# Patient Record
Sex: MALE | Race: BLACK OR AFRICAN AMERICAN | Employment: FULL TIME | URBAN - METROPOLITAN AREA
[De-identification: names, ages, dates, MRNs, and addresses within clinical notes are randomized per-mention and may not be internally consistent; named-entity substitution may affect disease eponyms.]

---

## 2022-12-01 ENCOUNTER — APPOINTMENT (OUTPATIENT)
Dept: GENERAL RADIOLOGY | Age: 62
DRG: 390 | End: 2022-12-01
Payer: COMMERCIAL

## 2022-12-01 ENCOUNTER — HOSPITAL ENCOUNTER (INPATIENT)
Age: 62
LOS: 5 days | Discharge: HOME | DRG: 390 | End: 2022-12-06
Attending: INTERNAL MEDICINE | Admitting: INTERNAL MEDICINE
Payer: COMMERCIAL

## 2022-12-01 ENCOUNTER — APPOINTMENT (OUTPATIENT)
Dept: CT IMAGING | Age: 62
DRG: 390 | End: 2022-12-01
Payer: COMMERCIAL

## 2022-12-01 DIAGNOSIS — K56.609 SBO (SMALL BOWEL OBSTRUCTION) (HCC): Primary | ICD-10-CM

## 2022-12-01 LAB
A/G RATIO: 1.3 (ref 1.1–2.2)
ALBUMIN SERPL-MCNC: 4.6 G/DL (ref 3.4–5)
ALP BLD-CCNC: 92 U/L (ref 40–129)
ALT SERPL-CCNC: 20 U/L (ref 10–40)
ANION GAP SERPL CALCULATED.3IONS-SCNC: 11 MMOL/L (ref 3–16)
AST SERPL-CCNC: 21 U/L (ref 15–37)
BACTERIA: ABNORMAL /HPF
BASOPHILS ABSOLUTE: 0 K/UL (ref 0–0.2)
BASOPHILS RELATIVE PERCENT: 0.3 %
BILIRUB SERPL-MCNC: 0.4 MG/DL (ref 0–1)
BILIRUBIN URINE: NEGATIVE
BLOOD, URINE: NEGATIVE
BUN BLDV-MCNC: 19 MG/DL (ref 7–20)
CALCIUM SERPL-MCNC: 10.1 MG/DL (ref 8.3–10.6)
CHLORIDE BLD-SCNC: 99 MMOL/L (ref 99–110)
CLARITY: CLEAR
CO2: 28 MMOL/L (ref 21–32)
COLOR: YELLOW
COMMENT UA: ABNORMAL
CREAT SERPL-MCNC: 0.9 MG/DL (ref 0.8–1.3)
EOSINOPHILS ABSOLUTE: 0.1 K/UL (ref 0–0.6)
EOSINOPHILS RELATIVE PERCENT: 0.5 %
EPITHELIAL CELLS, UA: ABNORMAL /HPF (ref 0–5)
GFR SERPL CREATININE-BSD FRML MDRD: >60 ML/MIN/{1.73_M2}
GLUCOSE BLD-MCNC: 178 MG/DL (ref 70–99)
GLUCOSE URINE: >=1000 MG/DL
HCT VFR BLD CALC: 43.5 % (ref 40.5–52.5)
HEMOGLOBIN: 13.5 G/DL (ref 13.5–17.5)
KETONES, URINE: 15 MG/DL
LACTIC ACID, SEPSIS: 1.2 MMOL/L (ref 0.4–1.9)
LEUKOCYTE ESTERASE, URINE: NEGATIVE
LIPASE: 20 U/L (ref 13–60)
LYMPHOCYTES ABSOLUTE: 1.9 K/UL (ref 1–5.1)
LYMPHOCYTES RELATIVE PERCENT: 16.5 %
MCH RBC QN AUTO: 23.9 PG (ref 26–34)
MCHC RBC AUTO-ENTMCNC: 31.1 G/DL (ref 31–36)
MCV RBC AUTO: 77 FL (ref 80–100)
MICROSCOPIC EXAMINATION: YES
MONOCYTES ABSOLUTE: 0.6 K/UL (ref 0–1.3)
MONOCYTES RELATIVE PERCENT: 4.9 %
MUCUS: ABNORMAL /LPF
NEUTROPHILS ABSOLUTE: 8.8 K/UL (ref 1.7–7.7)
NEUTROPHILS RELATIVE PERCENT: 77.8 %
NITRITE, URINE: NEGATIVE
PDW BLD-RTO: 16.7 % (ref 12.4–15.4)
PH UA: 5 (ref 5–8)
PLATELET # BLD: 211 K/UL (ref 135–450)
PMV BLD AUTO: 7.8 FL (ref 5–10.5)
POTASSIUM SERPL-SCNC: 4.1 MMOL/L (ref 3.5–5.1)
PROTEIN UA: ABNORMAL MG/DL
RBC # BLD: 5.65 M/UL (ref 4.2–5.9)
RBC UA: ABNORMAL /HPF (ref 0–4)
SODIUM BLD-SCNC: 138 MMOL/L (ref 136–145)
SPECIFIC GRAVITY UA: >=1.03 (ref 1–1.03)
TOTAL PROTEIN: 8.1 G/DL (ref 6.4–8.2)
URINE REFLEX TO CULTURE: ABNORMAL
URINE TYPE: ABNORMAL
UROBILINOGEN, URINE: 0.2 E.U./DL
WBC # BLD: 11.3 K/UL (ref 4–11)
WBC UA: ABNORMAL /HPF (ref 0–5)

## 2022-12-01 PROCEDURE — 83605 ASSAY OF LACTIC ACID: CPT

## 2022-12-01 PROCEDURE — 6360000002 HC RX W HCPCS: Performed by: NURSE PRACTITIONER

## 2022-12-01 PROCEDURE — 85025 COMPLETE CBC W/AUTO DIFF WBC: CPT

## 2022-12-01 PROCEDURE — 71045 X-RAY EXAM CHEST 1 VIEW: CPT

## 2022-12-01 PROCEDURE — 96374 THER/PROPH/DIAG INJ IV PUSH: CPT

## 2022-12-01 PROCEDURE — 96375 TX/PRO/DX INJ NEW DRUG ADDON: CPT

## 2022-12-01 PROCEDURE — 74176 CT ABD & PELVIS W/O CONTRAST: CPT

## 2022-12-01 PROCEDURE — 83690 ASSAY OF LIPASE: CPT

## 2022-12-01 PROCEDURE — 99285 EMERGENCY DEPT VISIT HI MDM: CPT

## 2022-12-01 PROCEDURE — 81001 URINALYSIS AUTO W/SCOPE: CPT

## 2022-12-01 PROCEDURE — 2580000003 HC RX 258: Performed by: NURSE PRACTITIONER

## 2022-12-01 PROCEDURE — 80053 COMPREHEN METABOLIC PANEL: CPT

## 2022-12-01 PROCEDURE — 1200000000 HC SEMI PRIVATE

## 2022-12-01 RX ORDER — ONDANSETRON 2 MG/ML
4 INJECTION INTRAMUSCULAR; INTRAVENOUS EVERY 30 MIN PRN
Status: DISCONTINUED | OUTPATIENT
Start: 2022-12-01 | End: 2022-12-02

## 2022-12-01 RX ORDER — GLIPIZIDE 10 MG/1
10 TABLET ORAL DAILY
Status: ON HOLD | COMMUNITY
Start: 2019-04-30 | End: 2022-12-06 | Stop reason: HOSPADM

## 2022-12-01 RX ORDER — SODIUM CHLORIDE 9 MG/ML
1000 INJECTION, SOLUTION INTRAVENOUS ONCE
Status: COMPLETED | OUTPATIENT
Start: 2022-12-01 | End: 2022-12-01

## 2022-12-01 RX ORDER — ONDANSETRON 4 MG/1
4 TABLET, ORALLY DISINTEGRATING ORAL EVERY 8 HOURS PRN
Status: ON HOLD | COMMUNITY
Start: 2020-11-20 | End: 2022-12-06 | Stop reason: HOSPADM

## 2022-12-01 RX ORDER — FAMOTIDINE 20 MG/1
20 TABLET, FILM COATED ORAL 2 TIMES DAILY
Status: ON HOLD | COMMUNITY
Start: 2020-01-14

## 2022-12-01 RX ORDER — EMPAGLIFLOZIN 10 MG/1
TABLET, FILM COATED ORAL
Status: ON HOLD | COMMUNITY
Start: 2022-11-22

## 2022-12-01 RX ORDER — ACETAMINOPHEN 325 MG/1
650 TABLET ORAL 3 TIMES DAILY
Status: ON HOLD | COMMUNITY
Start: 2020-01-14

## 2022-12-01 RX ORDER — ATORVASTATIN CALCIUM 40 MG/1
40 TABLET, FILM COATED ORAL DAILY
Status: ON HOLD | COMMUNITY
Start: 2019-04-30

## 2022-12-01 RX ORDER — BLOOD-GLUCOSE METER
KIT MISCELLANEOUS
Status: ON HOLD | COMMUNITY
Start: 2019-04-30

## 2022-12-01 RX ORDER — DULAGLUTIDE 1.5 MG/.5ML
INJECTION, SOLUTION SUBCUTANEOUS
Status: ON HOLD | COMMUNITY
Start: 2022-09-19

## 2022-12-01 RX ORDER — ASPIRIN 81 MG/1
81 TABLET ORAL DAILY
Status: ON HOLD | COMMUNITY
Start: 2020-01-14

## 2022-12-01 RX ORDER — MORPHINE SULFATE 4 MG/ML
4 INJECTION, SOLUTION INTRAMUSCULAR; INTRAVENOUS ONCE
Status: COMPLETED | OUTPATIENT
Start: 2022-12-01 | End: 2022-12-01

## 2022-12-01 RX ORDER — SILDENAFIL 25 MG/1
25 TABLET, FILM COATED ORAL PRN
Status: ON HOLD | COMMUNITY
Start: 2019-04-30

## 2022-12-01 RX ORDER — DULAGLUTIDE 0.75 MG/.5ML
INJECTION, SOLUTION SUBCUTANEOUS
Status: ON HOLD | COMMUNITY
Start: 2022-09-19

## 2022-12-01 RX ORDER — MELOXICAM 7.5 MG/1
7.5 TABLET ORAL DAILY
Status: ON HOLD | COMMUNITY

## 2022-12-01 RX ORDER — METOCLOPRAMIDE HYDROCHLORIDE 5 MG/ML
10 INJECTION INTRAMUSCULAR; INTRAVENOUS ONCE
Status: COMPLETED | OUTPATIENT
Start: 2022-12-01 | End: 2022-12-01

## 2022-12-01 RX ORDER — KETOROLAC TROMETHAMINE 30 MG/ML
15 INJECTION, SOLUTION INTRAMUSCULAR; INTRAVENOUS ONCE
Status: COMPLETED | OUTPATIENT
Start: 2022-12-01 | End: 2022-12-01

## 2022-12-01 RX ORDER — ATORVASTATIN CALCIUM 40 MG/1
TABLET, FILM COATED ORAL
Status: ON HOLD | COMMUNITY
Start: 2022-11-22 | End: 2022-12-06 | Stop reason: HOSPADM

## 2022-12-01 RX ORDER — DIPHENHYDRAMINE HYDROCHLORIDE 50 MG/ML
25 INJECTION INTRAMUSCULAR; INTRAVENOUS ONCE
Status: COMPLETED | OUTPATIENT
Start: 2022-12-01 | End: 2022-12-01

## 2022-12-01 RX ADMIN — ONDANSETRON 4 MG: 2 INJECTION INTRAMUSCULAR; INTRAVENOUS at 23:08

## 2022-12-01 RX ADMIN — DIPHENHYDRAMINE HYDROCHLORIDE 25 MG: 50 INJECTION, SOLUTION INTRAMUSCULAR; INTRAVENOUS at 23:22

## 2022-12-01 RX ADMIN — METOCLOPRAMIDE 10 MG: 5 INJECTION, SOLUTION INTRAMUSCULAR; INTRAVENOUS at 23:22

## 2022-12-01 RX ADMIN — KETOROLAC TROMETHAMINE 15 MG: 30 INJECTION, SOLUTION INTRAMUSCULAR; INTRAVENOUS at 23:07

## 2022-12-01 RX ADMIN — MORPHINE SULFATE 4 MG: 4 INJECTION, SOLUTION INTRAMUSCULAR; INTRAVENOUS at 23:08

## 2022-12-01 RX ADMIN — SODIUM CHLORIDE 1000 ML: 9 INJECTION, SOLUTION INTRAVENOUS at 23:07

## 2022-12-01 ASSESSMENT — ENCOUNTER SYMPTOMS
VOMITING: 1
NAUSEA: 1
ABDOMINAL PAIN: 1
CHEST TIGHTNESS: 0
SHORTNESS OF BREATH: 0
DIARRHEA: 1

## 2022-12-01 ASSESSMENT — PAIN SCALES - GENERAL: PAINLEVEL_OUTOF10: 10

## 2022-12-02 LAB
A/G RATIO: 1.3 (ref 1.1–2.2)
ALBUMIN SERPL-MCNC: 3.9 G/DL (ref 3.4–5)
ALP BLD-CCNC: 88 U/L (ref 40–129)
ALT SERPL-CCNC: 14 U/L (ref 10–40)
ANION GAP SERPL CALCULATED.3IONS-SCNC: 13 MMOL/L (ref 3–16)
APTT: 36.2 SEC (ref 23–34.3)
AST SERPL-CCNC: 18 U/L (ref 15–37)
BASOPHILS ABSOLUTE: 0 K/UL (ref 0–0.2)
BASOPHILS RELATIVE PERCENT: 0.3 %
BILIRUB SERPL-MCNC: 0.4 MG/DL (ref 0–1)
BUN BLDV-MCNC: 24 MG/DL (ref 7–20)
CALCIUM SERPL-MCNC: 9.4 MG/DL (ref 8.3–10.6)
CHLORIDE BLD-SCNC: 104 MMOL/L (ref 99–110)
CO2: 24 MMOL/L (ref 21–32)
CREAT SERPL-MCNC: 0.9 MG/DL (ref 0.8–1.3)
EOSINOPHILS ABSOLUTE: 0 K/UL (ref 0–0.6)
EOSINOPHILS RELATIVE PERCENT: 0.2 %
GFR SERPL CREATININE-BSD FRML MDRD: >60 ML/MIN/{1.73_M2}
GLUCOSE BLD-MCNC: 112 MG/DL (ref 70–99)
GLUCOSE BLD-MCNC: 115 MG/DL (ref 70–99)
GLUCOSE BLD-MCNC: 136 MG/DL (ref 70–99)
GLUCOSE BLD-MCNC: 146 MG/DL (ref 70–99)
GLUCOSE BLD-MCNC: 88 MG/DL (ref 70–99)
GLUCOSE BLD-MCNC: 91 MG/DL (ref 70–99)
GLUCOSE BLD-MCNC: 94 MG/DL (ref 70–99)
HCT VFR BLD CALC: 40.2 % (ref 40.5–52.5)
HEMOGLOBIN: 12.6 G/DL (ref 13.5–17.5)
INR BLD: 1.06 (ref 0.87–1.14)
LACTIC ACID: 0.8 MMOL/L (ref 0.4–2)
LYMPHOCYTES ABSOLUTE: 1.8 K/UL (ref 1–5.1)
LYMPHOCYTES RELATIVE PERCENT: 25.1 %
MAGNESIUM: 1.6 MG/DL (ref 1.8–2.4)
MCH RBC QN AUTO: 24.1 PG (ref 26–34)
MCHC RBC AUTO-ENTMCNC: 31.5 G/DL (ref 31–36)
MCV RBC AUTO: 76.6 FL (ref 80–100)
MONOCYTES ABSOLUTE: 0.7 K/UL (ref 0–1.3)
MONOCYTES RELATIVE PERCENT: 9.7 %
NEUTROPHILS ABSOLUTE: 4.6 K/UL (ref 1.7–7.7)
NEUTROPHILS RELATIVE PERCENT: 64.7 %
PDW BLD-RTO: 16.2 % (ref 12.4–15.4)
PERFORMED ON: ABNORMAL
PERFORMED ON: NORMAL
PHOSPHORUS: 4.8 MG/DL (ref 2.5–4.9)
PLATELET # BLD: 201 K/UL (ref 135–450)
PMV BLD AUTO: 7.8 FL (ref 5–10.5)
POTASSIUM SERPL-SCNC: 3.8 MMOL/L (ref 3.5–5.1)
PREALBUMIN: 23.1 MG/DL (ref 20–40)
PROTHROMBIN TIME: 13.7 SEC (ref 11.7–14.5)
RBC # BLD: 5.24 M/UL (ref 4.2–5.9)
SODIUM BLD-SCNC: 141 MMOL/L (ref 136–145)
TOTAL PROTEIN: 6.9 G/DL (ref 6.4–8.2)
TRANSFERRIN: 210 MG/DL (ref 200–360)
WBC # BLD: 7.1 K/UL (ref 4–11)

## 2022-12-02 PROCEDURE — 83605 ASSAY OF LACTIC ACID: CPT

## 2022-12-02 PROCEDURE — 84466 ASSAY OF TRANSFERRIN: CPT

## 2022-12-02 PROCEDURE — APPSS60 APP SPLIT SHARED TIME 46-60 MINUTES: Performed by: NURSE PRACTITIONER

## 2022-12-02 PROCEDURE — 1200000000 HC SEMI PRIVATE

## 2022-12-02 PROCEDURE — 85610 PROTHROMBIN TIME: CPT

## 2022-12-02 PROCEDURE — 6360000002 HC RX W HCPCS: Performed by: PHYSICIAN ASSISTANT

## 2022-12-02 PROCEDURE — 83735 ASSAY OF MAGNESIUM: CPT

## 2022-12-02 PROCEDURE — APPNB30 APP NON BILLABLE TIME 0-30 MINS: Performed by: NURSE PRACTITIONER

## 2022-12-02 PROCEDURE — 80053 COMPREHEN METABOLIC PANEL: CPT

## 2022-12-02 PROCEDURE — 6360000002 HC RX W HCPCS: Performed by: NURSE PRACTITIONER

## 2022-12-02 PROCEDURE — 85025 COMPLETE CBC W/AUTO DIFF WBC: CPT

## 2022-12-02 PROCEDURE — 99222 1ST HOSP IP/OBS MODERATE 55: CPT | Performed by: SURGERY

## 2022-12-02 PROCEDURE — 84100 ASSAY OF PHOSPHORUS: CPT

## 2022-12-02 PROCEDURE — 85730 THROMBOPLASTIN TIME PARTIAL: CPT

## 2022-12-02 PROCEDURE — 84134 ASSAY OF PREALBUMIN: CPT

## 2022-12-02 PROCEDURE — 2580000003 HC RX 258: Performed by: PHYSICIAN ASSISTANT

## 2022-12-02 RX ORDER — ENOXAPARIN SODIUM 100 MG/ML
40 INJECTION SUBCUTANEOUS DAILY
Status: DISCONTINUED | OUTPATIENT
Start: 2022-12-02 | End: 2022-12-06 | Stop reason: HOSPADM

## 2022-12-02 RX ORDER — SODIUM CHLORIDE 9 MG/ML
INJECTION, SOLUTION INTRAVENOUS CONTINUOUS
Status: DISCONTINUED | OUTPATIENT
Start: 2022-12-02 | End: 2022-12-06

## 2022-12-02 RX ORDER — MAGNESIUM SULFATE IN WATER 40 MG/ML
2000 INJECTION, SOLUTION INTRAVENOUS ONCE
Status: COMPLETED | OUTPATIENT
Start: 2022-12-02 | End: 2022-12-02

## 2022-12-02 RX ORDER — ACETAMINOPHEN 325 MG/1
650 TABLET ORAL EVERY 6 HOURS PRN
Status: DISCONTINUED | OUTPATIENT
Start: 2022-12-02 | End: 2022-12-06 | Stop reason: HOSPADM

## 2022-12-02 RX ORDER — MORPHINE SULFATE 4 MG/ML
4 INJECTION, SOLUTION INTRAMUSCULAR; INTRAVENOUS
Status: DISCONTINUED | OUTPATIENT
Start: 2022-12-02 | End: 2022-12-06

## 2022-12-02 RX ORDER — SODIUM CHLORIDE 9 MG/ML
INJECTION, SOLUTION INTRAVENOUS PRN
Status: DISCONTINUED | OUTPATIENT
Start: 2022-12-02 | End: 2022-12-06 | Stop reason: HOSPADM

## 2022-12-02 RX ORDER — DEXTROSE MONOHYDRATE 100 MG/ML
INJECTION, SOLUTION INTRAVENOUS CONTINUOUS PRN
Status: DISCONTINUED | OUTPATIENT
Start: 2022-12-02 | End: 2022-12-06 | Stop reason: HOSPADM

## 2022-12-02 RX ORDER — INSULIN LISPRO 100 [IU]/ML
0-4 INJECTION, SOLUTION INTRAVENOUS; SUBCUTANEOUS EVERY 4 HOURS
Status: DISCONTINUED | OUTPATIENT
Start: 2022-12-02 | End: 2022-12-06 | Stop reason: HOSPADM

## 2022-12-02 RX ORDER — ONDANSETRON 2 MG/ML
4 INJECTION INTRAMUSCULAR; INTRAVENOUS EVERY 6 HOURS PRN
Status: DISCONTINUED | OUTPATIENT
Start: 2022-12-02 | End: 2022-12-06 | Stop reason: HOSPADM

## 2022-12-02 RX ORDER — SODIUM CHLORIDE 0.9 % (FLUSH) 0.9 %
5-40 SYRINGE (ML) INJECTION EVERY 12 HOURS SCHEDULED
Status: DISCONTINUED | OUTPATIENT
Start: 2022-12-02 | End: 2022-12-06 | Stop reason: HOSPADM

## 2022-12-02 RX ORDER — SENNA PLUS 8.6 MG/1
1 TABLET ORAL DAILY PRN
Status: DISCONTINUED | OUTPATIENT
Start: 2022-12-02 | End: 2022-12-06 | Stop reason: HOSPADM

## 2022-12-02 RX ORDER — MORPHINE SULFATE 2 MG/ML
2 INJECTION, SOLUTION INTRAMUSCULAR; INTRAVENOUS
Status: DISCONTINUED | OUTPATIENT
Start: 2022-12-02 | End: 2022-12-06

## 2022-12-02 RX ORDER — ACETAMINOPHEN 650 MG/1
650 SUPPOSITORY RECTAL EVERY 6 HOURS PRN
Status: DISCONTINUED | OUTPATIENT
Start: 2022-12-02 | End: 2022-12-06 | Stop reason: HOSPADM

## 2022-12-02 RX ORDER — SODIUM CHLORIDE 0.9 % (FLUSH) 0.9 %
5-40 SYRINGE (ML) INJECTION PRN
Status: DISCONTINUED | OUTPATIENT
Start: 2022-12-02 | End: 2022-12-06 | Stop reason: HOSPADM

## 2022-12-02 RX ADMIN — MORPHINE SULFATE 4 MG: 4 INJECTION, SOLUTION INTRAMUSCULAR; INTRAVENOUS at 21:55

## 2022-12-02 RX ADMIN — MORPHINE SULFATE 4 MG: 4 INJECTION, SOLUTION INTRAMUSCULAR; INTRAVENOUS at 14:26

## 2022-12-02 RX ADMIN — MORPHINE SULFATE 4 MG: 4 INJECTION, SOLUTION INTRAMUSCULAR; INTRAVENOUS at 00:28

## 2022-12-02 RX ADMIN — SODIUM CHLORIDE: 9 INJECTION, SOLUTION INTRAVENOUS at 01:29

## 2022-12-02 RX ADMIN — SODIUM CHLORIDE: 9 INJECTION, SOLUTION INTRAVENOUS at 08:22

## 2022-12-02 RX ADMIN — MAGNESIUM SULFATE HEPTAHYDRATE 2000 MG: 40 INJECTION, SOLUTION INTRAVENOUS at 15:35

## 2022-12-02 RX ADMIN — Medication 10 ML: at 10:45

## 2022-12-02 RX ADMIN — MORPHINE SULFATE 4 MG: 4 INJECTION, SOLUTION INTRAMUSCULAR; INTRAVENOUS at 03:22

## 2022-12-02 RX ADMIN — ONDANSETRON 4 MG: 2 INJECTION INTRAMUSCULAR; INTRAVENOUS at 04:50

## 2022-12-02 RX ADMIN — SODIUM CHLORIDE: 9 INJECTION, SOLUTION INTRAVENOUS at 18:23

## 2022-12-02 RX ADMIN — MORPHINE SULFATE 4 MG: 4 INJECTION, SOLUTION INTRAMUSCULAR; INTRAVENOUS at 08:16

## 2022-12-02 RX ADMIN — ENOXAPARIN SODIUM 40 MG: 100 INJECTION SUBCUTANEOUS at 08:13

## 2022-12-02 ASSESSMENT — PAIN - FUNCTIONAL ASSESSMENT
PAIN_FUNCTIONAL_ASSESSMENT: ACTIVITIES ARE NOT PREVENTED

## 2022-12-02 ASSESSMENT — PAIN SCALES - GENERAL
PAINLEVEL_OUTOF10: 7
PAINLEVEL_OUTOF10: 6
PAINLEVEL_OUTOF10: 7
PAINLEVEL_OUTOF10: 4
PAINLEVEL_OUTOF10: 6
PAINLEVEL_OUTOF10: 1
PAINLEVEL_OUTOF10: 2
PAINLEVEL_OUTOF10: 6
PAINLEVEL_OUTOF10: 8

## 2022-12-02 ASSESSMENT — PAIN DESCRIPTION - ORIENTATION
ORIENTATION: LOWER
ORIENTATION: MID;LOWER

## 2022-12-02 ASSESSMENT — PAIN DESCRIPTION - DESCRIPTORS
DESCRIPTORS: DISCOMFORT;SHARP
DESCRIPTORS: DISCOMFORT;SHARP

## 2022-12-02 ASSESSMENT — PAIN DESCRIPTION - LOCATION
LOCATION: BACK
LOCATION: BACK;OTHER (COMMENT)
LOCATION: BACK

## 2022-12-02 NOTE — CARE COORDINATION
Discharge Planning Note:    Chart reviewed and it appears that patient has minimal needs for discharge at this time. Discussed with patient and requested that case management be notified if discharge needs are identified.     - Current discharge plan is for the patient to return home. - PCP: Ignacio Castrejon. DO    Case management will continue to follow progress and update discharge plan as needed.       Risk of Readmission Score: 8%    JOANNA Paulson RN    [facility]  Phone: [phone]

## 2022-12-02 NOTE — PROGRESS NOTES
[facility] PROGRESS NOTE    12/2/2022 3:41 PM        Name: Ajit Telles . Admitted: 12/1/2022  Primary Care Provider: Viraj Conner DO, DO (Tel: [phone])                        Subjective:  . No bowel movement not passing flatus NG tube in place. Denies any chest pain or shortness of breath    Reviewed interval ancillary notes    Current Medications  glucose-vitamin C chewable tablet 4 tablet, PRN  dextrose bolus 10% 125 mL, PRN   Or  dextrose bolus 10% 250 mL, PRN  glucagon (rDNA) injection 1 mg, PRN  dextrose 10 % infusion, Continuous PRN  insulin lispro (HUMALOG) injection vial 0-4 Units, Q4H  sodium chloride flush 0.9 % injection 5-40 mL, 2 times per day  sodium chloride flush 0.9 % injection 5-40 mL, PRN  0.9 % sodium chloride infusion, PRN  ondansetron (ZOFRAN) injection 4 mg, Q6H PRN  senna (SENOKOT) tablet 8.6 mg, Daily PRN  acetaminophen (TYLENOL) tablet 650 mg, Q6H PRN   Or  acetaminophen (TYLENOL) suppository 650 mg, Q6H PRN  0.9 % sodium chloride infusion, Continuous  enoxaparin (LOVENOX) injection 40 mg, Daily  morphine (PF) injection 2 mg, Q3H PRN   Or  morphine injection 4 mg, Q3H PRN  magnesium sulfate 2000 mg in 50 mL IVPB premix, Once  phenol 1.4 % mouth spray 1 spray, Q2H PRN        Objective:  BP (!) 144/86   Pulse 88   Temp 98.4 °F (36.9 °C) (Oral)   Resp 14   Ht 5' 11\" (1.803 m)   Wt 195 lb (88.5 kg)   SpO2 96%   BMI 27.20 kg/m²     Intake/Output Summary (Last 24 hours) at 12/2/2022 1541  Last data filed at 12/2/2022 1407  Gross per 24 hour   Intake 710.87 ml   Output 1475 ml   Net -764.13 ml      Wt Readings from Last 3 Encounters:   12/01/22 195 lb (88.5 kg)       General appearance:  Appears comfortable  Eyes: Sclera clear. Pupils equal.  ENT: Moist oral mucosa. Trachea midline, no adenopathy.   Cardiovascular: Regular rhythm, normal S1, S2. No murmur. No edema in lower extremities  Respiratory: Not using accessory muscles. Good inspiratory effort. Clear to auscultation bilaterally, no wheeze or crackles. GI: Abdomen soft, no tenderness, not distended, normal bowel sounds  Musculoskeletal: No cyanosis in digits, neck supple  Neurology: CN 2-12 grossly intact. No speech or motor deficits  Psych: Normal affect. Alert and oriented in time, place and person  Skin: Warm, dry, normal turgor    Labs and Tests:  CBC:   Recent Labs     12/01/22 2152 12/02/22  0430   WBC 11.3* 7.1   HGB 13.5 12.6*    201     BMP:    Recent Labs     12/01/22 2152 12/02/22  0430    141   K 4.1 3.8   CL 99 104   CO2 28 24   BUN 19 24*   CREATININE 0.9 0.9   GLUCOSE 178* 146*     Hepatic:   Recent Labs     12/01/22 2152 12/02/22  0430   AST 21 18   ALT 20 14   BILITOT 0.4 0.4   ALKPHOS 92 88       Discussed care with patient             Problem List  Principal Problem:    SBO (small bowel obstruction) ([facility] [address]  Resolved Problems:    * No resolved hospital problems. *       Assessment & Plan:   Small bowel obstruction  = Continue conservative management with NG tube decompression n.p.o. status  Continue IV hydration pain medication.  -Electrolytes. Check daily labs. -Monitor closely for any worsening symptoms    DVT prophylaxis on    Diabetes  -Continue to adjust insulin sliding scale for now  -Hypoglycemia protocol    History of previous melanoma. Hyperplasia stable    Leukocytosis likely reactive. Improved.   Atorvastatin (      Diet: Diet NPO  Code:Full Code  DVT PPX lovenox       Ricco Rao MD   12/2/2022 3:41 PM

## 2022-12-02 NOTE — PLAN OF CARE
[facility]        Assessment & Plan of Care    History of Present Illness: Mr. Giselle Mccullough is a 60 y.o. male who presented to the ED yesterday with severe, constant pressure-like pain in the mid-abdomen. He reports intermittent nausea and vomiting for about the last month, but had contributed that to starting a new medication (Trulicity); pain did not start until yesterday. Pain was better following vomiting episodes; no aggravating factors noted. Associated constipation; last BM was 2 days ago, reports daily bowel movements at baseline. He denies recent flatus. Denies fevers, chills, bloody stools, urinary symptoms, chest pain, or SOB. He reports similar symptoms and prior bowel obstructions in the past for which he was hospitalized (at [facility]) and recovered without surgical intervention; the first was about 5-6 years ago, the last was about 2 years ago. He does, however, report milder symptoms since then that resolved without medical intervention. Medical history includes hypertension, hyperlipidemia, diabetes, and anemia. Prior abdominal surgery includes partial colectomy for large polyp, about 10 years ago. Last colonoscopy was 4 years ago, due next year. No blood thinners. Nonsmoker.     Physical Exam:  CONSTITUTIONAL:  alert, no apparent distress and mildly obese  NEUROLOGIC:  Mental Status Exam:  Level of Alertness:   awake  Orientation:   person, place, time  EYES:  sclera clear  ENT:  normocepalic, without obvious abnormality  NECK:  supple, symmetrical, trachea midline  LUNGS:  clear to auscultation  CARDIOVASCULAR:  regular rate and rhythm and no murmur noted  ABDOMEN: soft, mildly distended, non-tender, voluntary guarding absent, no masses palpated, normal bowel sounds,  scars noted--lower midline scar, and hernia absent  Extremities: no edema  SKIN:  no bruising or bleeding and normal skin color, texture, turgor    Assessment:  Small bowel obstruction    Plan:  1. No plans for surgical intervention at this time; continued supportive care and monitoring  2. NPO with NGT decompression; monitor bowel function  3. IV hydration; monitor and correct electrolytes  4. Activity as tolerated, ambulate TID  5. PRN analgesics and antiemetics--minimizing narcotics as tolerated  6. DVT prophylaxis with  Lovenox  7. Management of medical comorbid etiologies per primary team and consulting services    EDUCATION:  Educated patient on plan of care and disease process--all questions answered. Plans discussed with patient and nursing. Reviewed and discussed with Dr. Malissa Banks consult to follow.       Signed:  Rose Owen, MILLIE - CNP  12/2/2022 9:11 AM

## 2022-12-02 NOTE — PROGRESS NOTES
8:26 AM  Shift Assessment completed, pt is alert and oriented, VSS, fall precautions are in place, call light and belongings are in reach, wife is at bedside. Patient reports pain level of 8/10, pain medication given as ordered. No insulin coverage needed. Plan of care discussed with patient and patient's wife, patient is agreeable. 12:56 PM  Patient has ambulated twice around unit with no problems and reports 1/10 pain while ambulating and post-ambulation. Patient has voided 250 mL of urine. Patient reports no nausea or vomiting, no gas and no bowel movement. No insulin coverage needed. Patient encouraged to continue ambulation and patient agreeable. 2:52 PM  Patient reports pain of 7/10 and a headache. Pain medications given as ordered in [facility]. Patient voiding, voided 250 mL.    6:02 PM  Patient ambulated in grewal 3 more laps with no complications. Magnesium replaced. No insulin coverage was needed. Call light and belongings within reach.

## 2022-12-02 NOTE — PROGRESS NOTES
Admission assessment completed, pt is alert and oriented, VSS, fall precautions in place, call light within reach, pt is reminded of NPO status, see flowsheets and MAR, will monitor pt. The care plan and education has been reviewed and mutually agreed upon with the patient.

## 2022-12-02 NOTE — H&P
Hospital Medicine History & Physical      Patient Name: Neeta Sena    : 1960    PCP: Idania Perry DO, DO    Date of Service:  Patient seen and examined on 2022    Chief Complaint:  Abdominal pain    History Of Present Illness:    Neeta Sena is a 60 y.o. male who presented to ED with severe, sharp, lower abdominal pain which began around 4 pm today. He reports he has had nausea, vomiting, diarrhea over the last few days. He has not had a BM today but has continued vomiting. He reports he has had intermittent issues with nausea and vomiting since he underwent colectomy over 10 years ago. He denies fever, dizziness, chest pain, shortness of breath, urinary symptoms. History supplemented by chart review. Patient has a history of right hemicolectomy/ileal resection in  for cecal mass, tubulovillous adenoma with high grade dysplasia. He has had SBO a couple times since hemicolectomy which were managed medically but none noted since . Last colonoscopy in 2018 notable for patent ileocolonic anastomosis and diverticulosis and was otherwise normal.      Past Medical History:    Patient has a past medical history of DM (diabetes mellitus) ([facility] [address] HLD (hyperlipidemia), and Tubulovillous adenoma of colon. Past Surgical History:    Patient has a past surgical history that includes hemicolectomy. Medications Prior to Admission:      Prior to Admission medications    Medication Sig Start Date End Date Taking?  Authorizing Provider   Blood Glucose Monitoring Suppl (ASSURE PRO BLOOD GLUCOSE METER) NUVIA Use as instructed 19  Yes Historical Provider, MD   acetaminophen (TYLENOL) 325 MG tablet Take 975 mg by mouth 3 times daily 20  Yes Historical Provider, MD   aspirin 81 MG EC tablet Take 81 mg by mouth daily 20  Yes Historical Provider, MD   atorvastatin (LIPITOR) 40 MG tablet Take 40 mg by mouth daily 19  Yes Historical Provider, MD   cyanocobalamin 1000 MCG tablet Take 1,000 mcg by mouth daily 4/30/19  Yes Historical Provider, MD   famotidine (PEPCID) 20 MG tablet Take 20 mg by mouth 2 times daily 1/14/20  Yes Historical Provider, MD   glipiZIDE (GLUCOTROL) 10 MG tablet Take 10 mg by mouth daily 4/30/19  Yes Historical Provider, MD   metFORMIN (GLUCOPHAGE) 1000 MG tablet Take 1,000 mg by mouth 2 times daily (with meals) 4/30/19  Yes Historical Provider, MD   ondansetron (ZOFRAN-ODT) 4 MG disintegrating tablet Take 4 mg by mouth every 8 hours as needed 11/20/20  Yes Historical Provider, MD   SITagliptin (JANUVIA) 50 MG tablet Take 50 mg by mouth daily 4/30/19  Yes Historical Provider, MD   sildenafil (VIAGRA) 25 MG tablet Take 25 mg by mouth as needed 4/30/19  Yes Historical Provider, MD   atorvastatin (LIPITOR) 40 MG tablet TAKE 1 TABLET BY MOUTH EVERY DAY 11/22/22   Historical Provider, MD   TRULICITY 5.87 EW/4.2YD SOPN USE 0.75 MG ONCE A WEEK FOR 30 DAYS. 9/19/22   Historical Provider, MD   TRULICITY 1.5 DX/9.7UX SC injection USE 1.5 MG ONCE A WEEK FOR 84 DAYS. 9/19/22   Historical Provider, MD   JARDIANCE 10 MG tablet TAKE 1 TABLET BY MOUTH EVERY DAY 11/22/22   Historical Provider, MD   meloxicam (MOBIC) 7.5 MG tablet Take 7.5 mg by mouth daily    Historical Provider, MD       Allergies:  Patient has no known allergies. Social History:      TOBACCO:   has no history on file for tobacco use. ETOH:   has no history on file for alcohol use. DRUGS:  has no history on file for drug use. Family History:      Reviewed in detail positive as follows:        Problem Relation Age of Onset    Diabetes Mother     Cancer Mother        REVIEW OF SYSTEMS:   Pertinent positives as noted in the HPI. All other systems reviewed and negative.     PHYSICAL EXAM PERFORMED:    /72   Pulse 80   Temp 98.1 °F (36.7 °C) (Oral)   Resp 18   Wt 195 lb (88.5 kg)   SpO2 98%     General appearance:  Awake, alert, no apparent distress  HEENT:  Normocephalic, atraumatic without obvious deformity. PERRL. EOM intact. Conjunctivae/corneas clear. Neck: Supple, with full range of motion. No JVD. Trachea midline. Respiratory:  Clear to auscultation bilaterally without rales, wheezes, or rhonchi. Normal respiratory effort. Cardiovascular:  Regular rate and rhythm without murmurs, rubs or gallops. Abdomen: +Abdominal tenderness. Mild distention. Soft, without rebound or guarding. Hypoactive bowel sounds. Extremities:  No clubbing, cyanosis, or edema bilaterally. Full range of motion without deformity. +2 palpable pulses, equal bilaterally. Capillary refill brisk,< 3 seconds   Skin: No rashes or lesions. Warm/dry. Neurologic:  Neurovascularly intact without any focal sensory/motor deficits. Cranial nerves: II-XII intact, grossly non-focal. Alert and oriented x 3. Normal speech. Psychiatric:  Thought content appropriate, normal insight. Labs:   CBC   Recent Labs     12/01/22 2152   WBC 11.3*   HGB 13.5   HCT 43.5           RENAL  Recent Labs     12/01/22 2152      K 4.1   CL 99   CO2 28   BUN 19   CREATININE 0.9       LFTS  Recent Labs     12/01/22 2152   AST 21   ALT 20   BILITOT 0.4   ALKPHOS 92       COAG  No results for input(s): INR in the last 72 hours. CARDIAC ENZYMES  No results for input(s): TROPONINI in the last 72 hours. LIPIDS  No results found for: CHOL, TRIG, HDL, LDLCALC      Radiology:     XR CHEST PORTABLE   Final Result   Gastric tube tip probably just within the stomach. Recommend readjusting the   tube tip into the distal stomach for more physiologic positioning. Nonspecific gas pattern. CT ABDOMEN PELVIS WO CONTRAST Additional Contrast? None   Preliminary Result   1. Acute small bowel obstruction with a high grade transition point in the   midline low abdomen anterior to the iliac bifurcation. 2. Status post right hemicolectomy. 3. Nonobstructing right nephrolithiasis.    4. Mildly enlarged bilateral inguinal lymph nodes, nonspecific. ASSESSMENT/PLAN:    SBO  CT notable for acute SBO with high grade transition point  NGT decompression  IVF  Pain control  Antiemetics  General surgery consulted    Leukocytosis  Likely reactive due to SBO/vomiting  Patient afebrile, no obvious s/s of infection  Will monitor    DM2  Last HbA1c was 7.9  Hold home PO meds  Accuchecks, SSI  Hypoglycemia protocol      HLD  Hold home statin while NPO    History of tubulovillous adenoma  Tubulovillous adenoma with high grade hyperplasia s/p right hemicolectomy in 2010  Last colonoscopy 2018 unremarkable      DVT prophylaxis: Lovenox  Probiotic if on abx: N/A    Diet: Diet NPO  Code Status: Full Code    Consults:  IP CONSULT TO GENERAL SURGERY    Disposition: Admit to Inpatient   ELOS: Greater than two midnights due to medical therapy     Charles Gonzalez PA-C    Thank you Vidal Ordonez DO, DO for the opportunity to be involved in this patient's care. If you have any questions or concerns please feel free to contact me at [phone].

## 2022-12-02 NOTE — CONSULTS
[facility]     Consult                                                     Patient Name: Lis Hughes  MRN: [id number]  [place]: 1960  Admission date: 12/1/2022  9:19 PM   Date of evaluation: 12/2/2022  Primary Care Physician: Esthela Chavez DO, DO  Reason for consult: Abdominal pain  History of Present Illness:    Mr. Krissy Estevez is a 60 y.o. male who presents with acute onset severe pressure pain around the umbilicus and lower abdomen beginning yesterday afternoon. Associated with nausea and vomiting that was constant and progressively worse. Although without pain, the patient has had intermittent nausea and vomiting for the last month. Nothing makes his symptoms better or worse. Last bowel movement was 2 days ago, the day prior to admission and no flatus since admission. Denies fevers chills chest pain dyspnea dysuria change in appetite weight or other complaints. Surgical history includes a partial colectomy for a large polyp approximately 10 years ago. Several years after developed small bowel obstruction and recovered with medical management and another several years ago. Symptoms are similar. Medical conditions include hypertension, hyperlipidemia, diabetes and chronic anemia.   Up-to-date on colonoscopies    Past Medical History:        Diagnosis Date    DM (diabetes mellitus) ([facility] [address]     HLD (hyperlipidemia)     Tubulovillous adenoma of colon        Past Surgical History:        Procedure Laterality Date    HEMICOLECTOMY         Scheduled Meds:   insulin lispro  0-4 Units SubCUTAneous Q4H    sodium chloride flush  5-40 mL IntraVENous 2 times per day    enoxaparin  40 mg SubCUTAneous Daily    magnesium sulfate  2,000 mg IntraVENous Once     Continuous Infusions:   dextrose      sodium chloride      sodium chloride 100 mL/hr at 12/02/22 0822     PRN Meds:.glucose, dextrose bolus **OR** dextrose bolus, glucagon (rDNA), dextrose, sodium chloride flush, sodium chloride, ondansetron, senna, acetaminophen **OR** acetaminophen, morphine **OR** morphine, phenol    Prior to Admission medications    Medication Sig Start Date End Date Taking? Authorizing Provider   Blood Glucose Monitoring Suppl (ASSURE PRO BLOOD GLUCOSE METER) NUVIA Use as instructed 4/30/19  Yes Historical Provider, MD   acetaminophen (TYLENOL) 325 MG tablet Take 975 mg by mouth 3 times daily 1/14/20  Yes Historical Provider, MD   aspirin 81 MG EC tablet Take 81 mg by mouth daily 1/14/20  Yes Historical Provider, MD   atorvastatin (LIPITOR) 40 MG tablet Take 40 mg by mouth daily 4/30/19  Yes Historical Provider, MD   cyanocobalamin 1000 MCG tablet Take 1,000 mcg by mouth daily 4/30/19  Yes Historical Provider, MD   famotidine (PEPCID) 20 MG tablet Take 20 mg by mouth 2 times daily 1/14/20  Yes Historical Provider, MD   glipiZIDE (GLUCOTROL) 10 MG tablet Take 10 mg by mouth daily  Patient not taking: Reported on 12/2/2022 4/30/19  Yes Historical Provider, MD   metFORMIN (GLUCOPHAGE) 1000 MG tablet Take 1,000 mg by mouth 2 times daily (with meals) 4/30/19  Yes Historical Provider, MD   ondansetron (ZOFRAN-ODT) 4 MG disintegrating tablet Take 4 mg by mouth every 8 hours as needed  Patient not taking: Reported on 12/2/2022 11/20/20  Yes Historical Provider, MD   SITagliptin (JANUVIA) 50 MG tablet Take 50 mg by mouth daily 4/30/19  Yes Historical Provider, MD   sildenafil (VIAGRA) 25 MG tablet Take 25 mg by mouth as needed 4/30/19  Yes Historical Provider, MD   atorvastatin (LIPITOR) 40 MG tablet TAKE 1 TABLET BY MOUTH EVERY DAY 11/22/22   Historical Provider, MD   TRULICITY 3.90 QO/8.0RU SOPN USE 0.75 MG ONCE A WEEK FOR 30 DAYS. 9/19/22   Historical Provider, MD   TRULICITY 1.5 YJ/1.9CF SC injection USE 1.5 MG ONCE A WEEK FOR 84 DAYS.  9/19/22   Historical Provider, MD   JARDIANCE 10 MG tablet TAKE 1 TABLET BY MOUTH EVERY DAY 11/22/22   Historical Provider, MD   meloxicam (MOBIC) 7.5 MG tablet Take 7.5 mg by mouth daily    Historical Provider, MD        Allergies:  Patient has no known allergies.     Social History:   Social History     Socioeconomic History    Marital status: Single     Spouse name: None    Number of children: None    Years of education: None    Highest education level: None   Tobacco Use    Smoking status: Never    Smokeless tobacco: Never   Substance and Sexual Activity    Alcohol use: Yes     Comment: occasional beer    Drug use: Never       Family History:    Family History   Problem Relation Age of Onset    Diabetes Mother     Cancer Mother        Review of Systems:  CONSTITUTIONAL:  Negative except as above  HEENT:  negative  RESPIRATORY:  negative  CARDIOVASCULAR:  negative  GASTROINTESTINAL:  negative except as above   GENITOURINARY:  negative  HEMATOLOGIC/LYMPHATIC:  negative  NEUROLOGICAL:  Negative      Vital Signs:  Patient Vitals for the past 24 hrs:   BP Temp Temp src Pulse Resp SpO2 Height Weight   22 1426 -- -- -- -- 14 -- -- --   22 1159 (!) 144/86 98.4 °F (36.9 °C) Oral 88 18 96 % -- --   22 0846 -- -- -- -- 14 -- -- --   22 0805 -- -- -- 92 -- -- -- --   22 0800 139/83 98.2 °F (36.8 °C) Oral 92 14 97 % -- --   22 0455 137/82 98.1 °F (36.7 °C) Oral 85 14 97 % -- --   22 0240 -- -- -- 93 14 95 % -- --   22 0102 -- -- -- -- -- -- 5' 11\" (1.803 m) --   22 0045 136/79 98 °F (36.7 °C) Oral 87 18 96 % -- --   22 2345 130/84 -- -- 93 21 97 % -- --   22 2330 124/80 -- -- 91 19 96 % -- --   22 2315 (!) 149/91 -- -- -- 20 94 % -- --   22 2306 134/85 -- -- -- -- 98 % -- --   22 2115 111/72 98.1 °F (36.7 °C) Oral 80 18 98 % -- 195 lb (88.5 kg)      TEMPERATURE HISTORY 24H: Temp (24hrs), Av.2 °F (36.8 °C), Min:98 °F (36.7 °C), Max:98.4 °F (36.9 °C)    BLOOD PRESSURE HISTORY: Systolic (94SAF), BAU:156 , Min:111 , CCY:563    Diastolic (87KFL), KPZ:55, Min:72, Max:91    Admission Weight: 195 lb (88.5 kg) Intake/Output Summary (Last 24 hours) at 12/2/2022 1643  Last data filed at 12/2/2022 1407  Gross per 24 hour   Intake 710.87 ml   Output 1475 ml   Net -764.13 ml     Drain/tube Output:         Physical Exam:  Constitutional:  well-developed, well-nourished,   Neurologic: awake, Orientation:  Oriented to person, place, time, follows commands, clear speech, cranial nerves grossly intact  Psychiatric: normal affect, no hallucinations  Eyes:  sclera clear, no visible discharge  Head, ears, nose, mouth, throat: normocephalic, without obvious abnormality, supple, symmetrical, trachea midline, no JVD, mucous membranes moist  Cardiovascular: regular rate and rhythm   Respiratory: clear to auscultation  GI: soft, non-distended, mild mid abdominal tenderness without peritonitis  Lymph: no palpable lymphadenopathy  Skin: no bruising or bleeding, normal skin color, texture, turgor, and no redness, warmth, or swelling    Labs:    CBC:    Recent Labs     12/01/22 2152 12/02/22 0430   WBC 11.3* 7.1   HGB 13.5 12.6*   HCT 43.5 40.2*    201     BMP:    Recent Labs     12/01/22 2152 12/02/22 0430    141   K 4.1 3.8   CL 99 104   CO2 28 24   BUN 19 24*   CREATININE 0.9 0.9   GLUCOSE 178* 146*     Hepatic:   Recent Labs     12/01/22 2152 12/02/22 0430   AST 21 18   ALT 20 14   BILITOT 0.4 0.4   ALKPHOS 92 88     Amylase: No results for input(s): AMYLASE in the last 72 hours.   Lipase:   Recent Labs     12/01/22 2152   LIPASE 20.0     Mag:      Recent Labs     12/02/22 0430   MG 1.60*      Phos:     Recent Labs     12/02/22 0430   PHOS 4.8      Coags:   Recent Labs     12/02/22 0430   INR 1.06   APTT 36.2*       Imaging:  I have personally reviewed the following films:  CT ABDOMEN PELVIS WO CONTRAST Additional Contrast? None    Result Date: 12/1/2022  EXAMINATION: CT OF THE ABDOMEN AND PELVIS WITHOUT CONTRAST 12/1/2022 9:37 pm TECHNIQUE: CT of the abdomen and pelvis was performed without the administration of intravenous contrast. Multiplanar reformatted images are provided for review. Automated exposure control, iterative reconstruction, and/or weight based adjustment of the mA/kV was utilized to reduce the radiation dose to as low as reasonably achievable. COMPARISON: None. HISTORY: ORDERING SYSTEM PROVIDED HISTORY: abd pain TECHNOLOGIST PROVIDED HISTORY: Reason for exam:->abd pain Additional Contrast?->None Decision Support Exception - unselect if not a suspected or confirmed emergency medical condition->Emergency Medical Condition (MA) Reason for Exam: Abd pain. Abdominal Pain (Pt reports sharp abdominal pain onset 4pm today. Reports n/v/d for a few days. Hx DM, wife states pt started trulicity approx 1 month ago and unsure if this is a side effect. States his blood glucose was 130 at home today. ). FINDINGS: Lower Chest:  Visualized portion of the lower chest demonstrates no acute abnormality. There are coronary artery calcifications. Organs: The solid organs are incompletely evaluated without intravenous contrast.  The unenhanced liver, spleen, pancreas, kidneys and adrenal glands are without acute findings. Nonobstructing right nephrolithiasis. A few simple appearing renal cysts are noted. The gallbladder is present without radiopaque cholelithiasis. GI/Bowel: Limited evaluation of the bowel without intravenous contrast. There are multiple dilated loops of small bowel consistent with an acute small bowel obstruction. The bowel is somewhat difficult to follow. There appears to be a high-grade transition point in the midline low abdomen anterior to the iliac bifurcation. The distal small bowel is decompressed. Normal caliber of the colon. Diverticulosis without evidence of acute diverticulitis. Status post right hemicolectomy with an unremarkable appearance of the ileocolic anastomosis. Pelvis: The urinary bladder is partially distended without contour abnormality.   The prostate and seminal vesicles are not well seen secondary to metallic streak artifact from a left total hip arthroplasty. Mildly enlarged bilateral inguinal lymph nodes which measure up to 1.4 cm in short axis. No definite intrapelvic adenopathy within the limitations of streak artifact. Peritoneum/Retroperitoneum: No ascites or pneumoperitoneum. Mild calcified atherosclerotic plaque. No evidence of lymphadenopathy. The aorta and IVC are normal in caliber. Bones/Soft Tissues: No acute bony or soft tissue abnormality. 1. Acute small bowel obstruction with a high grade transition point in the midline low abdomen anterior to the iliac bifurcation. 2. Status post right hemicolectomy. 3. Nonobstructing right nephrolithiasis. 4. Mildly enlarged bilateral inguinal lymph nodes, nonspecific. XR CHEST PORTABLE    Result Date: 12/2/2022  EXAMINATION: ONE XRAY VIEW OF THE CHEST 12/2/2022 12:02 am COMPARISON: None. HISTORY: ORDERING SYSTEM PROVIDED HISTORY: NG placement TECHNOLOGIST PROVIDED HISTORY: Reason for exam:->NG placement Reason for Exam:  NG placement FINDINGS: There is a nasogastric tube in place with the tip just within the stomach. The gas pattern is unremarkable. Gastric tube tip probably just within the stomach. Recommend readjusting the tube tip into the distal stomach for more physiologic positioning. Nonspecific gas pattern. Cultures:  Relevant cultures documented under results section     Assessment:  Patient Active Problem List   Diagnosis    SBO (small bowel obstruction) (HCC)     Small bowel obstruction  Diabetes    Plan:  1. The patient has a small bowel obstruction that is likely from adhesive disease. Most will respond to maximal conservative measures within 2-3 days. If he does not respond to conservative management or clinically deteriorates, may need surgical exploration  2.  If not significantly better by 1-2 days, will repeat CT with PO contrast or order small bowel follow through which may be both diagnostic and therapeutic  3. NG decompression, bowel rest  4. Monitor bowel function, await return of flatus and stool  5. IVF resuscitation, monitor and replace electrolytes as needed  6. Monitor leukocytosis, does not need antibiotics from surgical standpoint  7. Pain medication and antiemetics as needed with caution as they may mask a worsening exam  8. Defer management of remainder of medical comorbidities to primary and consulting teams    This plan was discussed at length with the patient. He was understanding and in agreement with the plan  Thank you for the consult and allowing me to participate in the care of this patient. I look forward to following him this admission    Rey Tabor MD, FACS  12/2/2022  4:43 PM

## 2022-12-02 NOTE — ED PROVIDER NOTES
[facility]        Pt Name: Aaron Cantrell  MRN: [id number]  [place] 1960  Date of evaluation: 12/1/2022  Provider: MILLIE Alvarez - CRALOZ  PCP: Rosa Cazares DO,   Note Started: 10:30 PM EST 12/1/22          SAMANTHA. I have evaluated this patient. My supervising physician was available for consultation. CHIEF COMPLAINT       Chief Complaint   Patient presents with    Abdominal Pain     Pt reports sharp abdominal pain onset 4pm today. Reports n/v/d for a few days. Hx DM, wife states pt started trulicity approx 1 month ago and unsure if this is a side effect. States his blood glucose was 130 at home today. HISTORY OF PRESENT ILLNESS   (Location, Timing/Onset, Context/Setting, Quality, Duration, Modifying Factors, Severity, Associated Signs and Symptoms)  Note limiting factors. Chief Complaint: abdominal pain     Aaron Cantrell is a 60 y.o. male who presents with severe lower abdominal pain he describes as sharp and began at about 4 PM today. History of nausea with vomiting and diarrhea over the past few days. History of type 2 diabetes, started Trulicity about a month ago. No known sick contacts. No fevers. Nursing Notes were all reviewed and agreed with or any disagreements were addressed in the HPI. REVIEW OF SYSTEMS    (2-9 systems for level 4, 10 or more for level 5)     Review of Systems   Constitutional:  Negative for activity change, chills and fever. Respiratory:  Negative for chest tightness and shortness of breath. Cardiovascular:  Negative for chest pain. Gastrointestinal:  Positive for abdominal pain, diarrhea, nausea and vomiting. Genitourinary:  Negative for dysuria. All other systems reviewed and are negative. Positives and Pertinent negatives as per HPI. Except as noted above in the ROS, all other systems were reviewed and negative.        PAST MEDICAL HISTORY   No past medical history on file. SURGICAL HISTORY   No past surgical history on file. CURRENTMEDICATIONS       Previous Medications    ACETAMINOPHEN (TYLENOL) 325 MG TABLET    Take 975 mg by mouth 3 times daily    ASPIRIN 81 MG EC TABLET    Take 81 mg by mouth daily    ATORVASTATIN (LIPITOR) 40 MG TABLET    Take 40 mg by mouth daily    ATORVASTATIN (LIPITOR) 40 MG TABLET    TAKE 1 TABLET BY MOUTH EVERY DAY    BLOOD GLUCOSE MONITORING SUPPL (ASSURE PRO BLOOD GLUCOSE METER) NUVIA    Use as instructed    CYANOCOBALAMIN 1000 MCG TABLET    Take 1,000 mcg by mouth daily    FAMOTIDINE (PEPCID) 20 MG TABLET    Take 20 mg by mouth 2 times daily    GLIPIZIDE (GLUCOTROL) 10 MG TABLET    Take 10 mg by mouth daily    JARDIANCE 10 MG TABLET    TAKE 1 TABLET BY MOUTH EVERY DAY    MELOXICAM (MOBIC) 7.5 MG TABLET    Take 7.5 mg by mouth daily    METFORMIN (GLUCOPHAGE) 1000 MG TABLET    Take 1,000 mg by mouth 2 times daily (with meals)    ONDANSETRON (ZOFRAN-ODT) 4 MG DISINTEGRATING TABLET    Take 4 mg by mouth every 8 hours as needed    SILDENAFIL (VIAGRA) 25 MG TABLET    Take 25 mg by mouth as needed    SITAGLIPTIN (JANUVIA) 50 MG TABLET    Take 50 mg by mouth daily    TRULICITY 8.79 YN/7.9DH SOPN    USE 0.75 MG ONCE A WEEK FOR 30 DAYS. TRULICITY 1.5 CM/9.9UL SC INJECTION    USE 1.5 MG ONCE A WEEK FOR 84 DAYS. ALLERGIES     Patient has no known allergies. FAMILYHISTORY     No family history on file. SOCIAL HISTORY          SCREENINGS    [place] Coma Scale  Eye Opening: Spontaneous  Best Verbal Response: Oriented  Best Motor Response: Obeys commands  [place] Coma Scale Score: 15        PHYSICAL EXAM    (up to 7 for level 4, 8 or more for level 5)     ED Triage Vitals [12/01/22 2115]   BP Temp Temp Source Heart Rate Resp SpO2 Height Weight   111/72 98.1 °F (36.7 °C) Oral 80 18 98 % -- 195 lb (88.5 kg)       Physical Exam  Vitals and nursing note reviewed. Constitutional:       Appearance: He is well-developed. He is not diaphoretic. HENT:      Head: Normocephalic and atraumatic. Right Ear: External ear normal.      Left Ear: External ear normal.   Eyes:      General:         Right eye: No discharge. Left eye: No discharge. Neck:      Vascular: No JVD. Cardiovascular:      Rate and Rhythm: Normal rate and regular rhythm. Pulses: Normal pulses. Heart sounds: Normal heart sounds. Pulmonary:      Effort: Pulmonary effort is normal. No respiratory distress. Breath sounds: Normal breath sounds. Abdominal:      Palpations: Abdomen is soft. Tenderness: There is abdominal tenderness. There is guarding. Musculoskeletal:         General: Normal range of motion. Skin:     General: Skin is warm and dry. Coloration: Skin is not pale. Neurological:      Mental Status: He is alert. Psychiatric:         Behavior: Behavior normal.       DIAGNOSTIC RESULTS   LABS:    Labs Reviewed   CBC WITH AUTO DIFFERENTIAL - Abnormal; Notable for the following components:       Result Value    WBC 11.3 (*)     MCV 77.0 (*)     MCH 23.9 (*)     RDW 16.7 (*)     Neutrophils Absolute 8.8 (*)     All other components within normal limits   COMPREHENSIVE METABOLIC PANEL - Abnormal; Notable for the following components:    Glucose 178 (*)     All other components within normal limits   URINALYSIS WITH REFLEX TO CULTURE - Abnormal; Notable for the following components:    Glucose, Ur >=1000 (*)     Ketones, Urine 15 (*)     Protein, UA TRACE (*)     All other components within normal limits   MICROSCOPIC URINALYSIS - Abnormal; Notable for the following components:    Mucus, UA Rare (*)     Bacteria, UA Rare (*)     All other components within normal limits   LACTATE, SEPSIS   LIPASE       When ordered only abnormal lab results are displayed. All other labs were within normal range or not returned as of this dictation. EKG:  When ordered, EKG's are interpreted by the Emergency Department Physician in the Alert and oriented, no focal deficits, no motor or sensory deficits. absence of a cardiologist.  Please see their note for interpretation of EKG. RADIOLOGY:   Non-plain film images such as CT, Ultrasound and MRI are read by the radiologist. Plain radiographic images are visualized and preliminarily interpreted by the ED Provider with the below findings:        Interpretation per the Radiologist below, if available at the time of this note:    CT ABDOMEN PELVIS WO CONTRAST Additional Contrast? None   Preliminary Result   1. Acute small bowel obstruction with a high grade transition point in the   midline low abdomen anterior to the iliac bifurcation. 2. Status post right hemicolectomy. 3. Nonobstructing right nephrolithiasis. 4. Mildly enlarged bilateral inguinal lymph nodes, nonspecific. No results found. PROCEDURES   Unless otherwise noted below, none     Procedures    CRITICAL CARE TIME   There was a high probability of life-threatening clinical deterioration in the patient's condition requiring my urgent intervention. I personally saw the patient and independently provided 43 minutes of non-concurrent critical care out of the total shared critical care time provided, excluding separately reportable procedures.          CONSULTS:  IP CONSULT TO GENERAL SURGERY      EMERGENCY DEPARTMENT COURSE and DIFFERENTIAL DIAGNOSIS/MDM:   Vitals:    Vitals:    12/01/22 2115   BP: 111/72   Pulse: 80   Resp: 18   Temp: 98.1 °F (36.7 °C)   TempSrc: Oral   SpO2: 98%   Weight: 195 lb (88.5 kg)       Patient was given the following medications:  Medications   ondansetron (ZOFRAN) injection 4 mg (4 mg IntraVENous Given 12/1/22 2308)   phenol 1.4 % mouth spray 1 spray (has no administration in time range)   morphine injection 4 mg (4 mg IntraVENous Given 12/1/22 2308)   ketorolac (TORADOL) injection 15 mg (15 mg IntraVENous Given 12/1/22 2307)   0.9 % sodium chloride infusion (1,000 mLs IntraVENous New Bag 12/1/22 2307)         Is this patient to be included in the SEP-1 Core Measure due to severe sepsis or septic shock? No   Exclusion criteria - the patient is NOT to be included for SEP-1 Core Measure due to: Infection is not suspected    Briefly, this is a 77-year-old type II diabetic patient presents with severe lower abdominal pain that began this afternoon and several days of nausea vomiting and diarrhea. Patient reports that he has not moved his bowels today at all but does continue to vomit. Patient is severe at rest.  Stabbing/pressure like and severe discomfort. IV fluids, morphine, Toradol, and Zofran ordered. Labs do show slight elevation in white blood cell count and glucosuria, otherwise reassuring. Normal lactate. Patient does have history of hemicolectomy following polyp removal.  He is not clear as to why. CT ABDOMEN PELVIS WO CONTRAST Additional Contrast? None (Preliminary result)  Result time 12/01/22 22:59:10  Preliminary result by Haylee Veras MD (12/01/22 22:59:10)                Impression:    1. Acute small bowel obstruction with a high grade transition point in the   midline low abdomen anterior to the iliac bifurcation. 2. Status post right hemicolectomy. 3. Nonobstructing right nephrolithiasis. 4. Mildly enlarged bilateral inguinal lymph nodes, nonspecific. General surgery consulted- spoke with dr. Dyana Rinne, he will post further orders and see the patient in the morning. Will admit to the hospitalist.    NG tube ordered. Patient and family updated regarding plan. FINAL IMPRESSION      1. SBO (small bowel obstruction) ([facility] [address]          DISPOSITION/PLAN   DISPOSITION Decision To Admit 12/01/2022 11:05:32 PM      PATIENT REFERRED TO:  No follow-up provider specified.     DISCHARGE MEDICATIONS:  New Prescriptions    No medications on file       DISCONTINUED MEDICATIONS:  Discontinued Medications    No medications on file              (Please note that portions of this note were completed with a voice recognition program. Efforts were made to edit the dictations but occasionally words are mis-transcribed.)    MILLIE Cannon CNP (electronically signed)            MILLIE Cannon CNP  12/01/22 1782

## 2022-12-03 ENCOUNTER — APPOINTMENT (OUTPATIENT)
Dept: GENERAL RADIOLOGY | Age: 62
DRG: 390 | End: 2022-12-03
Payer: COMMERCIAL

## 2022-12-03 LAB
ANION GAP SERPL CALCULATED.3IONS-SCNC: 11 MMOL/L (ref 3–16)
BASOPHILS ABSOLUTE: 0 K/UL (ref 0–0.2)
BASOPHILS RELATIVE PERCENT: 0.2 %
BUN BLDV-MCNC: 21 MG/DL (ref 7–20)
CALCIUM SERPL-MCNC: 8.5 MG/DL (ref 8.3–10.6)
CHLORIDE BLD-SCNC: 107 MMOL/L (ref 99–110)
CO2: 25 MMOL/L (ref 21–32)
CREAT SERPL-MCNC: 0.8 MG/DL (ref 0.8–1.3)
EOSINOPHILS ABSOLUTE: 0.2 K/UL (ref 0–0.6)
EOSINOPHILS RELATIVE PERCENT: 3.4 %
GFR SERPL CREATININE-BSD FRML MDRD: >60 ML/MIN/{1.73_M2}
GLUCOSE BLD-MCNC: 103 MG/DL (ref 70–99)
GLUCOSE BLD-MCNC: 131 MG/DL (ref 70–99)
GLUCOSE BLD-MCNC: 80 MG/DL (ref 70–99)
GLUCOSE BLD-MCNC: 84 MG/DL (ref 70–99)
GLUCOSE BLD-MCNC: 89 MG/DL (ref 70–99)
GLUCOSE BLD-MCNC: 90 MG/DL (ref 70–99)
HCT VFR BLD CALC: 36.8 % (ref 40.5–52.5)
HEMOGLOBIN: 11.5 G/DL (ref 13.5–17.5)
LYMPHOCYTES ABSOLUTE: 2.9 K/UL (ref 1–5.1)
LYMPHOCYTES RELATIVE PERCENT: 49.3 %
MAGNESIUM: 2 MG/DL (ref 1.8–2.4)
MCH RBC QN AUTO: 24 PG (ref 26–34)
MCHC RBC AUTO-ENTMCNC: 31.1 G/DL (ref 31–36)
MCV RBC AUTO: 77.2 FL (ref 80–100)
MONOCYTES ABSOLUTE: 0.7 K/UL (ref 0–1.3)
MONOCYTES RELATIVE PERCENT: 11.9 %
NEUTROPHILS ABSOLUTE: 2.1 K/UL (ref 1.7–7.7)
NEUTROPHILS RELATIVE PERCENT: 35.2 %
PDW BLD-RTO: 16.8 % (ref 12.4–15.4)
PERFORMED ON: ABNORMAL
PERFORMED ON: NORMAL
PHOSPHORUS: 2.2 MG/DL (ref 2.5–4.9)
PLATELET # BLD: 205 K/UL (ref 135–450)
PMV BLD AUTO: 8.1 FL (ref 5–10.5)
POTASSIUM SERPL-SCNC: 3.6 MMOL/L (ref 3.5–5.1)
RBC # BLD: 4.77 M/UL (ref 4.2–5.9)
SODIUM BLD-SCNC: 143 MMOL/L (ref 136–145)
WBC # BLD: 5.9 K/UL (ref 4–11)

## 2022-12-03 PROCEDURE — 74019 RADEX ABDOMEN 2 VIEWS: CPT

## 2022-12-03 PROCEDURE — 1200000000 HC SEMI PRIVATE

## 2022-12-03 PROCEDURE — 6360000002 HC RX W HCPCS: Performed by: PHYSICIAN ASSISTANT

## 2022-12-03 PROCEDURE — 80048 BASIC METABOLIC PNL TOTAL CA: CPT

## 2022-12-03 PROCEDURE — 36415 COLL VENOUS BLD VENIPUNCTURE: CPT

## 2022-12-03 PROCEDURE — 2580000003 HC RX 258: Performed by: PHYSICIAN ASSISTANT

## 2022-12-03 PROCEDURE — 84100 ASSAY OF PHOSPHORUS: CPT

## 2022-12-03 PROCEDURE — 85025 COMPLETE CBC W/AUTO DIFF WBC: CPT

## 2022-12-03 PROCEDURE — 83735 ASSAY OF MAGNESIUM: CPT

## 2022-12-03 PROCEDURE — 99232 SBSQ HOSP IP/OBS MODERATE 35: CPT | Performed by: SURGERY

## 2022-12-03 RX ADMIN — MORPHINE SULFATE 2 MG: 2 INJECTION, SOLUTION INTRAMUSCULAR; INTRAVENOUS at 23:37

## 2022-12-03 RX ADMIN — ONDANSETRON 4 MG: 2 INJECTION INTRAMUSCULAR; INTRAVENOUS at 15:49

## 2022-12-03 RX ADMIN — SODIUM CHLORIDE: 9 INJECTION, SOLUTION INTRAVENOUS at 05:06

## 2022-12-03 RX ADMIN — MORPHINE SULFATE 4 MG: 4 INJECTION, SOLUTION INTRAMUSCULAR; INTRAVENOUS at 13:11

## 2022-12-03 RX ADMIN — ENOXAPARIN SODIUM 40 MG: 100 INJECTION SUBCUTANEOUS at 09:32

## 2022-12-03 RX ADMIN — ONDANSETRON 4 MG: 2 INJECTION INTRAMUSCULAR; INTRAVENOUS at 08:53

## 2022-12-03 RX ADMIN — MORPHINE SULFATE 4 MG: 4 INJECTION, SOLUTION INTRAMUSCULAR; INTRAVENOUS at 06:28

## 2022-12-03 RX ADMIN — ONDANSETRON 4 MG: 2 INJECTION INTRAMUSCULAR; INTRAVENOUS at 02:15

## 2022-12-03 RX ADMIN — MORPHINE SULFATE 4 MG: 4 INJECTION, SOLUTION INTRAMUSCULAR; INTRAVENOUS at 15:49

## 2022-12-03 ASSESSMENT — PAIN DESCRIPTION - LOCATION
LOCATION: HEAD
LOCATION: ABDOMEN
LOCATION: ABDOMEN
LOCATION: HEAD

## 2022-12-03 ASSESSMENT — PAIN DESCRIPTION - ORIENTATION: ORIENTATION: MID

## 2022-12-03 ASSESSMENT — PAIN SCALES - GENERAL
PAINLEVEL_OUTOF10: 7
PAINLEVEL_OUTOF10: 5
PAINLEVEL_OUTOF10: 5

## 2022-12-03 ASSESSMENT — PAIN DESCRIPTION - DESCRIPTORS
DESCRIPTORS: ACHING
DESCRIPTORS: ACHING

## 2022-12-03 NOTE — PROGRESS NOTES
PM assessment complete, vitals stable, medications given per MAR. NG tube flushed and to wall suction, family at bedside.

## 2022-12-03 NOTE — PROGRESS NOTES
Pt resting awake in bed. Denies flatus yet. Has been walking in halls overnight. States he will do several laps now. NG in at 55 cm. Draining brown drainage. Denies significant pain at this time. Provided popsicle per pt request. Pt is axox4 and able to answer questions and follow commands throughout assessment.

## 2022-12-03 NOTE — PROGRESS NOTES
Jennyfer Farmer is a 60 y.o. male patient.     Chief complaint-small bowel obstruction    HPI-60year-old male seen in follow-up for a small bowel obstruction  Current Facility-Administered Medications   Medication Dose Route Frequency Provider Last Rate Last Admin    glucose-vitamin C chewable tablet 4 tablet  4 tablet Oral PRN Darya Cantrell PA-C        dextrose bolus 10% 125 mL  125 mL IntraVENous PRN Darya Cantrell PA-C        Or    dextrose bolus 10% 250 mL  250 mL IntraVENous PRN Darya Cantrell PA-C        glucagon (rDNA) injection 1 mg  1 mg SubCUTAneous PRN Darya Cantrell PA-C        dextrose 10 % infusion   IntraVENous Continuous PRN Darya Cantrell PA-C        insulin lispro (HUMALOG) injection vial 0-4 Units  0-4 Units SubCUTAneous Q4H Melissa Landeros PA-C        sodium chloride flush 0.9 % injection 5-40 mL  5-40 mL IntraVENous 2 times per day Darya Cantrell PA-C   10 mL at 12/02/22 1045    sodium chloride flush 0.9 % injection 5-40 mL  5-40 mL IntraVENous PRN Darya Cantrell PA-C        0.9 % sodium chloride infusion   IntraVENous PRN Darya Cantrell PA-C        ondansetron [facility]) injection 4 mg  4 mg IntraVENous Q6H PRN Darya Cantrell PA-C   4 mg at 12/03/22 7242    senna (SENOKOT) tablet 8.6 mg  1 tablet Oral Daily PRN Darya Cantrell PA-C        acetaminophen (TYLENOL) tablet 650 mg  650 mg Oral Q6H PRN Darya Cantrell PA-C        Or    acetaminophen (TYLENOL) suppository 650 mg  650 mg Rectal Q6H PRN Darya Cantrell PA-C        0.9 % sodium chloride infusion   IntraVENous Continuous Darya Cantrell PA-C 100 mL/hr at 12/03/22 0506 New Bag at 12/03/22 0506    enoxaparin (LOVENOX) injection 40 mg  40 mg SubCUTAneous Daily Darya Cantrell PA-C   40 mg at 12/03/22 0932    morphine (PF) injection 2 mg  2 mg IntraVENous Q3H PRN Darya Cantrell PA-C        Or    morphine injection 4 mg  4 mg IntraVENous Q3H PRN Darya Cantrell PA-C   4 mg at 12/03/22 1311    phenol 1.4 % mouth spray 1 spray  1 spray Mouth/Throat Q2H PRN Joselito Hinojosa PA-C         No Known Allergies  Principal Problem:    SBO (small bowel obstruction) (HCC)  Resolved Problems:    * No resolved hospital problems. *    Blood pressure 138/76, pulse 86, temperature 98.1 °F (36.7 °C), temperature source Oral, resp. rate 16, height 5' 11\" (1.803 m), weight 195 lb (88.5 kg), SpO2 96 %. Subjective:  Symptoms:  Stable. Diet:  NPO. Activity level: Normal.    Pain:  He reports no pain. Objective:  General Appearance:  Comfortable. Vital signs: (most recent): Blood pressure 138/76, pulse 86, temperature 98.1 °F (36.7 °C), temperature source Oral, resp. rate 16, height 5' 11\" (1.803 m), weight 195 lb (88.5 kg), SpO2 96 %. Output: Producing urine and no stool output. Lungs:  Normal effort and normal respiratory rate. Abdomen: Abdomen is soft and distended. There is no abdominal tenderness. Neurological: Patient is alert and oriented to person, place and time. Skin:  Warm and dry. Assessment & Plan 77-year-old male with a distant history of an open right hemicolectomy for colonic polyps who is seen in follow-up for a small bowel obstruction. He continues to have a large amount of NG tube output and is not having bowel function. No signs at this point that the small bowel obstruction is resolving. Will check abdominal x-rays. If no improvement in next 24 to 48 hours, patient will require abdominal exploration.     Maria Del Carmen Matias MD  12/3/2022

## 2022-12-03 NOTE — PROGRESS NOTES
[facility]    12/3/2022 9:57 AM        Name: Grisel Santos . Admitted: 12/1/2022  Primary Care Provider: Karina Vasquez DO, DO (Tel: [phone])                        Subjective:  . No bowel movement not passing flatus NG tube in place. Denies any chest pain or shortness of breath    Reviewed interval ancillary notes    Current Medications  glucose-vitamin C chewable tablet 4 tablet, PRN  dextrose bolus 10% 125 mL, PRN   Or  dextrose bolus 10% 250 mL, PRN  glucagon (rDNA) injection 1 mg, PRN  dextrose 10 % infusion, Continuous PRN  insulin lispro (HUMALOG) injection vial 0-4 Units, Q4H  sodium chloride flush 0.9 % injection 5-40 mL, 2 times per day  sodium chloride flush 0.9 % injection 5-40 mL, PRN  0.9 % sodium chloride infusion, PRN  ondansetron (ZOFRAN) injection 4 mg, Q6H PRN  senna (SENOKOT) tablet 8.6 mg, Daily PRN  acetaminophen (TYLENOL) tablet 650 mg, Q6H PRN   Or  acetaminophen (TYLENOL) suppository 650 mg, Q6H PRN  0.9 % sodium chloride infusion, Continuous  enoxaparin (LOVENOX) injection 40 mg, Daily  morphine (PF) injection 2 mg, Q3H PRN   Or  morphine injection 4 mg, Q3H PRN  phenol 1.4 % mouth spray 1 spray, Q2H PRN      Objective:  /75   Pulse 85   Temp 98.3 °F (36.8 °C) (Oral)   Resp 16   Ht 5' 11\" (1.803 m)   Wt 195 lb (88.5 kg)   SpO2 95%   BMI 27.20 kg/m²     Intake/Output Summary (Last 24 hours) at 12/3/2022 0957  Last data filed at 12/3/2022 0615  Gross per 24 hour   Intake 30 ml   Output 1175 ml   Net -1145 ml      Wt Readings from Last 3 Encounters:   12/01/22 195 lb (88.5 kg)       General appearance:  Appears comfortable  Eyes: Sclera clear. Pupils equal.  ENT: Moist oral mucosa. Trachea midline, no adenopathy. Cardiovascular: Regular rhythm, normal S1, S2. No murmur.  No edema in lower extremities  Respiratory: Not using accessory muscles. Good inspiratory effort. Clear to auscultation bilaterally, no wheeze or crackles. GI: Abdomen soft, no tenderness, not distended, normal bowel sounds  Musculoskeletal: No cyanosis in digits, neck supple  Neurology: CN 2-12 grossly intact. No speech or motor deficits  Psych: Normal affect. Alert and oriented in time, place and person  Skin: Warm, dry, normal turgor    Labs and Tests:  CBC:   Recent Labs     12/01/22 2152 12/02/22 0430 12/03/22 0459   WBC 11.3* 7.1 5.9   HGB 13.5 12.6* 11.5*    201 205     BMP:    Recent Labs     12/01/22 2152 12/02/22 0430 12/03/22 0459    141 143   K 4.1 3.8 3.6   CL 99 104 107   CO2 28 24 25   BUN 19 24* 21*   CREATININE 0.9 0.9 0.8   GLUCOSE 178* 146* 103*     Hepatic:   Recent Labs     12/01/22 2152 12/02/22 0430   AST 21 18   ALT 20 14   BILITOT 0.4 0.4   ALKPHOS 92 88       Discussed care with patient             Problem List  Principal Problem:    SBO (small bowel obstruction) ([address]  Resolved Problems:    * No resolved hospital problems. *       Assessment & Plan:   Small bowel obstruction  = Continue conservative management with NG tube decompression n.p.o. status  Continue IV hydration pain medication.  -Electrolytes. Check daily labs. -Monitor closely for any worsening symptoms    DVT prophylaxis on    Diabetes  -Continue to adjust insulin sliding scale for now  -Hypoglycemia protocol    History of previous melanoma.   Hyperplasia stable          Diet: Diet NPO Exceptions are: Ice Chips, Sips of Water with Meds, Sips of Clear Liquids  Code:Full Code  DVT PPX lovenox       Katherin Romero MD   12/3/2022 9:57 AM

## 2022-12-04 ENCOUNTER — APPOINTMENT (OUTPATIENT)
Dept: GENERAL RADIOLOGY | Age: 62
DRG: 390 | End: 2022-12-04
Payer: COMMERCIAL

## 2022-12-04 LAB
ANION GAP SERPL CALCULATED.3IONS-SCNC: 11 MMOL/L (ref 3–16)
ANISOCYTOSIS: ABNORMAL
BANDED NEUTROPHILS RELATIVE PERCENT: 3 % (ref 0–7)
BASOPHILS ABSOLUTE: 0.1 K/UL (ref 0–0.2)
BASOPHILS RELATIVE PERCENT: 2 %
BUN BLDV-MCNC: 17 MG/DL (ref 7–20)
CALCIUM SERPL-MCNC: 8.7 MG/DL (ref 8.3–10.6)
CHLORIDE BLD-SCNC: 107 MMOL/L (ref 99–110)
CO2: 26 MMOL/L (ref 21–32)
CREAT SERPL-MCNC: 0.7 MG/DL (ref 0.8–1.3)
EOSINOPHILS ABSOLUTE: 0.2 K/UL (ref 0–0.6)
EOSINOPHILS RELATIVE PERCENT: 4 %
GFR SERPL CREATININE-BSD FRML MDRD: >60 ML/MIN/{1.73_M2}
GLUCOSE BLD-MCNC: 100 MG/DL (ref 70–99)
GLUCOSE BLD-MCNC: 75 MG/DL (ref 70–99)
GLUCOSE BLD-MCNC: 79 MG/DL (ref 70–99)
GLUCOSE BLD-MCNC: 81 MG/DL (ref 70–99)
GLUCOSE BLD-MCNC: 82 MG/DL (ref 70–99)
GLUCOSE BLD-MCNC: 93 MG/DL (ref 70–99)
HCT VFR BLD CALC: 35.6 % (ref 40.5–52.5)
HEMOGLOBIN: 11.3 G/DL (ref 13.5–17.5)
LYMPHOCYTES ABSOLUTE: 2.8 K/UL (ref 1–5.1)
LYMPHOCYTES RELATIVE PERCENT: 49 %
MAGNESIUM: 2 MG/DL (ref 1.8–2.4)
MCH RBC QN AUTO: 24.8 PG (ref 26–34)
MCHC RBC AUTO-ENTMCNC: 31.9 G/DL (ref 31–36)
MCV RBC AUTO: 77.7 FL (ref 80–100)
MONOCYTES ABSOLUTE: 0.7 K/UL (ref 0–1.3)
MONOCYTES RELATIVE PERCENT: 12 %
NEUTROPHILS ABSOLUTE: 1.9 K/UL (ref 1.7–7.7)
NEUTROPHILS RELATIVE PERCENT: 30 %
PDW BLD-RTO: 16.7 % (ref 12.4–15.4)
PERFORMED ON: ABNORMAL
PERFORMED ON: NORMAL
PHOSPHORUS: 1.6 MG/DL (ref 2.5–4.9)
PLATELET # BLD: 205 K/UL (ref 135–450)
PLATELET SLIDE REVIEW: ADEQUATE
PMV BLD AUTO: 8.4 FL (ref 5–10.5)
POTASSIUM SERPL-SCNC: 3.9 MMOL/L (ref 3.5–5.1)
RBC # BLD: 4.58 M/UL (ref 4.2–5.9)
SLIDE REVIEW: ABNORMAL
SODIUM BLD-SCNC: 144 MMOL/L (ref 136–145)
WBC # BLD: 5.8 K/UL (ref 4–11)

## 2022-12-04 PROCEDURE — 80048 BASIC METABOLIC PNL TOTAL CA: CPT

## 2022-12-04 PROCEDURE — 36415 COLL VENOUS BLD VENIPUNCTURE: CPT

## 2022-12-04 PROCEDURE — 99232 SBSQ HOSP IP/OBS MODERATE 35: CPT | Performed by: SURGERY

## 2022-12-04 PROCEDURE — 1200000000 HC SEMI PRIVATE

## 2022-12-04 PROCEDURE — 74250 X-RAY XM SM INT 1CNTRST STD: CPT

## 2022-12-04 PROCEDURE — 6360000002 HC RX W HCPCS: Performed by: PHYSICIAN ASSISTANT

## 2022-12-04 PROCEDURE — 83735 ASSAY OF MAGNESIUM: CPT

## 2022-12-04 PROCEDURE — 84100 ASSAY OF PHOSPHORUS: CPT

## 2022-12-04 PROCEDURE — 6370000000 HC RX 637 (ALT 250 FOR IP): Performed by: PHYSICIAN ASSISTANT

## 2022-12-04 PROCEDURE — 85025 COMPLETE CBC W/AUTO DIFF WBC: CPT

## 2022-12-04 RX ADMIN — ACETAMINOPHEN 650 MG: 325 TABLET ORAL at 05:49

## 2022-12-04 RX ADMIN — MORPHINE SULFATE 4 MG: 4 INJECTION, SOLUTION INTRAMUSCULAR; INTRAVENOUS at 08:42

## 2022-12-04 RX ADMIN — ENOXAPARIN SODIUM 40 MG: 100 INJECTION SUBCUTANEOUS at 08:42

## 2022-12-04 RX ADMIN — ONDANSETRON 4 MG: 2 INJECTION INTRAMUSCULAR; INTRAVENOUS at 16:23

## 2022-12-04 ASSESSMENT — PAIN SCALES - GENERAL
PAINLEVEL_OUTOF10: 8
PAINLEVEL_OUTOF10: 8
PAINLEVEL_OUTOF10: 1
PAINLEVEL_OUTOF10: 9

## 2022-12-04 ASSESSMENT — PAIN - FUNCTIONAL ASSESSMENT: PAIN_FUNCTIONAL_ASSESSMENT: ACTIVITIES ARE NOT PREVENTED

## 2022-12-04 ASSESSMENT — PAIN DESCRIPTION - LOCATION
LOCATION: BACK
LOCATION: HEAD
LOCATION: HEAD

## 2022-12-04 ASSESSMENT — PAIN DESCRIPTION - DESCRIPTORS: DESCRIPTORS: ACHING

## 2022-12-04 ASSESSMENT — PAIN DESCRIPTION - ORIENTATION: ORIENTATION: OTHER (COMMENT)

## 2022-12-04 NOTE — PROGRESS NOTES
[facility] PROGRESS NOTE    12/4/2022 1:15 PM        Name: Aleksander Guidry . Admitted: 12/1/2022  Primary Care Provider: Rosanne German DO, DO (Tel: [phone])                        Subjective:  . No bowel movement not passing flatus NG tube in place. Denies any chest pain or shortness of breath    Reviewed interval ancillary notes    Current Medications  glucose-vitamin C chewable tablet 4 tablet, PRN  dextrose bolus 10% 125 mL, PRN   Or  dextrose bolus 10% 250 mL, PRN  glucagon (rDNA) injection 1 mg, PRN  dextrose 10 % infusion, Continuous PRN  insulin lispro (HUMALOG) injection vial 0-4 Units, Q4H  sodium chloride flush 0.9 % injection 5-40 mL, 2 times per day  sodium chloride flush 0.9 % injection 5-40 mL, PRN  0.9 % sodium chloride infusion, PRN  ondansetron (ZOFRAN) injection 4 mg, Q6H PRN  senna (SENOKOT) tablet 8.6 mg, Daily PRN  acetaminophen (TYLENOL) tablet 650 mg, Q6H PRN   Or  acetaminophen (TYLENOL) suppository 650 mg, Q6H PRN  0.9 % sodium chloride infusion, Continuous  enoxaparin (LOVENOX) injection 40 mg, Daily  morphine (PF) injection 2 mg, Q3H PRN   Or  morphine injection 4 mg, Q3H PRN  phenol 1.4 % mouth spray 1 spray, Q2H PRN      Objective:  BP (!) 142/80   Pulse 75   Temp 98.2 °F (36.8 °C) (Oral)   Resp 16   Ht 5' 11\" (1.803 m)   Wt 195 lb (88.5 kg)   SpO2 96%   BMI 27.20 kg/m²     Intake/Output Summary (Last 24 hours) at 12/4/2022 1315  Last data filed at 12/4/2022 1147  Gross per 24 hour   Intake --   Output 550 ml   Net -550 ml      Wt Readings from Last 3 Encounters:   12/01/22 195 lb (88.5 kg)       General appearance:  Appears comfortable  Eyes: Sclera clear. Pupils equal.  ENT: Moist oral mucosa. Trachea midline, no adenopathy. Cardiovascular: Regular rhythm, normal S1, S2. No murmur.  No edema in lower extremities  Respiratory: Not using accessory muscles. Good inspiratory effort. Clear to auscultation bilaterally, no wheeze or crackles. GI: Abdomen soft, no tenderness, not distended, normal bowel sounds  Musculoskeletal: No cyanosis in digits, neck supple  Neurology: CN 2-12 grossly intact. No speech or motor deficits  Psych: Normal affect. Alert and oriented in time, place and person  Skin: Warm, dry, normal turgor    Labs and Tests:  CBC:   Recent Labs     12/02/22  0430 12/03/22 0459 12/04/22  0542   WBC 7.1 5.9 5.8   HGB 12.6* 11.5* 11.3*    205 205     BMP:    Recent Labs     12/02/22 0430 12/03/22  0459 12/04/22  0542    143 144   K 3.8 3.6 3.9    107 107   CO2 24 25 26   BUN 24* 21* 17   CREATININE 0.9 0.8 0.7*   GLUCOSE 146* 103* 93     Hepatic:   Recent Labs     12/01/22  2152 12/02/22  0430   AST 21 18   ALT 20 14   BILITOT 0.4 0.4   ALKPHOS 92 88       Discussed care with patient             Problem List  Principal Problem:    SBO (small bowel obstruction) ([facility] [address]  Resolved Problems:    * No resolved hospital problems. *       Assessment & Plan:   Small bowel obstruction  = Continue conservative management with NG tube decompression n.p.o. status  -Passing flatus but no bowel movement  Continue IV hydration pain medication.  -Electrolytes. Check daily labs. -Monitor closely for any worsening symptoms    DVT prophylaxis on    Diabetes  -Continue to adjust insulin sliding scale for now  -Hypoglycemia protocol    History of previous melanoma.   Hyperplasia stable          Diet: Diet NPO Exceptions are: Ice Chips, Sips of Water with Meds, Sips of Clear Liquids  Code:Full Code  DVT PPX lovenox       Lon Andrews MD   12/4/2022 1:15 PM

## 2022-12-04 NOTE — PROGRESS NOTES
Pt had bowel movement loose x3 moderate amount,denies any abdominal pain or discomfort at this time. Passing gas ,walking to br gait slow and steady,reminded to call for assistance.

## 2022-12-04 NOTE — PLAN OF CARE
Problem: Discharge Planning  Goal: Discharge to home or other facility with appropriate resources  Outcome: Progressing  Flowsheets (Taken 12/4/2022 0930)  Discharge to home or other facility with appropriate resources: Refer to discharge planning if patient needs post-hospital services based on physician order or complex needs related to functional status, cognitive ability or social support system     Problem: Pain  Goal: Verbalizes/displays adequate comfort level or baseline comfort level  Outcome: Progressing     Problem: Pain  Goal: Verbalizes/displays adequate comfort level or baseline comfort level  Outcome: Progressing     Problem: Discharge Planning  Goal: Discharge to home or other facility with appropriate resources  Outcome: Progressing  Flowsheets (Taken 12/4/2022 0930)  Discharge to home or other facility with appropriate resources: Refer to discharge planning if patient needs post-hospital services based on physician order or complex needs related to functional status, cognitive ability or social support system

## 2022-12-04 NOTE — PROGRESS NOTES
Jennyfer Farmer is a 60 y.o. male patient.     Chief complaint-small bowel obstruction    HPI-60year-old male seen in follow-up for a small bowel obstruction  Current Facility-Administered Medications   Medication Dose Route Frequency Provider Last Rate Last Admin    glucose-vitamin C chewable tablet 4 tablet  4 tablet Oral PRN Darya Cantrell PA-C        dextrose bolus 10% 125 mL  125 mL IntraVENous PRN Darya Cantrell PA-C        Or    dextrose bolus 10% 250 mL  250 mL IntraVENous PRN Darya Cantrell PA-C        glucagon (rDNA) injection 1 mg  1 mg SubCUTAneous PRN Darya Cantrell PA-C        dextrose 10 % infusion   IntraVENous Continuous PRN Darya Cantrell PA-C        insulin lispro (HUMALOG) injection vial 0-4 Units  0-4 Units SubCUTAneous Q4H Melissa Landeros PA-C        sodium chloride flush 0.9 % injection 5-40 mL  5-40 mL IntraVENous 2 times per day Darya Cantrell PA-C   10 mL at 12/02/22 1045    sodium chloride flush 0.9 % injection 5-40 mL  5-40 mL IntraVENous PRN Darya Cantrell PA-C        0.9 % sodium chloride infusion   IntraVENous PRN Darya Cantrell PA-C        ondansetron [facility]) injection 4 mg  4 mg IntraVENous Q6H PRN Darya Cantrell PA-C   4 mg at 12/03/22 1549    senna (SENOKOT) tablet 8.6 mg  1 tablet Oral Daily PRN Darya Cantrell PA-C        acetaminophen (TYLENOL) tablet 650 mg  650 mg Oral Q6H PRN Darya Cantrell PA-C   650 mg at 12/04/22 0549    Or    acetaminophen (TYLENOL) suppository 650 mg  650 mg Rectal Q6H PRN Darya Cantrell PA-C        0.9 % sodium chloride infusion   IntraVENous Continuous Darya Cantrell PA-C 100 mL/hr at 12/03/22 0506 New Bag at 12/03/22 0506    enoxaparin (LOVENOX) injection 40 mg  40 mg SubCUTAneous Daily Darya Cantrell PA-C   40 mg at 12/04/22 0842    morphine (PF) injection 2 mg  2 mg IntraVENous Q3H PRN Darya Cantrell PA-C   2 mg at 12/03/22 2337    Or    morphine injection 4 mg  4 mg IntraVENous Q3H PRN Emil Dumont PA-C   4 mg at 12/04/22 0842    phenol 1.4 % mouth spray 1 spray  1 spray Mouth/Throat Q2H PRN Emil Dumont PA-C         No Known Allergies  Principal Problem:    SBO (small bowel obstruction) ([facility])  Resolved Problems:    * No resolved hospital problems. *    Blood pressure (!) 142/80, pulse 75, temperature 98.2 °F (36.8 °C), temperature source Oral, resp. rate 16, height 5' 11\" (1.803 m), weight 195 lb (88.5 kg), SpO2 96 %. Subjective:  Symptoms:  Stable. Diet:  NPO. Activity level: Normal.    Pain:  He reports no pain. Objective:  General Appearance:  Comfortable. Vital signs: (most recent): Blood pressure (!) 142/80, pulse 75, temperature 98.2 °F (36.8 °C), temperature source Oral, resp. rate 16, height 5' 11\" (1.803 m), weight 195 lb (88.5 kg), SpO2 96 %. Output: Producing urine and no stool output (Passing flatus). Abdomen: Abdomen is soft and non-distended. There is no abdominal tenderness. Neurological: Patient is alert and oriented to person, place and time. Skin:  Warm and dry. Assessment & Plan 78-year-old male seen in follow-up for a small bowel obstruction. Feeling better today. No abdominal pain. Passing some flatus. NG tube output volume not recorded. Will check small bowel follow-through today.     Wilmer Atkins MD  12/4/2022

## 2022-12-05 ENCOUNTER — APPOINTMENT (OUTPATIENT)
Dept: GENERAL RADIOLOGY | Age: 62
DRG: 390 | End: 2022-12-05
Payer: COMMERCIAL

## 2022-12-05 LAB
ANION GAP SERPL CALCULATED.3IONS-SCNC: 13 MMOL/L (ref 3–16)
ANISOCYTOSIS: ABNORMAL
ATYPICAL LYMPHOCYTE RELATIVE PERCENT: 6 % (ref 0–6)
BASOPHILS ABSOLUTE: 0 K/UL (ref 0–0.2)
BASOPHILS RELATIVE PERCENT: 0 %
BUN BLDV-MCNC: 15 MG/DL (ref 7–20)
CALCIUM SERPL-MCNC: 8.6 MG/DL (ref 8.3–10.6)
CHLORIDE BLD-SCNC: 108 MMOL/L (ref 99–110)
CO2: 26 MMOL/L (ref 21–32)
CREAT SERPL-MCNC: 0.7 MG/DL (ref 0.8–1.3)
EOSINOPHILS ABSOLUTE: 0.2 K/UL (ref 0–0.6)
EOSINOPHILS RELATIVE PERCENT: 3 %
GFR SERPL CREATININE-BSD FRML MDRD: >60 ML/MIN/{1.73_M2}
GLUCOSE BLD-MCNC: 117 MG/DL (ref 70–99)
GLUCOSE BLD-MCNC: 118 MG/DL (ref 70–99)
GLUCOSE BLD-MCNC: 134 MG/DL (ref 70–99)
GLUCOSE BLD-MCNC: 79 MG/DL (ref 70–99)
GLUCOSE BLD-MCNC: 81 MG/DL (ref 70–99)
GLUCOSE BLD-MCNC: 95 MG/DL (ref 70–99)
HCT VFR BLD CALC: 37 % (ref 40.5–52.5)
HEMOGLOBIN: 11.5 G/DL (ref 13.5–17.5)
LYMPHOCYTES ABSOLUTE: 3.6 K/UL (ref 1–5.1)
LYMPHOCYTES RELATIVE PERCENT: 56 %
MAGNESIUM: 1.8 MG/DL (ref 1.8–2.4)
MCH RBC QN AUTO: 24 PG (ref 26–34)
MCHC RBC AUTO-ENTMCNC: 31.1 G/DL (ref 31–36)
MCV RBC AUTO: 77.2 FL (ref 80–100)
MONOCYTES ABSOLUTE: 0.7 K/UL (ref 0–1.3)
MONOCYTES RELATIVE PERCENT: 12 %
NEUTROPHILS ABSOLUTE: 1.3 K/UL (ref 1.7–7.7)
NEUTROPHILS RELATIVE PERCENT: 23 %
PDW BLD-RTO: 16.6 % (ref 12.4–15.4)
PERFORMED ON: ABNORMAL
PERFORMED ON: ABNORMAL
PERFORMED ON: NORMAL
PHOSPHORUS: 1.8 MG/DL (ref 2.5–4.9)
PLATELET # BLD: 195 K/UL (ref 135–450)
PLATELET SLIDE REVIEW: ADEQUATE
PMV BLD AUTO: 8 FL (ref 5–10.5)
POTASSIUM SERPL-SCNC: 3.5 MMOL/L (ref 3.5–5.1)
RBC # BLD: 4.78 M/UL (ref 4.2–5.9)
SLIDE REVIEW: ABNORMAL
SODIUM BLD-SCNC: 147 MMOL/L (ref 136–145)
WBC # BLD: 5.8 K/UL (ref 4–11)

## 2022-12-05 PROCEDURE — APPNB30 APP NON BILLABLE TIME 0-30 MINS: Performed by: NURSE PRACTITIONER

## 2022-12-05 PROCEDURE — 1200000000 HC SEMI PRIVATE

## 2022-12-05 PROCEDURE — 6360000002 HC RX W HCPCS: Performed by: PHYSICIAN ASSISTANT

## 2022-12-05 PROCEDURE — 84100 ASSAY OF PHOSPHORUS: CPT

## 2022-12-05 PROCEDURE — 99232 SBSQ HOSP IP/OBS MODERATE 35: CPT | Performed by: SURGERY

## 2022-12-05 PROCEDURE — 80048 BASIC METABOLIC PNL TOTAL CA: CPT

## 2022-12-05 PROCEDURE — 2580000003 HC RX 258: Performed by: PHYSICIAN ASSISTANT

## 2022-12-05 PROCEDURE — 2580000003 HC RX 258: Performed by: NURSE PRACTITIONER

## 2022-12-05 PROCEDURE — 2500000003 HC RX 250 WO HCPCS: Performed by: NURSE PRACTITIONER

## 2022-12-05 PROCEDURE — 74019 RADEX ABDOMEN 2 VIEWS: CPT

## 2022-12-05 PROCEDURE — 83735 ASSAY OF MAGNESIUM: CPT

## 2022-12-05 PROCEDURE — 85025 COMPLETE CBC W/AUTO DIFF WBC: CPT

## 2022-12-05 PROCEDURE — 36415 COLL VENOUS BLD VENIPUNCTURE: CPT

## 2022-12-05 PROCEDURE — APPSS15 APP SPLIT SHARED TIME 0-15 MINUTES: Performed by: NURSE PRACTITIONER

## 2022-12-05 RX ADMIN — ENOXAPARIN SODIUM 40 MG: 100 INJECTION SUBCUTANEOUS at 08:48

## 2022-12-05 RX ADMIN — Medication 10 ML: at 02:48

## 2022-12-05 RX ADMIN — POTASSIUM PHOSPHATE, MONOBASIC AND POTASSIUM PHOSPHATE, DIBASIC 20 MMOL: 224; 236 INJECTION, SOLUTION, CONCENTRATE INTRAVENOUS at 11:47

## 2022-12-05 RX ADMIN — MORPHINE SULFATE 2 MG: 2 INJECTION, SOLUTION INTRAMUSCULAR; INTRAVENOUS at 02:50

## 2022-12-05 RX ADMIN — SODIUM CHLORIDE: 9 INJECTION, SOLUTION INTRAVENOUS at 02:46

## 2022-12-05 RX ADMIN — ONDANSETRON 4 MG: 2 INJECTION INTRAMUSCULAR; INTRAVENOUS at 05:03

## 2022-12-05 RX ADMIN — SODIUM CHLORIDE: 9 INJECTION, SOLUTION INTRAVENOUS at 20:04

## 2022-12-05 RX ADMIN — Medication 10 ML: at 08:48

## 2022-12-05 ASSESSMENT — PAIN SCALES - GENERAL
PAINLEVEL_OUTOF10: 0
PAINLEVEL_OUTOF10: 4
PAINLEVEL_OUTOF10: 4
PAINLEVEL_OUTOF10: 0

## 2022-12-05 ASSESSMENT — PAIN DESCRIPTION - LOCATION: LOCATION: HEAD

## 2022-12-05 NOTE — PROGRESS NOTES
[address] [facility]        Progress Note    Patient Name: Zainab Mcnamara  MRN: [id number]  [place]: 1960  Date of Evaluation: 2022    Chief Complaint: Abdominal pain    Subjective:  No acute events overnight  Pain resolved  No nausea or vomiting, NGT in place  Passing flatus and several stools since small bowel follow through, denies bloating  Resting in bed at this time      Vital Signs:  Patient Vitals for the past 24 hrs:   BP Temp Temp src Pulse Resp SpO2   22 0250 (!) 146/75 97.8 °F (36.6 °C) Oral 79 16 96 %   22 2130 (!) 151/79 97.6 °F (36.4 °C) Oral 72 16 96 %   22 1645 130/77 98.2 °F (36.8 °C) Oral 76 18 --      TEMPERATURE HISTORY 24H: Temp (24hrs), Av.9 °F (36.6 °C), Min:97.6 °F (36.4 °C), Max:98.2 °F (36.8 °C)    BLOOD PRESSURE HISTORY: Systolic (56YQP), SON:478 , Min:130 , XNH:105    Diastolic (86PKB), WDP:84, Min:75, Max:80      Intake/Output:  I/O last 3 completed shifts: In: 36 [P.O.:600; I.V.:1000; NG/GT:30]  Out: 2680 [Urine:1580; Emesis/NG output:1100]  No intake/output data recorded. Drain/tube Output:       Physical Exam:  General: awake, alert, oriented to  person, place, time  Cardiovascular:  regular rate and rhythm and no murmur noted  Lungs: clear to auscultation  Abdomen: soft, nontender, nondistended, bowel sounds normal     Labs:  CBC:    Recent Labs     22  0542 22  0415   WBC 5.9 5.8 5.8   HGB 11.5* 11.3* 11.5*   HCT 36.8* 35.6* 37.0*    205 195     BMP:    Recent Labs     22  0542 22  0415    144 147*   K 3.6 3.9 3.5    107 108   CO2 25 26 26   BUN 21* 17 15   CREATININE 0.8 0.7* 0.7*   GLUCOSE 103* 93 134*     Hepatic:  No results for input(s): AST, ALT, ALB, BILITOT, ALKPHOS in the last 72 hours.   Amylase:  No results found for: AMYLASE  Lipase:    Lab Results   Component Value Date/Time    LIPASE 20.0 2022 09:52 PM      Mag: Lab Results   Component Value Date/Time    MG 1.80 12/05/2022 04:15 AM    MG 2.00 12/04/2022 05:42 AM     Phos:     Lab Results   Component Value Date/Time    PHOS 1.8 12/05/2022 04:15 AM    PHOS 1.6 12/04/2022 05:42 AM      Coags:   Lab Results   Component Value Date/Time    PROTIME 13.7 12/02/2022 04:30 AM    INR 1.06 12/02/2022 04:30 AM    APTT 36.2 12/02/2022 04:30 AM       Cultures:  Anaerobic culture  No results found for: LABANAE  Fungus stain  No results found for requested labs within last 30 days. Gram stain  No results found for requested labs within last 30 days. Organism  No results found for: [facility]  Surgical culture  No results found for: CXSURG  Blood culture 1  No results found for requested labs within last 30 days. Blood culture 2  No results found for requested labs within last 30 days. Fecal occult  No results found for requested labs within last 30 days. GI bacterial pathogens by PCR  No results found for requested labs within last 30 days. C. difficile  No results found for requested labs within last 30 days. Urine culture  No results found for: Kimberlyra Perez    Pathology:  No relevant pathology     Imaging:  I have personally reviewed the following films:  XR ABDOMEN (2 VIEWS) [[id number]]    Collected: 12/05/22 1004    Updated: 12/05/22 1010    Narrative:     EXAMINATION:   TWO XRAY VIEWS OF THE ABDOMEN     12/5/2022 9:47 am     COMPARISON:   12/04/2022     HISTORY:   ORDERING SYSTEM PROVIDED HISTORY: SBO   TECHNOLOGIST PROVIDED HISTORY:   Reason for exam:->SBO   Reason for Exam: SBO     FINDINGS:   Contrast is seen throughout the length of the colon. Air-fluid levels are   seen on the upright view. Some contrast is seen within small bowel though   the degree of small-bowel contrast has decreased as compared to prior. Small   bowel loops are dilated.     Impression:     Persistent though decreased contrast within dilated small bowel as compared   to prior with increased contrast in the colon; the findings can be in keeping   with partial small bowel obstruction. FL SMALL BOWEL FOLLOW THROUGH ONLY    Result Date: 12/5/2022  EXAMINATION: SMALL BOWEL FOLLOW THROUGH SERIES 12/4/2022 TECHNIQUE: Small bowel follow through series was performed with overhead images and spot images. FLUOROSCOPY DOSE AND TYPE OR TIME AND EXPOSURES: 8 images were obtained. COMPARISON: None HISTORY: ORDERING SYSTEM PROVIDED HISTORY: sbo TECHNOLOGIST PROVIDED HISTORY: Reason for exam:->sbo Reason for Exam: sbo FINDINGS:  image of the abdomen demonstrates mildly distended small bowel. Stool noted in the colon. Moderate lumbar degenerative changes identified. Left hip arthroplasty. Severe arthrosis right hip. The stomach and duodenum are normal.  Normal caliber and appearance of the jejunum. Moderately dilated ileum identified measuring up to 4.6 cm. Small bowel transit time is delayed. Contrast is noted in the colon after 4 hours. The colon is not dilated. Findings are highly suggestive of partial small bowel obstruction, with delayed small bowel transit time. XR ABDOMEN (2 VIEWS)    Result Date: 12/3/2022  EXAMINATION: TWO XRAY VIEWS OF THE ABDOMEN 12/3/2022 5:08 pm COMPARISON: CT abdomen and pelvis 12/01/2022. HISTORY: ORDERING SYSTEM PROVIDED HISTORY: SBO TECHNOLOGIST PROVIDED HISTORY: Reason for exam:->SBO Reason for Exam: SBO FINDINGS: The tip of the enteric tube is in the gastric fundus. The side port is at the level of the gastroesophageal junction. Air-filled distended loops of small bowel are seen in the left abdomen with air-fluid levels on the upright view. No evidence of pneumoperitoneum. No acute osseous abnormality. 1. Air-filled distended loops of small bowel in the left abdomen with air-fluid levels on the upright view compatible with an obstruction.  2. Tip of the enteric tube in the gastric fundus and side-port at the level of the gastroesophageal testing), interviewing and counseling patient and present family members, performing physical exam, documentation of my findings and subsequent follow up of ordered medication and testing, placing referrals and communication with patient care providers, coordinating future care as well as documentation in the EHR. This encompassed more than 50% of the total encounter time. In summary, my findings and plan are the following:   Mr. Tr Killian is a 60 y.o. male who presents with   Small bowel obstruction  Diabetes     Plan:  1. Medically small bowel obstruction is improving, NG output decreasing, passing flatus and stool, abdominal distention much improved and imaging shows contrast passing to colon. Does not need surgical intervention unless symptoms recur  2. Remove NG and advance to clear liquids  3. Monitor bowel function, passing flatus and stool  4. IV fluids, monitor replace electrolytes as needed  5. Increase activity as able  6. Minimize pain medication, altered bowel function  7. Defer management of remainder of medical comorbidities to primary consulting teams  8. Discharge planning, anticipate 1 to 2 days from surgical standpoint if continues to improve    Rey Lira MD, FACS  12/5/2022  3:38 PM

## 2022-12-05 NOTE — PLAN OF CARE
Problem: Discharge Planning  Goal: Discharge to home or other facility with appropriate resources  12/5/2022 1019 by Juan Diego Montaño RN  Outcome: Progressing  Flowsheets (Taken 12/5/2022 0800)  Discharge to home or other facility with appropriate resources: Identify barriers to discharge with patient and caregiver  12/5/2022 0028 by Pennie Strauss RN  Outcome: Progressing  Flowsheets (Taken 12/4/2022 1659 by Juan Diego Montaño RN)  Discharge to home or other facility with appropriate resources: Identify barriers to discharge with patient and caregiver     Problem: Pain  Goal: Verbalizes/displays adequate comfort level or baseline comfort level  12/5/2022 1019 by Juan Diego Montaño RN  Outcome: Progressing  12/5/2022 0028 by Pennie Strauss RN  Outcome: Progressing

## 2022-12-05 NOTE — PLAN OF CARE
Problem: Discharge Planning  Goal: Discharge to home or other facility with appropriate resources  12/5/2022 0028 by Louis Black RN  Outcome: Progressing    Problem: Pain  Goal: Verbalizes/displays adequate comfort level or baseline comfort level  12/5/2022 0028 by Louis Black RN  Outcome: Progressing

## 2022-12-05 NOTE — CARE COORDINATION
Discharge Planning Note:    Chart reviewed and it appears that patient has minimal needs for discharge at this time. Discussed with patient and requested that case management be notified if discharge needs are identified.     - Current discharge plan is still for the patient to return home. Case management will continue to follow progress and update discharge plan as needed.       Risk of Readmission Score: 9%    JOANNA Reynoso RN    [employer]  Phone: [phone]

## 2022-12-06 VITALS
DIASTOLIC BLOOD PRESSURE: 70 MMHG | RESPIRATION RATE: 16 BRPM | HEART RATE: 68 BPM | SYSTOLIC BLOOD PRESSURE: 128 MMHG | BODY MASS INDEX: 27.3 KG/M2 | TEMPERATURE: 98 F | WEIGHT: 195 LBS | OXYGEN SATURATION: 97 % | HEIGHT: 71 IN

## 2022-12-06 LAB
ANION GAP SERPL CALCULATED.3IONS-SCNC: 8 MMOL/L (ref 3–16)
ANISOCYTOSIS: ABNORMAL
BASOPHILS ABSOLUTE: 0 K/UL (ref 0–0.2)
BASOPHILS RELATIVE PERCENT: 0 %
BUN BLDV-MCNC: 8 MG/DL (ref 7–20)
CALCIUM SERPL-MCNC: 8.1 MG/DL (ref 8.3–10.6)
CHLORIDE BLD-SCNC: 106 MMOL/L (ref 99–110)
CO2: 27 MMOL/L (ref 21–32)
CREAT SERPL-MCNC: 0.7 MG/DL (ref 0.8–1.3)
EOSINOPHILS ABSOLUTE: 0.1 K/UL (ref 0–0.6)
EOSINOPHILS RELATIVE PERCENT: 2 %
GFR SERPL CREATININE-BSD FRML MDRD: >60 ML/MIN/{1.73_M2}
GLUCOSE BLD-MCNC: 101 MG/DL (ref 70–99)
GLUCOSE BLD-MCNC: 117 MG/DL (ref 70–99)
GLUCOSE BLD-MCNC: 120 MG/DL (ref 70–99)
GLUCOSE BLD-MCNC: 133 MG/DL (ref 70–99)
HCT VFR BLD CALC: 35.9 % (ref 40.5–52.5)
HEMOGLOBIN: 11.1 G/DL (ref 13.5–17.5)
LYMPHOCYTES ABSOLUTE: 4.8 K/UL (ref 1–5.1)
LYMPHOCYTES RELATIVE PERCENT: 66 %
MCH RBC QN AUTO: 23.6 PG (ref 26–34)
MCHC RBC AUTO-ENTMCNC: 30.8 G/DL (ref 31–36)
MCV RBC AUTO: 76.6 FL (ref 80–100)
MONOCYTES ABSOLUTE: 0.7 K/UL (ref 0–1.3)
MONOCYTES RELATIVE PERCENT: 9 %
NEUTROPHILS ABSOLUTE: 1.7 K/UL (ref 1.7–7.7)
NEUTROPHILS RELATIVE PERCENT: 23 %
PDW BLD-RTO: 16.4 % (ref 12.4–15.4)
PERFORMED ON: ABNORMAL
PLATELET # BLD: 193 K/UL (ref 135–450)
PMV BLD AUTO: 7.8 FL (ref 5–10.5)
POTASSIUM SERPL-SCNC: 3.5 MMOL/L (ref 3.5–5.1)
RBC # BLD: 4.68 M/UL (ref 4.2–5.9)
SODIUM BLD-SCNC: 141 MMOL/L (ref 136–145)
WBC # BLD: 7.3 K/UL (ref 4–11)

## 2022-12-06 PROCEDURE — 2580000003 HC RX 258: Performed by: NURSE PRACTITIONER

## 2022-12-06 PROCEDURE — 99232 SBSQ HOSP IP/OBS MODERATE 35: CPT | Performed by: SURGERY

## 2022-12-06 PROCEDURE — APPSS15 APP SPLIT SHARED TIME 0-15 MINUTES: Performed by: NURSE PRACTITIONER

## 2022-12-06 PROCEDURE — 80048 BASIC METABOLIC PNL TOTAL CA: CPT

## 2022-12-06 PROCEDURE — 6360000002 HC RX W HCPCS: Performed by: PHYSICIAN ASSISTANT

## 2022-12-06 PROCEDURE — APPNB30 APP NON BILLABLE TIME 0-30 MINS: Performed by: NURSE PRACTITIONER

## 2022-12-06 PROCEDURE — 85025 COMPLETE CBC W/AUTO DIFF WBC: CPT

## 2022-12-06 RX ADMIN — SODIUM CHLORIDE: 9 INJECTION, SOLUTION INTRAVENOUS at 08:20

## 2022-12-06 RX ADMIN — MORPHINE SULFATE 2 MG: 2 INJECTION, SOLUTION INTRAMUSCULAR; INTRAVENOUS at 04:35

## 2022-12-06 RX ADMIN — ENOXAPARIN SODIUM 40 MG: 100 INJECTION SUBCUTANEOUS at 08:20

## 2022-12-06 ASSESSMENT — PAIN SCALES - GENERAL: PAINLEVEL_OUTOF10: 8

## 2022-12-06 ASSESSMENT — PAIN DESCRIPTION - LOCATION: LOCATION: HEAD

## 2022-12-06 NOTE — PLAN OF CARE
Problem: Discharge Planning  Goal: Discharge to home or other facility with appropriate resources  12/6/2022 0805 by Elham Ignacio RN  Outcome: Progressing     Problem: Pain  Goal: Verbalizes/displays adequate comfort level or baseline comfort level  12/6/2022 0822 by Elham Ignacio RN  Outcome: Progressing

## 2022-12-06 NOTE — PROGRESS NOTES
[facility]        Progress Note    Patient Name: Yvonne Rinaldi  MRN: [id number]  [place]: 1960  Date of Evaluation: 2022    Chief Complaint: Abdominal pain    Subjective:  No acute events overnight  Pain resolved  No nausea or vomiting since NGT removed, tolerating clear liquids and appetite improving  Passing flatus and stool, denies bloating  Resting in bed at this time      Vital Signs:  Patient Vitals for the past 24 hrs:   BP Temp Temp src Pulse Resp SpO2   22 0815 112/67 98 °F (36.7 °C) Oral 64 16 96 %   22 0435 (!) 157/89 97.9 °F (36.6 °C) Oral 64 16 97 %   22 2145 128/77 97.8 °F (36.6 °C) Oral 68 18 96 %   22 1300 (!) 141/75 97 °F (36.1 °C) Oral 77 18 --        TEMPERATURE HISTORY 24H: Temp (24hrs), Av.7 °F (36.5 °C), Min:97 °F (36.1 °C), Max:98 °F (36.7 °C)    BLOOD PRESSURE HISTORY: Systolic (65FED), DGJ:103 , Min:112 , VDJ:953    Diastolic (40QHB), ORN:13, Min:67, Max:89      Intake/Output:  I/O last 3 completed shifts: In: 630 [P.O.:600; NG/GT:30]  Out: 2130 [Urine:1030; Emesis/NG output:1100]  No intake/output data recorded. Drain/tube Output:       Physical Exam:  General: awake, alert, oriented to  person, place, time  Cardiovascular:  regular rate and rhythm and no murmur noted  Lungs: clear to auscultation  Abdomen: soft, nontender, nondistended, bowel sounds normal     Labs:  CBC:    Recent Labs     22   WBC 5.8 5.8 7.3   HGB 11.3* 11.5* 11.1*   HCT 35.6* 37.0* 35.9*    195 193       BMP:    Recent Labs     2242 22  0410    147* 141   K 3.9 3.5 3.5    108 106   CO2 26 26 27   BUN 17 15 8   CREATININE 0.7* 0.7* 0.7*   GLUCOSE 93 134* 117*       Hepatic:  No results for input(s): AST, ALT, ALB, BILITOT, ALKPHOS in the last 72 hours.   Amylase:  No results found for: AMYLASE  Lipase:    Lab Results   Component Value Date/Time    LIPASE 20.0 12/01/2022 09:52 PM        Mag:    Lab Results   Component Value Date/Time    MG 1.80 12/05/2022 04:15 AM    MG 2.00 12/04/2022 05:42 AM     Phos:     Lab Results   Component Value Date/Time    PHOS 1.8 12/05/2022 04:15 AM    PHOS 1.6 12/04/2022 05:42 AM      Coags:   Lab Results   Component Value Date/Time    PROTIME 13.7 12/02/2022 04:30 AM    INR 1.06 12/02/2022 04:30 AM    APTT 36.2 12/02/2022 04:30 AM       Cultures:  Anaerobic culture  No results found for: LABANAE  Fungus stain  No results found for requested labs within last 30 days. Gram stain  No results found for requested labs within last 30 days. Organism  No results found for: [facility]  Surgical culture  No results found for: CXSURG  Blood culture 1  No results found for requested labs within last 30 days. Blood culture 2  No results found for requested labs within last 30 days. Fecal occult  No results found for requested labs within last 30 days. GI bacterial pathogens by PCR  No results found for requested labs within last 30 days. C. difficile  No results found for requested labs within last 30 days. Urine culture  No results found for: LABURIN    Pathology:  No relevant pathology     Imaging:  I have personally reviewed the following films:    FL SMALL BOWEL FOLLOW THROUGH ONLY    Result Date: 12/5/2022  EXAMINATION: SMALL BOWEL FOLLOW THROUGH SERIES 12/4/2022 TECHNIQUE: Small bowel follow through series was performed with overhead images and spot images. FLUOROSCOPY DOSE AND TYPE OR TIME AND EXPOSURES: 8 images were obtained. COMPARISON: None HISTORY: ORDERING SYSTEM PROVIDED HISTORY: sbo TECHNOLOGIST PROVIDED HISTORY: Reason for exam:->sbo Reason for Exam: sbo FINDINGS:  image of the abdomen demonstrates mildly distended small bowel. Stool noted in the colon. Moderate lumbar degenerative changes identified. Left hip arthroplasty. Severe arthrosis right hip.  The stomach and duodenum are normal.  Normal caliber and appearance of the jejunum. Moderately dilated ileum identified measuring up to 4.6 cm. Small bowel transit time is delayed. Contrast is noted in the colon after 4 hours. The colon is not dilated. Findings are highly suggestive of partial small bowel obstruction, with delayed small bowel transit time. XR ABDOMEN (2 VIEWS)    Result Date: 12/5/2022  EXAMINATION: TWO XRAY VIEWS OF THE ABDOMEN 12/5/2022 9:47 am COMPARISON: 12/04/2022 HISTORY: ORDERING SYSTEM PROVIDED HISTORY: SBO TECHNOLOGIST PROVIDED HISTORY: Reason for exam:->SBO Reason for Exam: SBO FINDINGS: Contrast is seen throughout the length of the colon. Air-fluid levels are seen on the upright view. Some contrast is seen within small bowel though the degree of small-bowel contrast has decreased as compared to prior. Small bowel loops are dilated. Persistent though decreased contrast within dilated small bowel as compared to prior with increased contrast in the colon; the findings can be in keeping with partial small bowel obstruction. Scheduled Meds:   insulin lispro  0-4 Units SubCUTAneous Q4H    sodium chloride flush  5-40 mL IntraVENous 2 times per day    enoxaparin  40 mg SubCUTAneous Daily     Continuous Infusions:   dextrose      sodium chloride       PRN Meds:.glucose, dextrose bolus **OR** dextrose bolus, glucagon (rDNA), dextrose, sodium chloride flush, sodium chloride, ondansetron, senna, acetaminophen **OR** acetaminophen, phenol      Assessment:  60 y.o. male admitted with   1. SBO (small bowel obstruction) (HCC)        Small bowel obstruction  Diabetes       Plan:  1. Pain resolved, abdominal exam benign, passing flatus and stool, tolerating clear liquids since NGT removed, vitals/labs stable; continued supportive care, no further imaging or intervention planned  2. Advance to regular diet as tolerated; monitor bowel function  3. Stop IV hydration; monitor and correct electrolytes  4.  Activity as tolerated, ambulate TID, up to chair for all meals  5. PRN analgesics and antiemetics--stop narcotics  6. DVT prophylaxis with  Lovenox  7. Management of medical comorbid etiologies per primary team and consulting services  8. Disposition: Okay for discharge home later today if tolerating regular diet; follow up with Dr. Allyn Dumas as needed    EDUCATION:  Educated patient on plan of care and disease process--all questions answered. Plans discussed with patient and nursing. Reviewed and discussed with Dr. Allyn Dumas. Signed:  MILLIE Ricketts - CNP  12/6/2022 10:58 AM    I have personally performed a face to face diagnostic evaluation on this patient. I have interviewed and examined the patient and I agree with the assessment above. Time was spent reviewing patient chart (including but not limited to notes, labs, imaging and other testing), interviewing and counseling patient and present family members, performing physical exam, documentation of my findings and subsequent follow up of ordered medication and testing, placing referrals and communication with patient care providers, coordinating future care as well as documentation in the EHR. This encompassed more than 50% of the total encounter time. In summary, my findings and plan are the following:   Mr. Sharri Milan is a 60 y.o. male who presents with   Small bowel obstruction  Diabetes      Plan:  1. Small bowel obstruction clinically resolved, does not need intervention  2. Advance diet  3. Bowel function returned, passing stool  4. Increase activity as able  5. Defer management of remainder of medical comorbidities to primary consulting teams  6. Discharge planning, may discharge from surgical standpoint if tolerates diet, follow in office as needed after discharge    [id number] Loc[facility] within [facility] [address].  Allyn Dumas MD, FACS  12/6/2022  7:28 PM

## 2022-12-06 NOTE — PROGRESS NOTES
2312: Assessment complete, VSS, BS active, pt tolerating clear liquid diet, BM x2 today, denies pain or nausea, discussed care plan, pt mutually agrees, ambulated with pt in the hallway 1 large lap, tolerated well, encouraged pt to ambulate often and use IS, no other needs at this time.   Fransisco Hurtado RN

## 2022-12-06 NOTE — PROGRESS NOTES
[facility]    12/6/2022 11:57 AM        Name: Noel France . Admitted: 12/1/2022  Primary Care Provider: Vidal Ordonez DO, DO (Tel: [phone])                        Subjective:  . Patient had 3 loose bowel movement yesterday. NG tube  . Patient taking ICU. Reviewed interval ancillary notes    Current Medications  glucose-vitamin C chewable tablet 4 tablet, PRN  dextrose bolus 10% 125 mL, PRN   Or  dextrose bolus 10% 250 mL, PRN  glucagon (rDNA) injection 1 mg, PRN  dextrose 10 % infusion, Continuous PRN  insulin lispro (HUMALOG) injection vial 0-4 Units, Q4H  sodium chloride flush 0.9 % injection 5-40 mL, 2 times per day  sodium chloride flush 0.9 % injection 5-40 mL, PRN  0.9 % sodium chloride infusion, PRN  ondansetron (ZOFRAN) injection 4 mg, Q6H PRN  senna (SENOKOT) tablet 8.6 mg, Daily PRN  acetaminophen (TYLENOL) tablet 650 mg, Q6H PRN   Or  acetaminophen (TYLENOL) suppository 650 mg, Q6H PRN  enoxaparin (LOVENOX) injection 40 mg, Daily  phenol 1.4 % mouth spray 1 spray, Q2H PRN      Objective:  /67   Pulse 64   Temp 98 °F (36.7 °C) (Oral)   Resp 16   Ht 5' 11\" (1.803 m)   Wt 195 lb (88.5 kg)   SpO2 96%   BMI 27.20 kg/m²   No intake or output data in the 24 hours ending 12/06/22 1157     Wt Readings from Last 3 Encounters:   12/01/22 195 lb (88.5 kg)       General appearance:  Appears comfortable  Eyes: Sclera clear. Pupils equal.  ENT: Moist oral mucosa. Trachea midline, no adenopathy. Cardiovascular: Regular rhythm, normal S1, S2. No murmur. No edema in lower extremities  Respiratory: Not using accessory muscles. Good inspiratory effort. Clear to auscultation bilaterally, no wheeze or crackles.    GI: Abdomen soft, no tenderness, not distended, normal bowel sounds  Musculoskeletal: No cyanosis in digits, neck supple  Neurology: CN 2-12 grossly intact. No speech or motor deficits  Psych: Normal affect. Alert and oriented in time, place and person  Skin: Warm, dry, normal turgor    Labs and Tests:  CBC:   Recent Labs     12/04/22  0542 12/05/22 0415 12/06/22 0410   WBC 5.8 5.8 7.3   HGB 11.3* 11.5* 11.1*    195 193     BMP:    Recent Labs     12/04/22  0542 12/05/22 0415 12/06/22 0410    147* 141   K 3.9 3.5 3.5    108 106   CO2 26 26 27   BUN 17 15 8   CREATININE 0.7* 0.7* 0.7*   GLUCOSE 93 134* 117*     Hepatic:   No results for input(s): AST, ALT, ALB, BILITOT, ALKPHOS in the last 72 hours. Discussed care with patient             Problem List  Principal Problem:    SBO (small bowel obstruction) ([address]  Resolved Problems:    * No resolved hospital problems. *       Assessment & Plan:   Small bowel obstruction   Doing well   Diet per gen surg      Diabetes  -Continue to adjust insulin sliding scale for now  -Hypoglycemia protocol    History of previous melanoma. Diet: ADULT DIET;  Regular  Code:Full Code  DVT PPX lovenox   Dispo once ok with gen surg       Dick Simons MD   12/6/2022 11:57 AM

## 2022-12-06 NOTE — PROGRESS NOTES
Discharge instructions gone over with patient. All questions answered. IV removed with no complications. Awaiting wife for ride home.

## 2022-12-06 NOTE — PLAN OF CARE
Problem: Discharge Planning  Goal: Discharge to home or other facility with appropriate resources  Outcome: Progressing       Problem: Pain  Goal: Verbalizes/displays adequate comfort level or baseline comfort level  Outcome: Progressing

## 2022-12-07 ENCOUNTER — TELEPHONE (OUTPATIENT)
Dept: SURGERY | Age: 62
End: 2022-12-07

## 2022-12-07 ENCOUNTER — HOSPITAL ENCOUNTER (INPATIENT)
Age: 62
LOS: 8 days | Discharge: HOME | End: 2022-12-16
Attending: INTERNAL MEDICINE | Admitting: INTERNAL MEDICINE
Payer: COMMERCIAL

## 2022-12-07 ENCOUNTER — APPOINTMENT (OUTPATIENT)
Dept: CT IMAGING | Age: 62
End: 2022-12-07
Payer: COMMERCIAL

## 2022-12-07 ENCOUNTER — APPOINTMENT (OUTPATIENT)
Dept: GENERAL RADIOLOGY | Age: 62
End: 2022-12-07
Payer: COMMERCIAL

## 2022-12-07 DIAGNOSIS — K56.609 SBO (SMALL BOWEL OBSTRUCTION) (HCC): Primary | ICD-10-CM

## 2022-12-07 LAB
A/G RATIO: 1.1 (ref 1.1–2.2)
ALBUMIN SERPL-MCNC: 3.7 G/DL (ref 3.4–5)
ALP BLD-CCNC: 74 U/L (ref 40–129)
ALT SERPL-CCNC: 13 U/L (ref 10–40)
AMORPHOUS: PRESENT
ANION GAP SERPL CALCULATED.3IONS-SCNC: 8 MMOL/L (ref 3–16)
AST SERPL-CCNC: 17 U/L (ref 15–37)
BACTERIA: ABNORMAL /HPF
BASOPHILS ABSOLUTE: 0 K/UL (ref 0–0.2)
BASOPHILS RELATIVE PERCENT: 0.3 %
BILIRUB SERPL-MCNC: <0.2 MG/DL (ref 0–1)
BILIRUBIN URINE: NEGATIVE
BLOOD, URINE: NEGATIVE
BUN BLDV-MCNC: 11 MG/DL (ref 7–20)
CALCIUM OXALATE CRYSTALS: PRESENT
CALCIUM SERPL-MCNC: 9.5 MG/DL (ref 8.3–10.6)
CHLORIDE BLD-SCNC: 101 MMOL/L (ref 99–110)
CLARITY: ABNORMAL
CO2: 30 MMOL/L (ref 21–32)
COLOR: YELLOW
COMMENT UA: ABNORMAL
CREAT SERPL-MCNC: 0.7 MG/DL (ref 0.8–1.3)
EOSINOPHILS ABSOLUTE: 0.1 K/UL (ref 0–0.6)
EOSINOPHILS RELATIVE PERCENT: 0.6 %
EPITHELIAL CELLS, UA: 5 /HPF (ref 0–5)
GFR SERPL CREATININE-BSD FRML MDRD: >60 ML/MIN/{1.73_M2}
GLUCOSE BLD-MCNC: 188 MG/DL (ref 70–99)
GLUCOSE URINE: >=1000 MG/DL
HCT VFR BLD CALC: 39.7 % (ref 40.5–52.5)
HEMOGLOBIN: 12.4 G/DL (ref 13.5–17.5)
HYALINE CASTS: ABNORMAL /LPF (ref 0–2)
KETONES, URINE: ABNORMAL MG/DL
LACTIC ACID, SEPSIS: 1 MMOL/L (ref 0.4–1.9)
LACTIC ACID, SEPSIS: 1.1 MMOL/L (ref 0.4–1.9)
LEUKOCYTE ESTERASE, URINE: NEGATIVE
LIPASE: 30 U/L (ref 13–60)
LYMPHOCYTES ABSOLUTE: 3.3 K/UL (ref 1–5.1)
LYMPHOCYTES RELATIVE PERCENT: 34 %
MCH RBC QN AUTO: 24.2 PG (ref 26–34)
MCHC RBC AUTO-ENTMCNC: 31.3 G/DL (ref 31–36)
MCV RBC AUTO: 77.2 FL (ref 80–100)
MICROSCOPIC EXAMINATION: YES
MONOCYTES ABSOLUTE: 0.8 K/UL (ref 0–1.3)
MONOCYTES RELATIVE PERCENT: 8.1 %
MUCUS: PRESENT
NEUTROPHILS ABSOLUTE: 5.5 K/UL (ref 1.7–7.7)
NEUTROPHILS RELATIVE PERCENT: 57 %
NITRITE, URINE: NEGATIVE
PDW BLD-RTO: 16.3 % (ref 12.4–15.4)
PH UA: 5.5 (ref 5–8)
PLATELET # BLD: 234 K/UL (ref 135–450)
PMV BLD AUTO: 8.4 FL (ref 5–10.5)
POTASSIUM SERPL-SCNC: 4.1 MMOL/L (ref 3.5–5.1)
PROTEIN UA: 100 MG/DL
RBC # BLD: 5.14 M/UL (ref 4.2–5.9)
RBC UA: 7 /HPF (ref 0–4)
SODIUM BLD-SCNC: 139 MMOL/L (ref 136–145)
SPECIFIC GRAVITY UA: >=1.03 (ref 1–1.03)
TOTAL PROTEIN: 7 G/DL (ref 6.4–8.2)
URINE REFLEX TO CULTURE: ABNORMAL
URINE TYPE: ABNORMAL
UROBILINOGEN, URINE: 1 E.U./DL
WBC # BLD: 9.7 K/UL (ref 4–11)
WBC UA: 2 /HPF (ref 0–5)

## 2022-12-07 PROCEDURE — 96375 TX/PRO/DX INJ NEW DRUG ADDON: CPT

## 2022-12-07 PROCEDURE — 83690 ASSAY OF LIPASE: CPT

## 2022-12-07 PROCEDURE — 36415 COLL VENOUS BLD VENIPUNCTURE: CPT

## 2022-12-07 PROCEDURE — 6360000004 HC RX CONTRAST MEDICATION: Performed by: PHYSICIAN ASSISTANT

## 2022-12-07 PROCEDURE — 99285 EMERGENCY DEPT VISIT HI MDM: CPT

## 2022-12-07 PROCEDURE — 85025 COMPLETE CBC W/AUTO DIFF WBC: CPT

## 2022-12-07 PROCEDURE — 81001 URINALYSIS AUTO W/SCOPE: CPT

## 2022-12-07 PROCEDURE — 80053 COMPREHEN METABOLIC PANEL: CPT

## 2022-12-07 PROCEDURE — 96376 TX/PRO/DX INJ SAME DRUG ADON: CPT

## 2022-12-07 PROCEDURE — 6360000002 HC RX W HCPCS: Performed by: PHYSICIAN ASSISTANT

## 2022-12-07 PROCEDURE — 83605 ASSAY OF LACTIC ACID: CPT

## 2022-12-07 PROCEDURE — 96374 THER/PROPH/DIAG INJ IV PUSH: CPT

## 2022-12-07 PROCEDURE — 96361 HYDRATE IV INFUSION ADD-ON: CPT

## 2022-12-07 PROCEDURE — 2580000003 HC RX 258: Performed by: PHYSICIAN ASSISTANT

## 2022-12-07 PROCEDURE — 74177 CT ABD & PELVIS W/CONTRAST: CPT

## 2022-12-07 RX ORDER — MORPHINE SULFATE 4 MG/ML
4 INJECTION, SOLUTION INTRAMUSCULAR; INTRAVENOUS EVERY 30 MIN PRN
Status: DISCONTINUED | OUTPATIENT
Start: 2022-12-07 | End: 2022-12-08

## 2022-12-07 RX ORDER — SODIUM CHLORIDE, SODIUM LACTATE, POTASSIUM CHLORIDE, CALCIUM CHLORIDE 600; 310; 30; 20 MG/100ML; MG/100ML; MG/100ML; MG/100ML
1000 INJECTION, SOLUTION INTRAVENOUS ONCE
Status: COMPLETED | OUTPATIENT
Start: 2022-12-07 | End: 2022-12-07

## 2022-12-07 RX ORDER — ONDANSETRON 2 MG/ML
4 INJECTION INTRAMUSCULAR; INTRAVENOUS EVERY 6 HOURS PRN
Status: DISCONTINUED | OUTPATIENT
Start: 2022-12-07 | End: 2022-12-08

## 2022-12-07 RX ADMIN — MORPHINE SULFATE 4 MG: 4 INJECTION, SOLUTION INTRAMUSCULAR; INTRAVENOUS at 20:04

## 2022-12-07 RX ADMIN — MORPHINE SULFATE 4 MG: 4 INJECTION, SOLUTION INTRAMUSCULAR; INTRAVENOUS at 20:42

## 2022-12-07 RX ADMIN — SODIUM CHLORIDE, POTASSIUM CHLORIDE, SODIUM LACTATE AND CALCIUM CHLORIDE 1000 ML: 600; 310; 30; 20 INJECTION, SOLUTION INTRAVENOUS at 20:04

## 2022-12-07 RX ADMIN — IOPAMIDOL 75 ML: 755 INJECTION, SOLUTION INTRAVENOUS at 20:34

## 2022-12-07 RX ADMIN — ONDANSETRON 4 MG: 2 INJECTION INTRAMUSCULAR; INTRAVENOUS at 20:04

## 2022-12-07 ASSESSMENT — PAIN - FUNCTIONAL ASSESSMENT: PAIN_FUNCTIONAL_ASSESSMENT: 0-10

## 2022-12-07 ASSESSMENT — PAIN DESCRIPTION - PAIN TYPE: TYPE: ACUTE PAIN

## 2022-12-07 ASSESSMENT — ENCOUNTER SYMPTOMS
VOMITING: 0
RHINORRHEA: 0
DIARRHEA: 0
ABDOMINAL PAIN: 1
COUGH: 0
SHORTNESS OF BREATH: 0
NAUSEA: 1

## 2022-12-07 ASSESSMENT — PAIN SCALES - GENERAL
PAINLEVEL_OUTOF10: 8
PAINLEVEL_OUTOF10: 10
PAINLEVEL_OUTOF10: 5
PAINLEVEL_OUTOF10: 10

## 2022-12-07 ASSESSMENT — PAIN DESCRIPTION - LOCATION
LOCATION: ABDOMEN

## 2022-12-07 ASSESSMENT — LIFESTYLE VARIABLES: HOW OFTEN DO YOU HAVE A DRINK CONTAINING ALCOHOL: NEVER

## 2022-12-07 ASSESSMENT — PAIN DESCRIPTION - DESCRIPTORS: DESCRIPTORS: SHARP

## 2022-12-07 NOTE — TELEPHONE ENCOUNTER
Pt was d/c from [facility] yesterday  Pt is having worse symptoms today and wants to know if he should go to the ER  He didn't think he could wait until Friday  Please Advise

## 2022-12-07 NOTE — ED PROVIDER NOTES
[address]        Pt Name: Ahmed Severance  MRN: [id number]  [place] 1960  Date of evaluation: 12/7/2022  Provider: Дмитрий Wright PA-C  PCP: Ignacio Castrejon DO, DO  Note Started: 6:57 PM EST 12/7/22          SAMANTHA. I have evaluated this patient. My supervising physician was available for consultation. CHIEF COMPLAINT       Chief Complaint   Patient presents with    Abdominal Pain     Arrived per family d/t abd pain; discharged from [facility] yesterday for same symptom       HISTORY OF PRESENT ILLNESS   (Location, Timing/Onset, Context/Setting, Quality, Duration, Modifying Factors, Severity, Associated Signs and Symptoms)  Note limiting factors. Chief Complaint: Abdominal pain, and nausea    Ahmed Severance is a 60 y.o. male who presents to the emergency department today for evaluation for abdominal pain, and nausea. The patient was recently admitted on 12/1/2022, he was admitted for a small bowel obstruction. Patient did receive an NG tube, and his symptoms improved. He did not require surgery. He states that he was actually discharged yesterday. He states that he was feeling well when he was discharged, and then today after eating lunch, noted sharp pain to the lower abdomen, and he states that his pain today is actually worse than it was when he came in initially several days ago. The patient states that his pain is throughout the lower abdomen, is rated as a 10/10, is constant, he otherwise denies any known alleviating or any factors. He is nauseous has not had any vomiting. No diarrhea. He states he is passing minimal gas. Patient has no chest pain. He is no shortness of breath. He has no dysuria or hematuria. He has a possible history of a hemicolectomy. He otherwise has no other complaints. Nursing Notes were all reviewed and agreed with or any disagreements were addressed in the HPI.     REVIEW OF SYSTEMS (2-9 systems for level 4, 10 or more for level 5)     Review of Systems   Constitutional:  Negative for activity change, appetite change, chills and fever. HENT:  Negative for congestion and rhinorrhea. Respiratory:  Negative for cough and shortness of breath. Cardiovascular:  Negative for chest pain. Gastrointestinal:  Positive for abdominal pain and nausea. Negative for diarrhea and vomiting. Genitourinary:  Negative for difficulty urinating, dysuria and hematuria. Positives and Pertinent negatives as per HPI. Except as noted above in the ROS, all other systems were reviewed and negative. PAST MEDICAL HISTORY     Past Medical History:   Diagnosis Date    DM (diabetes mellitus) ([facility] [address]     HLD (hyperlipidemia)     Tubulovillous adenoma of colon          SURGICAL HISTORY     Past Surgical History:   Procedure Laterality Date    HEMICOLECTOMY           CURRENTMEDICATIONS       Previous Medications    ACETAMINOPHEN (TYLENOL) 325 MG TABLET    Take 975 mg by mouth 3 times daily    ASPIRIN 81 MG EC TABLET    Take 81 mg by mouth daily    ATORVASTATIN (LIPITOR) 40 MG TABLET    Take 40 mg by mouth daily    BLOOD GLUCOSE MONITORING SUPPL (ASSURE PRO BLOOD GLUCOSE METER) NUVIA    Use as instructed    CYANOCOBALAMIN 1000 MCG TABLET    Take 1,000 mcg by mouth daily    FAMOTIDINE (PEPCID) 20 MG TABLET    Take 20 mg by mouth 2 times daily    JARDIANCE 10 MG TABLET    TAKE 1 TABLET BY MOUTH EVERY DAY    MELOXICAM (MOBIC) 7.5 MG TABLET    Take 7.5 mg by mouth daily    METFORMIN (GLUCOPHAGE) 1000 MG TABLET    Take 1,000 mg by mouth 2 times daily (with meals)    SILDENAFIL (VIAGRA) 25 MG TABLET    Take 25 mg by mouth as needed    SITAGLIPTIN (JANUVIA) 50 MG TABLET    Take 50 mg by mouth daily    TRULICITY 8.67 VO/1.5LL SOPN    USE 0.75 MG ONCE A WEEK FOR 30 DAYS. TRULICITY 1.5 VM/4.3BK SC INJECTION    USE 1.5 MG ONCE A WEEK FOR 84 DAYS. ALLERGIES     Patient has no known allergies.     FAMILYHISTORY Family History   Problem Relation Age of Onset    Diabetes Mother     Cancer Mother           SOCIAL HISTORY       Social History     Tobacco Use    Smoking status: Never    Smokeless tobacco: Never   Substance Use Topics    Alcohol use: Yes     Comment: occasional beer    Drug use: Never       SCREENINGS    [place] Coma Scale  Eye Opening: Spontaneous  Best Verbal Response: Oriented  Best Motor Response: Obeys commands  [place] Coma Scale Score: 15        PHYSICAL EXAM    (up to 7 for level 4, 8 or more for level 5)     ED Triage Vitals [12/07/22 1802]   BP Temp Temp Source Heart Rate Resp SpO2 Height Weight   136/88 98.4 °F (36.9 °C) Oral 83 18 97 % 5' 11\" (1.803 m) 190 lb (86.2 kg)       Physical Exam  Vitals and nursing note reviewed. Constitutional:       Appearance: He is well-developed. He is not diaphoretic. Comments: Appears to be uncomfortable although is in no acute distress   HENT:      Head: Normocephalic and atraumatic. Right Ear: External ear normal.      Left Ear: External ear normal.      Nose: Nose normal.   Eyes:      General:         Right eye: No discharge. Left eye: No discharge. Neck:      Trachea: No tracheal deviation. Cardiovascular:      Rate and Rhythm: Normal rate and regular rhythm. Pulmonary:      Effort: Pulmonary effort is normal. No respiratory distress. Breath sounds: Normal breath sounds. No wheezing or rales. Abdominal:      General: Bowel sounds are decreased. Tenderness: There is generalized abdominal tenderness. Musculoskeletal:         General: Normal range of motion. Cervical back: Normal range of motion and neck supple. Skin:     General: Skin is warm and dry. Neurological:      Mental Status: He is alert and oriented to person, place, and time.    Psychiatric:         Behavior: Behavior normal.       DIAGNOSTIC RESULTS   LABS:    Labs Reviewed   CBC WITH AUTO DIFFERENTIAL - Abnormal; Notable for the following components: Result Value    Hemoglobin 12.4 (*)     Hematocrit 39.7 (*)     MCV 77.2 (*)     MCH 24.2 (*)     RDW 16.3 (*)     All other components within normal limits   COMPREHENSIVE METABOLIC PANEL - Abnormal; Notable for the following components:    Glucose 188 (*)     Creatinine 0.7 (*)     All other components within normal limits   URINALYSIS WITH REFLEX TO CULTURE - Abnormal; Notable for the following components:    Clarity, UA CLOUDY (*)     Glucose, Ur >=1000 (*)     Ketones, Urine TRACE (*)     Protein,  (*)     All other components within normal limits   MICROSCOPIC URINALYSIS - Abnormal; Notable for the following components:    RBC, UA 7 (*)     Calcium Oxalate Crystals Present (*)     Amorphous, UA Present (*)     Mucus, UA Present (*)     All other components within normal limits   LIPASE   LACTATE, SEPSIS   LACTATE, SEPSIS       When ordered only abnormal lab results are displayed. All other labs were within normal range or not returned as of this dictation. EKG: When ordered, EKG's are interpreted by the Emergency Department Physician in the absence of a cardiologist.  Please see their note for interpretation of EKG. RADIOLOGY:   Non-plain film images such as CT, Ultrasound and MRI are read by the radiologist. Plain radiographic images are visualized and preliminarily interpreted by the ED Provider with the below findings:        Interpretation per the Radiologist below, if available at the time of this note:    XR ABDOMEN FOR NG/OG/NE TUBE PLACEMENT   Final Result   The enteric catheter tip is coiled upon itself in the distal thoracic   esophagus with the tip directed cephalad. CT ABDOMEN PELVIS W IV CONTRAST Additional Contrast? None   Final Result   1.  High-grade small bowel obstruction with a focal transition point seen in   the mid abdomen as described above, with a mild degree of mesenteric swirling   seen on coronal and sagittal imaging, which can be associated with closed loop obstruction as well as possible internal volvulus. 2. Small hiatal hernia with fluid in the distal thoracic esophagus suggestive   of reflux. 3. Small volume of reactive ascites. 4. Dependent subsegmental atelectatic changes. CT ABDOMEN PELVIS WO CONTRAST Additional Contrast? None    Result Date: 12/3/2022  EXAMINATION: CT OF THE ABDOMEN AND PELVIS WITHOUT CONTRAST 12/1/2022 9:37 pm TECHNIQUE: CT of the abdomen and pelvis was performed without the administration of intravenous contrast. Multiplanar reformatted images are provided for review. Automated exposure control, iterative reconstruction, and/or weight based adjustment of the mA/kV was utilized to reduce the radiation dose to as low as reasonably achievable. COMPARISON: None. HISTORY: ORDERING SYSTEM PROVIDED HISTORY: abd pain TECHNOLOGIST PROVIDED HISTORY: Reason for exam:->abd pain Additional Contrast?->None Decision Support Exception - unselect if not a suspected or confirmed emergency medical condition->Emergency Medical Condition (MA) Reason for Exam: Abd pain. Abdominal Pain (Pt reports sharp abdominal pain onset 4pm today. Reports n/v/d for a few days. Hx DM, wife states pt started trulicity approx 1 month ago and unsure if this is a side effect. States his blood glucose was 130 at home today. ). FINDINGS: Lower Chest:  Visualized portion of the lower chest demonstrates no acute abnormality. There are coronary artery calcifications. Organs: The solid organs are incompletely evaluated without intravenous contrast.  The unenhanced liver, spleen, pancreas, kidneys and adrenal glands are without acute findings. Nonobstructing right nephrolithiasis. A few simple appearing renal cysts are noted. The gallbladder is present without radiopaque cholelithiasis. GI/Bowel: Limited evaluation of the bowel without intravenous contrast. There are multiple dilated loops of small bowel consistent with an acute small bowel obstruction.   The bowel is somewhat difficult to follow. There appears to be a high-grade transition point in the midline low abdomen anterior to the iliac bifurcation. The distal small bowel is decompressed. Normal caliber of the colon. Diverticulosis without evidence of acute diverticulitis. Status post right hemicolectomy with an unremarkable appearance of the ileocolic anastomosis. Pelvis: The urinary bladder is partially distended without contour abnormality. The prostate and seminal vesicles are not well seen secondary to metallic streak artifact from a left total hip arthroplasty. Mildly enlarged bilateral inguinal lymph nodes which measure up to 1.4 cm in short axis. No definite intrapelvic adenopathy within the limitations of streak artifact. Peritoneum/Retroperitoneum: No ascites or pneumoperitoneum. Mild calcified atherosclerotic plaque. No evidence of lymphadenopathy. The aorta and IVC are normal in caliber. Bones/Soft Tissues: No acute bony or soft tissue abnormality. 1. Acute small bowel obstruction with a high grade transition point in the midline low abdomen anterior to the iliac bifurcation. 2. Status post right hemicolectomy. 3. Nonobstructing right nephrolithiasis. 4. Mildly enlarged bilateral inguinal lymph nodes, nonspecific. XR CHEST PORTABLE    Result Date: 12/2/2022  EXAMINATION: ONE XRAY VIEW OF THE CHEST 12/2/2022 12:02 am COMPARISON: None. HISTORY: ORDERING SYSTEM PROVIDED HISTORY: NG placement TECHNOLOGIST PROVIDED HISTORY: Reason for exam:->NG placement Reason for Exam:  NG placement FINDINGS: There is a nasogastric tube in place with the tip just within the stomach. The gas pattern is unremarkable. Gastric tube tip probably just within the stomach. Recommend readjusting the tube tip into the distal stomach for more physiologic positioning. Nonspecific gas pattern.      FL SMALL BOWEL FOLLOW THROUGH ONLY    Result Date: 12/5/2022  EXAMINATION: SMALL BOWEL FOLLOW THROUGH SERIES 12/4/2022 TECHNIQUE: Small bowel follow through series was performed with overhead images and spot images. FLUOROSCOPY DOSE AND TYPE OR TIME AND EXPOSURES: 8 images were obtained. COMPARISON: None HISTORY: ORDERING SYSTEM PROVIDED HISTORY: sbo TECHNOLOGIST PROVIDED HISTORY: Reason for exam:->sbo Reason for Exam: sbo FINDINGS:  image of the abdomen demonstrates mildly distended small bowel. Stool noted in the colon. Moderate lumbar degenerative changes identified. Left hip arthroplasty. Severe arthrosis right hip. The stomach and duodenum are normal.  Normal caliber and appearance of the jejunum. Moderately dilated ileum identified measuring up to 4.6 cm. Small bowel transit time is delayed. Contrast is noted in the colon after 4 hours. The colon is not dilated. Findings are highly suggestive of partial small bowel obstruction, with delayed small bowel transit time. XR ABDOMEN (2 VIEWS)    Result Date: 12/5/2022  EXAMINATION: TWO XRAY VIEWS OF THE ABDOMEN 12/5/2022 9:47 am COMPARISON: 12/04/2022 HISTORY: ORDERING SYSTEM PROVIDED HISTORY: SBO TECHNOLOGIST PROVIDED HISTORY: Reason for exam:->SBO Reason for Exam: SBO FINDINGS: Contrast is seen throughout the length of the colon. Air-fluid levels are seen on the upright view. Some contrast is seen within small bowel though the degree of small-bowel contrast has decreased as compared to prior. Small bowel loops are dilated. Persistent though decreased contrast within dilated small bowel as compared to prior with increased contrast in the colon; the findings can be in keeping with partial small bowel obstruction. XR ABDOMEN (2 VIEWS)    Result Date: 12/3/2022  EXAMINATION: TWO XRAY VIEWS OF THE ABDOMEN 12/3/2022 5:08 pm COMPARISON: CT abdomen and pelvis 12/01/2022.  HISTORY: ORDERING SYSTEM PROVIDED HISTORY: SBO TECHNOLOGIST PROVIDED HISTORY: Reason for exam:->SBO Reason for Exam: SBO FINDINGS: The tip of the enteric tube is in the gastric fundus. The side port is at the level of the gastroesophageal junction. Air-filled distended loops of small bowel are seen in the left abdomen with air-fluid levels on the upright view. No evidence of pneumoperitoneum. No acute osseous abnormality. 1. Air-filled distended loops of small bowel in the left abdomen with air-fluid levels on the upright view compatible with an obstruction. 2. Tip of the enteric tube in the gastric fundus and side-port at the level of the gastroesophageal junction. Consider advancement.            PROCEDURES   Unless otherwise noted below, none     Procedures    CRITICAL CARE TIME       CONSULTS:  IP CONSULT TO GENERAL SURGERY      EMERGENCY DEPARTMENT COURSE and DIFFERENTIAL DIAGNOSIS/MDM:   Vitals:    Vitals:    12/07/22 2328 12/08/22 0026 12/08/22 0102 12/08/22 0103   BP:  120/87 128/85    Pulse:    73   Resp:    16   Temp:       TempSrc:       SpO2: 97%   97%   Weight:       Height:           Patient was given the following medications:  Medications   famotidine (PEPCID) 20 mg in sodium chloride (PF) 0.9 % 10 mL injection (20 mg IntraVENous Given 12/8/22 0108)   insulin lispro (HUMALOG) injection vial 0-4 Units (has no administration in time range)   insulin lispro (HUMALOG) injection vial 0-4 Units (has no administration in time range)   dextrose bolus 10% 125 mL (has no administration in time range)     Or   dextrose bolus 10% 250 mL (has no administration in time range)   glucagon (rDNA) injection 1 mg (has no administration in time range)   dextrose 10 % infusion (has no administration in time range)   sodium chloride flush 0.9 % injection 5-40 mL (has no administration in time range)   sodium chloride flush 0.9 % injection 5-40 mL (has no administration in time range)   0.9 % sodium chloride infusion (has no administration in time range)   enoxaparin (LOVENOX) injection 40 mg (has no administration in time range)   ondansetron (ZOFRAN-ODT) disintegrating tablet 4 mg (has no administration in time range)     Or   ondansetron (ZOFRAN) injection 4 mg (has no administration in time range)   polyethylene glycol (GLYCOLAX) packet 17 g (has no administration in time range)   acetaminophen (TYLENOL) tablet 650 mg (has no administration in time range)     Or   acetaminophen (TYLENOL) suppository 650 mg (has no administration in time range)   lactated ringers infusion ( IntraVENous New Bag 12/8/22 0108)   morphine (PF) injection 2 mg (has no administration in time range)     Or   morphine injection 4 mg (has no administration in time range)   lactated ringers infusion 1,000 mL (0 mLs IntraVENous Stopped 12/7/22 2133)   iopamidol (ISOVUE-370) 76 % injection 75 mL (75 mLs IntraVENous Given 12/7/22 2034)         Is this patient to be included in the SEP-1 Core Measure due to severe sepsis or septic shock? No   Exclusion criteria - the patient is NOT to be included for SEP-1 Core Measure due to: Infection is not suspected    Briefly, this is a 56-year-old male who presents to the emergency department today for evaluation for abdominal pain. Patient was actually seen on 12/1/2022 for similar symptoms was admitted for an SBO at that time, and otherwise did not require surgery, did resolve after an NG tube. Patient was discharged yesterday, and after eating lunch today had worsening pain with associated nausea. On examination, the patient appears to be uncomfortable although is in no acute distress. He has decreased bowel sounds with generalized abdominal discomfort. Urine shows no evidence of infection. His lactic acid is normal.  CBC shows no evidence of leukocytosis, mild anemia. CMP is unremarkable. Lipase is normal.    CT does show a high-grade small bowel obstruction with a focal transition point seen in the mid abdomen. There is a mild degree of mesenteric swirling which can be associated with a closed-loop obstruction.   Patient was given fluids as well as morphine and Zofran in the ED, upon evaluation, he states that he is actually feeling much better, I do not suspect a closed-loop obstruction. I did discuss this with general surgery who is agreeable with our plan of NG tube, and hospitalist admission. Hospitalist has been paged for admission, patient is updated, agreeable with plan, and stable for admission    FINAL IMPRESSION      1. SBO (small bowel obstruction) ([facility] [address]          DISPOSITION/PLAN   DISPOSITION Admitted 12/08/2022 12:30:05 AM      PATIENT REFERRED TO:  No follow-up provider specified.     DISCHARGE MEDICATIONS:  New Prescriptions    No medications on file       DISCONTINUED MEDICATIONS:  Discontinued Medications    No medications on file              (Please note that portions of this note were completed with a voice recognition program.  Efforts were made to edit the dictations but occasionally words are mis-transcribed.)    Howard Watson PA-C (electronically signed)            Howard Watson PA-C  12/08/22 0128

## 2022-12-08 ENCOUNTER — ANESTHESIA (OUTPATIENT)
Dept: OPERATING ROOM | Age: 62
End: 2022-12-08
Payer: COMMERCIAL

## 2022-12-08 ENCOUNTER — APPOINTMENT (OUTPATIENT)
Dept: GENERAL RADIOLOGY | Age: 62
End: 2022-12-08
Payer: COMMERCIAL

## 2022-12-08 ENCOUNTER — ANESTHESIA EVENT (OUTPATIENT)
Dept: OPERATING ROOM | Age: 62
End: 2022-12-08
Payer: COMMERCIAL

## 2022-12-08 LAB
GLUCOSE BLD-MCNC: 105 MG/DL (ref 70–99)
GLUCOSE BLD-MCNC: 128 MG/DL (ref 70–99)
GLUCOSE BLD-MCNC: 200 MG/DL (ref 70–99)
PERFORMED ON: ABNORMAL

## 2022-12-08 PROCEDURE — 2580000003 HC RX 258: Performed by: NURSE PRACTITIONER

## 2022-12-08 PROCEDURE — 0DBK0ZZ EXCISION OF ASCENDING COLON, OPEN APPROACH: ICD-10-PCS | Performed by: SURGERY

## 2022-12-08 PROCEDURE — 44160 REMOVAL OF COLON: CPT | Performed by: SURGERY

## 2022-12-08 PROCEDURE — 6360000002 HC RX W HCPCS: Performed by: ANESTHESIOLOGY

## 2022-12-08 PROCEDURE — 6360000002 HC RX W HCPCS: Performed by: SURGERY

## 2022-12-08 PROCEDURE — 2500000003 HC RX 250 WO HCPCS: Performed by: NURSE ANESTHETIST, CERTIFIED REGISTERED

## 2022-12-08 PROCEDURE — 0DB80ZZ EXCISION OF SMALL INTESTINE, OPEN APPROACH: ICD-10-PCS | Performed by: SURGERY

## 2022-12-08 PROCEDURE — 2500000003 HC RX 250 WO HCPCS: Performed by: ANESTHESIOLOGY

## 2022-12-08 PROCEDURE — 3700000000 HC ANESTHESIA ATTENDED CARE: Performed by: SURGERY

## 2022-12-08 PROCEDURE — 1200000000 HC SEMI PRIVATE

## 2022-12-08 PROCEDURE — 3600000014 HC SURGERY LEVEL 4 ADDTL 15MIN: Performed by: SURGERY

## 2022-12-08 PROCEDURE — 2500000003 HC RX 250 WO HCPCS: Performed by: SURGERY

## 2022-12-08 PROCEDURE — 6360000002 HC RX W HCPCS: Performed by: NURSE ANESTHETIST, CERTIFIED REGISTERED

## 2022-12-08 PROCEDURE — 74018 RADEX ABDOMEN 1 VIEW: CPT

## 2022-12-08 PROCEDURE — 7100000001 HC PACU RECOVERY - ADDTL 15 MIN: Performed by: SURGERY

## 2022-12-08 PROCEDURE — 6360000002 HC RX W HCPCS: Performed by: NURSE PRACTITIONER

## 2022-12-08 PROCEDURE — 2580000003 HC RX 258: Performed by: SURGERY

## 2022-12-08 PROCEDURE — P9045 ALBUMIN (HUMAN), 5%, 250 ML: HCPCS | Performed by: NURSE ANESTHETIST, CERTIFIED REGISTERED

## 2022-12-08 PROCEDURE — 2720000010 HC SURG SUPPLY STERILE: Performed by: SURGERY

## 2022-12-08 PROCEDURE — 0DNW0ZZ RELEASE PERITONEUM, OPEN APPROACH: ICD-10-PCS | Performed by: SURGERY

## 2022-12-08 PROCEDURE — A4216 STERILE WATER/SALINE, 10 ML: HCPCS | Performed by: SURGERY

## 2022-12-08 PROCEDURE — 2580000003 HC RX 258: Performed by: ANESTHESIOLOGY

## 2022-12-08 PROCEDURE — 7100000000 HC PACU RECOVERY - FIRST 15 MIN: Performed by: SURGERY

## 2022-12-08 PROCEDURE — 2709999900 HC NON-CHARGEABLE SUPPLY: Performed by: SURGERY

## 2022-12-08 PROCEDURE — 3600000004 HC SURGERY LEVEL 4 BASE: Performed by: SURGERY

## 2022-12-08 PROCEDURE — 3700000001 HC ADD 15 MINUTES (ANESTHESIA): Performed by: SURGERY

## 2022-12-08 PROCEDURE — 2500000003 HC RX 250 WO HCPCS: Performed by: NURSE PRACTITIONER

## 2022-12-08 PROCEDURE — 99222 1ST HOSP IP/OBS MODERATE 55: CPT | Performed by: SURGERY

## 2022-12-08 PROCEDURE — 88307 TISSUE EXAM BY PATHOLOGIST: CPT

## 2022-12-08 PROCEDURE — A4216 STERILE WATER/SALINE, 10 ML: HCPCS | Performed by: NURSE PRACTITIONER

## 2022-12-08 RX ORDER — ONDANSETRON 2 MG/ML
INJECTION INTRAMUSCULAR; INTRAVENOUS PRN
Status: DISCONTINUED | OUTPATIENT
Start: 2022-12-08 | End: 2022-12-08 | Stop reason: SDUPTHER

## 2022-12-08 RX ORDER — MORPHINE SULFATE 2 MG/ML
2 INJECTION, SOLUTION INTRAMUSCULAR; INTRAVENOUS
Status: DISCONTINUED | OUTPATIENT
Start: 2022-12-08 | End: 2022-12-09

## 2022-12-08 RX ORDER — ONDANSETRON 4 MG/1
4 TABLET, ORALLY DISINTEGRATING ORAL EVERY 8 HOURS PRN
Status: DISCONTINUED | OUTPATIENT
Start: 2022-12-08 | End: 2022-12-08 | Stop reason: SDUPTHER

## 2022-12-08 RX ORDER — SODIUM CHLORIDE 9 MG/ML
INJECTION, SOLUTION INTRAVENOUS CONTINUOUS
Status: DISCONTINUED | OUTPATIENT
Start: 2022-12-08 | End: 2022-12-11

## 2022-12-08 RX ORDER — HYDROMORPHONE HCL 110MG/55ML
0.5 PATIENT CONTROLLED ANALGESIA SYRINGE INTRAVENOUS EVERY 5 MIN PRN
Status: DISCONTINUED | OUTPATIENT
Start: 2022-12-08 | End: 2022-12-08

## 2022-12-08 RX ORDER — LIDOCAINE HYDROCHLORIDE 10 MG/ML
1 INJECTION, SOLUTION EPIDURAL; INFILTRATION; INTRACAUDAL; PERINEURAL
Status: CANCELLED | OUTPATIENT
Start: 2022-12-08 | End: 2022-12-09

## 2022-12-08 RX ORDER — MORPHINE SULFATE 4 MG/ML
4 INJECTION, SOLUTION INTRAMUSCULAR; INTRAVENOUS
Status: DISCONTINUED | OUTPATIENT
Start: 2022-12-08 | End: 2022-12-09

## 2022-12-08 RX ORDER — SODIUM CHLORIDE 0.9 % (FLUSH) 0.9 %
5-40 SYRINGE (ML) INJECTION EVERY 12 HOURS SCHEDULED
Status: DISCONTINUED | OUTPATIENT
Start: 2022-12-08 | End: 2022-12-17 | Stop reason: HOSPADM

## 2022-12-08 RX ORDER — DEXTROSE MONOHYDRATE 100 MG/ML
INJECTION, SOLUTION INTRAVENOUS CONTINUOUS PRN
Status: DISCONTINUED | OUTPATIENT
Start: 2022-12-08 | End: 2022-12-17 | Stop reason: HOSPADM

## 2022-12-08 RX ORDER — PROCHLORPERAZINE EDISYLATE 5 MG/ML
5 INJECTION INTRAMUSCULAR; INTRAVENOUS
Status: DISCONTINUED | OUTPATIENT
Start: 2022-12-08 | End: 2022-12-08

## 2022-12-08 RX ORDER — ROCURONIUM BROMIDE 10 MG/ML
INJECTION, SOLUTION INTRAVENOUS PRN
Status: DISCONTINUED | OUTPATIENT
Start: 2022-12-08 | End: 2022-12-08 | Stop reason: SDUPTHER

## 2022-12-08 RX ORDER — ONDANSETRON 2 MG/ML
4 INJECTION INTRAMUSCULAR; INTRAVENOUS EVERY 6 HOURS PRN
Status: DISCONTINUED | OUTPATIENT
Start: 2022-12-08 | End: 2022-12-08 | Stop reason: SDUPTHER

## 2022-12-08 RX ORDER — PROPOFOL 10 MG/ML
INJECTION, EMULSION INTRAVENOUS PRN
Status: DISCONTINUED | OUTPATIENT
Start: 2022-12-08 | End: 2022-12-08 | Stop reason: SDUPTHER

## 2022-12-08 RX ORDER — ONDANSETRON 2 MG/ML
4 INJECTION INTRAMUSCULAR; INTRAVENOUS
Status: DISCONTINUED | OUTPATIENT
Start: 2022-12-08 | End: 2022-12-08

## 2022-12-08 RX ORDER — MORPHINE SULFATE 4 MG/ML
4 INJECTION, SOLUTION INTRAMUSCULAR; INTRAVENOUS
Status: DISCONTINUED | OUTPATIENT
Start: 2022-12-08 | End: 2022-12-08 | Stop reason: SDUPTHER

## 2022-12-08 RX ORDER — SODIUM CHLORIDE 9 MG/ML
INJECTION, SOLUTION INTRAVENOUS CONTINUOUS PRN
Status: DISCONTINUED | OUTPATIENT
Start: 2022-12-08 | End: 2022-12-08 | Stop reason: SDUPTHER

## 2022-12-08 RX ORDER — FENTANYL CITRATE 50 UG/ML
25 INJECTION, SOLUTION INTRAMUSCULAR; INTRAVENOUS EVERY 5 MIN PRN
Status: DISCONTINUED | OUTPATIENT
Start: 2022-12-08 | End: 2022-12-08

## 2022-12-08 RX ORDER — OXYCODONE HYDROCHLORIDE 5 MG/1
5 TABLET ORAL EVERY 4 HOURS PRN
Status: DISCONTINUED | OUTPATIENT
Start: 2022-12-08 | End: 2022-12-17 | Stop reason: HOSPADM

## 2022-12-08 RX ORDER — MIDAZOLAM HYDROCHLORIDE 1 MG/ML
INJECTION INTRAMUSCULAR; INTRAVENOUS PRN
Status: DISCONTINUED | OUTPATIENT
Start: 2022-12-08 | End: 2022-12-08 | Stop reason: SDUPTHER

## 2022-12-08 RX ORDER — FAMOTIDINE 10 MG/ML
INJECTION, SOLUTION INTRAVENOUS PRN
Status: DISCONTINUED | OUTPATIENT
Start: 2022-12-08 | End: 2022-12-08 | Stop reason: SDUPTHER

## 2022-12-08 RX ORDER — GLYCOPYRROLATE 0.2 MG/ML
INJECTION INTRAMUSCULAR; INTRAVENOUS PRN
Status: DISCONTINUED | OUTPATIENT
Start: 2022-12-08 | End: 2022-12-08 | Stop reason: SDUPTHER

## 2022-12-08 RX ORDER — ONDANSETRON 4 MG/1
4 TABLET, ORALLY DISINTEGRATING ORAL EVERY 8 HOURS PRN
Status: DISCONTINUED | OUTPATIENT
Start: 2022-12-08 | End: 2022-12-17 | Stop reason: HOSPADM

## 2022-12-08 RX ORDER — SODIUM CHLORIDE, SODIUM LACTATE, POTASSIUM CHLORIDE, CALCIUM CHLORIDE 600; 310; 30; 20 MG/100ML; MG/100ML; MG/100ML; MG/100ML
INJECTION, SOLUTION INTRAVENOUS CONTINUOUS
Status: DISCONTINUED | OUTPATIENT
Start: 2022-12-08 | End: 2022-12-09 | Stop reason: SDUPTHER

## 2022-12-08 RX ORDER — ALBUMIN, HUMAN INJ 5% 5 %
SOLUTION INTRAVENOUS PRN
Status: DISCONTINUED | OUTPATIENT
Start: 2022-12-08 | End: 2022-12-08 | Stop reason: SDUPTHER

## 2022-12-08 RX ORDER — FERROUS SULFATE 325(65) MG
325 TABLET ORAL 2 TIMES DAILY WITH MEALS
COMMUNITY
Start: 2019-04-30

## 2022-12-08 RX ORDER — KETAMINE HYDROCHLORIDE 10 MG/ML
INJECTION, SOLUTION INTRAMUSCULAR; INTRAVENOUS PRN
Status: DISCONTINUED | OUTPATIENT
Start: 2022-12-08 | End: 2022-12-08 | Stop reason: SDUPTHER

## 2022-12-08 RX ORDER — MAGNESIUM HYDROXIDE 1200 MG/15ML
LIQUID ORAL CONTINUOUS PRN
Status: COMPLETED | OUTPATIENT
Start: 2022-12-08 | End: 2022-12-08

## 2022-12-08 RX ORDER — ENOXAPARIN SODIUM 100 MG/ML
40 INJECTION SUBCUTANEOUS DAILY
Status: DISCONTINUED | OUTPATIENT
Start: 2022-12-09 | End: 2022-12-17 | Stop reason: HOSPADM

## 2022-12-08 RX ORDER — ACETAMINOPHEN 325 MG/1
650 TABLET ORAL EVERY 4 HOURS PRN
Status: DISCONTINUED | OUTPATIENT
Start: 2022-12-08 | End: 2022-12-09 | Stop reason: SDUPTHER

## 2022-12-08 RX ORDER — SODIUM CHLORIDE 9 MG/ML
INJECTION, SOLUTION INTRAVENOUS PRN
Status: DISCONTINUED | OUTPATIENT
Start: 2022-12-08 | End: 2022-12-17 | Stop reason: HOSPADM

## 2022-12-08 RX ORDER — SUCCINYLCHOLINE CHLORIDE 20 MG/ML
INJECTION INTRAMUSCULAR; INTRAVENOUS PRN
Status: DISCONTINUED | OUTPATIENT
Start: 2022-12-08 | End: 2022-12-08 | Stop reason: SDUPTHER

## 2022-12-08 RX ORDER — ENOXAPARIN SODIUM 100 MG/ML
40 INJECTION SUBCUTANEOUS DAILY
Status: DISCONTINUED | OUTPATIENT
Start: 2022-12-09 | End: 2022-12-08 | Stop reason: SDUPTHER

## 2022-12-08 RX ORDER — ACETAMINOPHEN 325 MG/1
650 TABLET ORAL EVERY 6 HOURS PRN
Status: DISCONTINUED | OUTPATIENT
Start: 2022-12-08 | End: 2022-12-17 | Stop reason: HOSPADM

## 2022-12-08 RX ORDER — OXYCODONE HYDROCHLORIDE 5 MG/1
10 TABLET ORAL EVERY 4 HOURS PRN
Status: DISCONTINUED | OUTPATIENT
Start: 2022-12-08 | End: 2022-12-17 | Stop reason: HOSPADM

## 2022-12-08 RX ORDER — SODIUM CHLORIDE 0.9 % (FLUSH) 0.9 %
5-40 SYRINGE (ML) INJECTION EVERY 12 HOURS SCHEDULED
Status: DISCONTINUED | OUTPATIENT
Start: 2022-12-08 | End: 2022-12-08 | Stop reason: SDUPTHER

## 2022-12-08 RX ORDER — ACETAMINOPHEN 650 MG/1
650 SUPPOSITORY RECTAL EVERY 6 HOURS PRN
Status: DISCONTINUED | OUTPATIENT
Start: 2022-12-08 | End: 2022-12-17 | Stop reason: HOSPADM

## 2022-12-08 RX ORDER — SODIUM CHLORIDE 0.9 % (FLUSH) 0.9 %
5-40 SYRINGE (ML) INJECTION PRN
Status: DISCONTINUED | OUTPATIENT
Start: 2022-12-08 | End: 2022-12-17 | Stop reason: HOSPADM

## 2022-12-08 RX ORDER — POLYETHYLENE GLYCOL 3350 17 G/17G
17 POWDER, FOR SOLUTION ORAL DAILY PRN
Status: DISCONTINUED | OUTPATIENT
Start: 2022-12-08 | End: 2022-12-17 | Stop reason: HOSPADM

## 2022-12-08 RX ORDER — LIDOCAINE HYDROCHLORIDE 20 MG/ML
INJECTION, SOLUTION INFILTRATION; PERINEURAL PRN
Status: DISCONTINUED | OUTPATIENT
Start: 2022-12-08 | End: 2022-12-08 | Stop reason: SDUPTHER

## 2022-12-08 RX ORDER — INSULIN LISPRO 100 [IU]/ML
0-4 INJECTION, SOLUTION INTRAVENOUS; SUBCUTANEOUS
Status: DISCONTINUED | OUTPATIENT
Start: 2022-12-08 | End: 2022-12-17 | Stop reason: HOSPADM

## 2022-12-08 RX ORDER — BISACODYL 10 MG
10 SUPPOSITORY, RECTAL RECTAL DAILY PRN
Status: DISCONTINUED | OUTPATIENT
Start: 2022-12-08 | End: 2022-12-17 | Stop reason: HOSPADM

## 2022-12-08 RX ORDER — HYDRALAZINE HYDROCHLORIDE 20 MG/ML
10 INJECTION INTRAMUSCULAR; INTRAVENOUS
Status: DISCONTINUED | OUTPATIENT
Start: 2022-12-08 | End: 2022-12-08

## 2022-12-08 RX ORDER — INSULIN LISPRO 100 [IU]/ML
0-4 INJECTION, SOLUTION INTRAVENOUS; SUBCUTANEOUS NIGHTLY
Status: DISCONTINUED | OUTPATIENT
Start: 2022-12-08 | End: 2022-12-17 | Stop reason: HOSPADM

## 2022-12-08 RX ORDER — LISINOPRIL 40 MG/1
40 TABLET ORAL DAILY
COMMUNITY
Start: 2022-11-22

## 2022-12-08 RX ORDER — FENTANYL CITRATE 50 UG/ML
INJECTION, SOLUTION INTRAMUSCULAR; INTRAVENOUS PRN
Status: DISCONTINUED | OUTPATIENT
Start: 2022-12-08 | End: 2022-12-08 | Stop reason: SDUPTHER

## 2022-12-08 RX ORDER — ONDANSETRON 2 MG/ML
4 INJECTION INTRAMUSCULAR; INTRAVENOUS EVERY 6 HOURS PRN
Status: DISCONTINUED | OUTPATIENT
Start: 2022-12-08 | End: 2022-12-17 | Stop reason: HOSPADM

## 2022-12-08 RX ORDER — DEXAMETHASONE SODIUM PHOSPHATE 4 MG/ML
INJECTION, SOLUTION INTRA-ARTICULAR; INTRALESIONAL; INTRAMUSCULAR; INTRAVENOUS; SOFT TISSUE PRN
Status: DISCONTINUED | OUTPATIENT
Start: 2022-12-08 | End: 2022-12-08 | Stop reason: SDUPTHER

## 2022-12-08 RX ORDER — LABETALOL HYDROCHLORIDE 5 MG/ML
10 INJECTION, SOLUTION INTRAVENOUS
Status: DISCONTINUED | OUTPATIENT
Start: 2022-12-08 | End: 2022-12-08

## 2022-12-08 RX ORDER — MORPHINE SULFATE 2 MG/ML
2 INJECTION, SOLUTION INTRAMUSCULAR; INTRAVENOUS
Status: DISCONTINUED | OUTPATIENT
Start: 2022-12-08 | End: 2022-12-08 | Stop reason: SDUPTHER

## 2022-12-08 RX ORDER — SODIUM CHLORIDE 0.9 % (FLUSH) 0.9 %
5-40 SYRINGE (ML) INJECTION PRN
Status: DISCONTINUED | OUTPATIENT
Start: 2022-12-08 | End: 2022-12-08 | Stop reason: SDUPTHER

## 2022-12-08 RX ORDER — MAGNESIUM SULFATE HEPTAHYDRATE 500 MG/ML
INJECTION, SOLUTION INTRAMUSCULAR; INTRAVENOUS PRN
Status: DISCONTINUED | OUTPATIENT
Start: 2022-12-08 | End: 2022-12-08 | Stop reason: SDUPTHER

## 2022-12-08 RX ADMIN — GLYCOPYRROLATE 0.2 MG: 0.2 INJECTION, SOLUTION INTRAMUSCULAR; INTRAVENOUS at 17:31

## 2022-12-08 RX ADMIN — FAMOTIDINE 20 MG: 10 INJECTION INTRAVENOUS at 17:24

## 2022-12-08 RX ADMIN — PIPERACILLIN AND TAZOBACTAM 3375 MG: 3; .375 INJECTION, POWDER, FOR SOLUTION INTRAVENOUS at 12:54

## 2022-12-08 RX ADMIN — SUGAMMADEX 200 MG: 100 INJECTION, SOLUTION INTRAVENOUS at 19:21

## 2022-12-08 RX ADMIN — SUCCINYLCHOLINE CHLORIDE 200 MG: 20 INJECTION, SOLUTION INTRAMUSCULAR; INTRAVENOUS at 17:34

## 2022-12-08 RX ADMIN — PIPERACILLIN AND TAZOBACTAM 3375 MG: 3; .375 INJECTION, POWDER, FOR SOLUTION INTRAVENOUS at 22:00

## 2022-12-08 RX ADMIN — ONDANSETRON 4 MG: 2 INJECTION INTRAMUSCULAR; INTRAVENOUS at 23:11

## 2022-12-08 RX ADMIN — MORPHINE SULFATE 4 MG: 4 INJECTION, SOLUTION INTRAMUSCULAR; INTRAVENOUS at 15:44

## 2022-12-08 RX ADMIN — ONDANSETRON 4 MG: 2 INJECTION INTRAMUSCULAR; INTRAVENOUS at 17:43

## 2022-12-08 RX ADMIN — HYDROMORPHONE HYDROCHLORIDE 0.5 MG: 2 INJECTION, SOLUTION INTRAMUSCULAR; INTRAVENOUS; SUBCUTANEOUS at 19:54

## 2022-12-08 RX ADMIN — MORPHINE SULFATE 4 MG: 4 INJECTION, SOLUTION INTRAMUSCULAR; INTRAVENOUS at 23:11

## 2022-12-08 RX ADMIN — PHENYLEPHRINE HYDROCHLORIDE 50 MCG: 10 INJECTION INTRAVENOUS at 18:21

## 2022-12-08 RX ADMIN — FAMOTIDINE 20 MG: 10 INJECTION INTRAVENOUS at 01:08

## 2022-12-08 RX ADMIN — FAMOTIDINE 20 MG: 10 INJECTION INTRAVENOUS at 21:53

## 2022-12-08 RX ADMIN — SODIUM CHLORIDE: 9 INJECTION, SOLUTION INTRAVENOUS at 18:59

## 2022-12-08 RX ADMIN — FAMOTIDINE 20 MG: 10 INJECTION INTRAVENOUS at 14:15

## 2022-12-08 RX ADMIN — KETAMINE HYDROCHLORIDE 25 MG: 10 INJECTION, SOLUTION INTRAMUSCULAR; INTRAVENOUS at 17:55

## 2022-12-08 RX ADMIN — LIDOCAINE HYDROCHLORIDE 100 MG: 20 INJECTION, SOLUTION INFILTRATION; PERINEURAL at 17:34

## 2022-12-08 RX ADMIN — ROCURONIUM BROMIDE 20 MG: 10 INJECTION, SOLUTION INTRAVENOUS at 17:43

## 2022-12-08 RX ADMIN — ALBUMIN (HUMAN) 250 ML: 12.5 INJECTION, SOLUTION INTRAVENOUS at 17:50

## 2022-12-08 RX ADMIN — MORPHINE SULFATE 4 MG: 4 INJECTION, SOLUTION INTRAMUSCULAR; INTRAVENOUS at 12:46

## 2022-12-08 RX ADMIN — ROCURONIUM BROMIDE 20 MG: 10 INJECTION, SOLUTION INTRAVENOUS at 18:07

## 2022-12-08 RX ADMIN — KETAMINE HYDROCHLORIDE 25 MG: 10 INJECTION, SOLUTION INTRAMUSCULAR; INTRAVENOUS at 17:46

## 2022-12-08 RX ADMIN — PHENYLEPHRINE HYDROCHLORIDE 50 MCG: 10 INJECTION INTRAVENOUS at 18:50

## 2022-12-08 RX ADMIN — DEXAMETHASONE SODIUM PHOSPHATE 4 MG: 4 INJECTION, SOLUTION INTRAMUSCULAR; INTRAVENOUS at 17:40

## 2022-12-08 RX ADMIN — PHENYLEPHRINE HYDROCHLORIDE 50 MCG: 10 INJECTION INTRAVENOUS at 18:39

## 2022-12-08 RX ADMIN — PHENYLEPHRINE HYDROCHLORIDE 50 MCG: 10 INJECTION INTRAVENOUS at 18:30

## 2022-12-08 RX ADMIN — ROCURONIUM BROMIDE 10 MG: 10 INJECTION, SOLUTION INTRAVENOUS at 17:34

## 2022-12-08 RX ADMIN — FENTANYL CITRATE 50 MCG: 50 INJECTION, SOLUTION INTRAMUSCULAR; INTRAVENOUS at 17:45

## 2022-12-08 RX ADMIN — MIDAZOLAM 2 MG: 1 INJECTION INTRAMUSCULAR; INTRAVENOUS at 17:27

## 2022-12-08 RX ADMIN — PHENYLEPHRINE HYDROCHLORIDE 100 MCG: 10 INJECTION INTRAVENOUS at 19:03

## 2022-12-08 RX ADMIN — ONDANSETRON 4 MG: 2 INJECTION INTRAMUSCULAR; INTRAVENOUS at 17:10

## 2022-12-08 RX ADMIN — PHENYLEPHRINE HYDROCHLORIDE 50 MCG: 10 INJECTION INTRAVENOUS at 17:41

## 2022-12-08 RX ADMIN — ROCURONIUM BROMIDE 10 MG: 10 INJECTION, SOLUTION INTRAVENOUS at 18:52

## 2022-12-08 RX ADMIN — SODIUM CHLORIDE, PRESERVATIVE FREE 10 ML: 5 INJECTION INTRAVENOUS at 12:55

## 2022-12-08 RX ADMIN — MAGNESIUM SULFATE HEPTAHYDRATE 1 G: 500 INJECTION, SOLUTION INTRAMUSCULAR; INTRAVENOUS at 17:39

## 2022-12-08 RX ADMIN — SODIUM CHLORIDE: 9 INJECTION, SOLUTION INTRAVENOUS at 21:59

## 2022-12-08 RX ADMIN — PHENYLEPHRINE HYDROCHLORIDE 50 MCG: 10 INJECTION INTRAVENOUS at 18:02

## 2022-12-08 RX ADMIN — SODIUM CHLORIDE, POTASSIUM CHLORIDE, SODIUM LACTATE AND CALCIUM CHLORIDE: 600; 310; 30; 20 INJECTION, SOLUTION INTRAVENOUS at 16:23

## 2022-12-08 RX ADMIN — Medication 10 ML: at 12:55

## 2022-12-08 RX ADMIN — PHENYLEPHRINE HYDROCHLORIDE 100 MCG: 10 INJECTION INTRAVENOUS at 17:49

## 2022-12-08 RX ADMIN — FENTANYL CITRATE 50 MCG: 50 INJECTION, SOLUTION INTRAMUSCULAR; INTRAVENOUS at 17:34

## 2022-12-08 RX ADMIN — SODIUM CHLORIDE, POTASSIUM CHLORIDE, SODIUM LACTATE AND CALCIUM CHLORIDE: 600; 310; 30; 20 INJECTION, SOLUTION INTRAVENOUS at 01:08

## 2022-12-08 RX ADMIN — PROPOFOL 200 MG: 10 INJECTION, EMULSION INTRAVENOUS at 17:34

## 2022-12-08 RX ADMIN — Medication 10 ML: at 22:00

## 2022-12-08 RX ADMIN — HYDROMORPHONE HYDROCHLORIDE 0.5 MG: 2 INJECTION, SOLUTION INTRAMUSCULAR; INTRAVENOUS; SUBCUTANEOUS at 20:27

## 2022-12-08 ASSESSMENT — PAIN DESCRIPTION - LOCATION
LOCATION: ABDOMEN

## 2022-12-08 ASSESSMENT — PAIN - FUNCTIONAL ASSESSMENT: PAIN_FUNCTIONAL_ASSESSMENT: PREVENTS OR INTERFERES SOME ACTIVE ACTIVITIES AND ADLS

## 2022-12-08 ASSESSMENT — PAIN SCALES - GENERAL
PAINLEVEL_OUTOF10: 2
PAINLEVEL_OUTOF10: 7
PAINLEVEL_OUTOF10: 10
PAINLEVEL_OUTOF10: 7
PAINLEVEL_OUTOF10: 10

## 2022-12-08 ASSESSMENT — ENCOUNTER SYMPTOMS
SHORTNESS OF BREATH: 0
VOMITING: 1
ABDOMINAL PAIN: 1
RESPIRATORY NEGATIVE: 1
EYES NEGATIVE: 1
NAUSEA: 1

## 2022-12-08 ASSESSMENT — PAIN DESCRIPTION - DESCRIPTORS
DESCRIPTORS: ACHING
DESCRIPTORS: DISCOMFORT

## 2022-12-08 ASSESSMENT — LIFESTYLE VARIABLES
HOW MANY STANDARD DRINKS CONTAINING ALCOHOL DO YOU HAVE ON A TYPICAL DAY: 1 OR 2
HOW OFTEN DO YOU HAVE A DRINK CONTAINING ALCOHOL: MONTHLY OR LESS

## 2022-12-08 ASSESSMENT — PAIN DESCRIPTION - ORIENTATION: ORIENTATION: RIGHT;LEFT

## 2022-12-08 NOTE — ED NOTES
Patient in bed sleeping upon room entry for hourly rounding. Patient did not wake upon room entry. No acute signs or symptoms of distress noted at this time. Respiratory status stable and VS WDL. Call light within reach.      Rony Wylie RN  12/08/22 0200

## 2022-12-08 NOTE — ED NOTES
Patient in bed resting upon room entry for hourly rounding. Patient repositioned for comfort: pulled up in bed, pillows readjusted around patient, and blankets straightened. No acute signs or symptoms of distress noted at this time. Respiratory status stable and VS WDL. Patient denies any needs, concerns, or complaints at this time. Call light within reach.      Kenyon Sutherland RN  12/08/22 4477

## 2022-12-08 NOTE — ED NOTES
Patient in bed sleeping upon room entry for hourly rounding. Patient did not wake upon room entry. No acute signs or symptoms of distress noted at this time. Respiratory status stable and VS WDL. Call light within reach.      Khadar Christianson RN  12/08/22 9420

## 2022-12-08 NOTE — PROGRESS NOTES
Pt. To OR now. Spouse at [facility] to go to OR with pt. Humaira Sawyer [facility] Proxy (HCP)

## 2022-12-08 NOTE — DISCHARGE SUMMARY
[facility] DISCHARGE SUMMARY    Patient Demographics    Patient. Jennyfer Farmer  Date of Birth. 1960  MRN. [id number]     Primary care provider. Gerda Paul ,   (Tel: [phone])    Admit date: 12/1/2022    Discharge date (blank if same as Note Date): 12/6/2022  Note Date: 12/8/2022     Reason for Hospitalization. Chief Complaint   Patient presents with    Abdominal Pain     Pt reports sharp abdominal pain onset 4pm today. Reports n/v/d for a few days. Hx DM, wife states pt started trulicity approx 1 month ago and unsure if this is a side effect. States his blood glucose was 130 at home today. [facility] Course. Small bowel obstruction  -Patient treated with NG to decompression. With improvement in symptoms patient was put on regular diet tolerating well patient was discharged stable after general surgery clearance    Consults. IP CONSULT TO GENERAL SURGERY    Physical examination on discharge day. /70   Pulse 68   Temp 98 °F (36.7 °C) (Oral)   Resp 16   Ht 5' 11\" (1.803 m)   Wt 195 lb (88.5 kg)   SpO2 97%   BMI 27.20 kg/m²   General appearance. Alert. Looks comfortable. HEENT. Sclera clear. Moist mucus membranes. Cardiovascular. Regular rate and rhythm, normal S1, S2. No murmur. Respiratory. Not using accessory muscles. Clear to auscultation bilaterally, no wheeze. Gastrointestinal. Abdomen soft, non-tender, not distended, normal bowel sounds  Neurology. Facial symmetry. No speech deficits. Moving all extremities equally. Extremities. No edema in lower extremities. Skin. Warm, dry, normal turgor    Condition at time of discharge stable     Medication instructions provided to patient at discharge.      Medication List        CHANGE how you take these medications      atorvastatin 40 MG tablet  Commonly known as: LIPITOR  What changed: Another medication with the same name was removed. Continue taking this medication, and follow the directions you see here. CONTINUE taking these medications      acetaminophen 325 MG tablet  Commonly known as: TYLENOL     aspirin 81 MG EC tablet     Assure Pro Blood Glucose Meter Meliza     cyanocobalamin 1000 MCG tablet     famotidine 20 MG tablet  Commonly known as: PEPCID     Jardiance 10 MG tablet  Generic drug: empagliflozin     meloxicam 7.5 MG tablet  Commonly known as: MOBIC     metFORMIN 1000 MG tablet  Commonly known as: GLUCOPHAGE     sildenafil 25 MG tablet  Commonly known as: VIAGRA     SITagliptin 50 MG tablet  Commonly known as: JANUVIA     * Trulicity 8.55 VL/0.6TU Sopn  Generic drug: Dulaglutide     * Trulicity 1.5 HB/0.1RA SC injection  Generic drug: dulaglutide           * This list has 2 medication(s) that are the same as other medications prescribed for you. Read the directions carefully, and ask your doctor or other care provider to review them with you. STOP taking these medications      glipiZIDE 10 MG tablet  Commonly known as: GLUCOTROL     ondansetron 4 MG disintegrating tablet  Commonly known as: ZOFRAN-ODT              Discharge recommendations given to patient. Follow Up. pcp in 1 week   Disposition. home  Activity. activity as tolerated  Diet: No diet orders on file      Spent 45  minutes in discharge process.     Signed:  Meghna Jones MD     12/8/2022 2:59 PM

## 2022-12-08 NOTE — ED NOTES
Patient in bed sleeping upon room entry for hourly rounding. Patient did not wake upon room entry. No acute signs or symptoms of distress noted at this time. Respiratory status stable and VS WDL. Call light within reach.      Rony Wylie RN  12/08/22 0662

## 2022-12-08 NOTE — ED NOTES
Patient in bed sleeping upon room entry for hourly rounding. Patient did not wake upon room entry. No acute signs or symptoms of distress noted at this time. Respiratory status stable and VS WDL. Call light within reach.      Vinnie Shepherd, NIRAJ  12/08/22 4745

## 2022-12-08 NOTE — ED NOTES
Patient in bed sleeping upon room entry for hourly rounding. Patient did not wake upon room entry. No acute signs or symptoms of distress noted at this time. Respiratory status stable and VS WDL. Call light within reach.      Katelyn Guy RN  12/08/22 0356

## 2022-12-08 NOTE — H&P
HOSPITALISTS HISTORY AND PHYSICAL    12/8/2022 12:27 AM    Patient Information:  Rangel Castillo is a 60 y.o. male [id number]  PCP:  Johny Harrington DO, DO (Tel: [phone] )    Chief complaint:    Chief Complaint   Patient presents with    Abdominal Pain     Arrived per family d/t abd pain; discharged from [facility] yesterday for same symptom        History of Present Illness:  Maximilian Sawyer is a 60 y.o. 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 Patient presents the emergency room for cute onset of abdominal pain. He states the pain was 11 out of 10 in his lower abdomen after he tried to eat lunch yesterday. He did have nausea but no vomiting. His last bowel movement was yesterday. He reports significant relief of his pain now down to 4 out of 10 after IV pain medicine. He was recently admitted on 12/1/2022 for small bowel obstruction he was managed conservatively with NG decompression. He was able to gradually advance his diet and had a bowel movement prior to discharge. History obtained from patient       REVIEW OF SYSTEMS:   Review of Systems   Constitutional:  Positive for appetite change. Negative for fatigue and fever. HENT: Negative. Eyes: Negative. Respiratory: Negative. Cardiovascular: Negative. Gastrointestinal:  Positive for abdominal pain, nausea and vomiting. Genitourinary: Negative. Musculoskeletal: Negative. Skin: Negative. Neurological: Negative. Hematological: Negative. Psychiatric/Behavioral: Negative. All other systems reviewed and are negative. Past Medical History:   has a past medical history of DM (diabetes mellitus) ([address] HLD (hyperlipidemia), and Tubulovillous adenoma of colon. Past Surgical History:   has a past surgical history that includes hemicolectomy. Medications:  No current facility-administered medications on file prior to encounter. Current Outpatient Medications on File Prior to Encounter   Medication Sig Dispense Refill    Blood Glucose Monitoring Suppl (ASSURE PRO BLOOD GLUCOSE METER) NUVIA Use as instructed      acetaminophen (TYLENOL) 325 MG tablet Take 975 mg by mouth 3 times daily      aspirin 81 MG EC tablet Take 81 mg by mouth daily      atorvastatin (LIPITOR) 40 MG tablet Take 40 mg by mouth daily      cyanocobalamin 1000 MCG tablet Take 1,000 mcg by mouth daily      TRULICITY 0.64 SV/1.7CC SOPN USE 0.75 MG ONCE A WEEK FOR 30 DAYS. TRULICITY 1.5 FM/0.3GS SC injection USE 1.5 MG ONCE A WEEK FOR 84 DAYS. JARDIANCE 10 MG tablet TAKE 1 TABLET BY MOUTH EVERY DAY      famotidine (PEPCID) 20 MG tablet Take 20 mg by mouth 2 times daily (Patient not taking: Reported on 12/7/2022)      metFORMIN (GLUCOPHAGE) 1000 MG tablet Take 1,000 mg by mouth 2 times daily (with meals)      meloxicam (MOBIC) 7.5 MG tablet Take 7.5 mg by mouth daily      SITagliptin (JANUVIA) 50 MG tablet Take 50 mg by mouth daily (Patient not taking: Reported on 12/7/2022)      sildenafil (VIAGRA) 25 MG tablet Take 25 mg by mouth as needed         Allergies:  No Known Allergies     Social History:  Patient Lives with wife   reports that he has never smoked. He has never used smokeless tobacco. He reports current alcohol use. He reports that he does not use drugs. Family History:  family history includes Cancer in his mother; Diabetes in his mother. ,     Physical Exam:  /84   Pulse 73   Temp 98.4 °F (36.9 °C) (Oral)   Resp 18   Ht 5' 11\" (1.803 m)   Wt 190 lb (86.2 kg)   SpO2 97%   BMI 26.50 kg/m²   Physical Exam  Vitals and nursing note reviewed. Constitutional:       General: He is not in acute distress. Appearance: He is ill-appearing. HENT:      Head: Normocephalic.       Nose:      Comments: Minimal blood drainage from left nare, N/G in place Mouth/Throat:      Mouth: Mucous membranes are dry. Eyes:      Extraocular Movements: Extraocular movements intact. Pupils: Pupils are equal, round, and reactive to light. Cardiovascular:      Rate and Rhythm: Normal rate and regular rhythm. Pulses: Normal pulses. Heart sounds: No murmur heard. Pulmonary:      Effort: Pulmonary effort is normal. No respiratory distress. Breath sounds: Normal breath sounds. Abdominal:      General: Abdomen is flat. There is no distension. Palpations: Abdomen is soft. Tenderness: There is abdominal tenderness. There is guarding. There is no rebound. Comments: Bowel sounds hypoactive   Musculoskeletal:         General: Normal range of motion. Right lower leg: No edema. Left lower leg: No edema. Skin:     General: Skin is warm and dry. Capillary Refill: Capillary refill takes less than 2 seconds. Coloration: Skin is not jaundiced. Neurological:      General: No focal deficit present. Mental Status: He is alert and oriented to person, place, and time. Cranial Nerves: No cranial nerve deficit.    Psychiatric:         Mood and Affect: Mood normal.         Behavior: Behavior normal.       Labs:  CBC:   Lab Results   Component Value Date/Time    WBC 9.7 12/07/2022 06:31 PM    RBC 5.14 12/07/2022 06:31 PM    HGB 12.4 12/07/2022 06:31 PM    HCT 39.7 12/07/2022 06:31 PM    MCV 77.2 12/07/2022 06:31 PM    MCH 24.2 12/07/2022 06:31 PM    MCHC 31.3 12/07/2022 06:31 PM    RDW 16.3 12/07/2022 06:31 PM     12/07/2022 06:31 PM    MPV 8.4 12/07/2022 06:31 PM     BMP:    Lab Results   Component Value Date/Time     12/07/2022 06:31 PM    K 4.1 12/07/2022 06:31 PM     12/07/2022 06:31 PM    CO2 30 12/07/2022 06:31 PM    BUN 11 12/07/2022 06:31 PM    CREATININE 0.7 12/07/2022 06:31 PM    CALCIUM 9.5 12/07/2022 06:31 PM    LABGLOM >60 12/07/2022 06:31 PM    GLUCOSE 188 12/07/2022 06:31 PM     CT ABDOMEN PELVIS W IV CONTRAST Additional Contrast? None   Final Result   1. High-grade small bowel obstruction with a focal transition point seen in   the mid abdomen as described above, with a mild degree of mesenteric swirling   seen on coronal and sagittal imaging, which can be associated with closed   loop obstruction as well as possible internal volvulus. 2. Small hiatal hernia with fluid in the distal thoracic esophagus suggestive   of reflux. 3. Small volume of reactive ascites. 4. Dependent subsegmental atelectatic changes. XR ABDOMEN FOR NG/OG/NE TUBE PLACEMENT    (Results Pending)     Chest Xray:   EKG:    I visualized CXR images and EKG strips    Problem List  Active Problems:    * No active hospital problems. *  Resolved Problems:    * No resolved hospital problems. *        Assessment/Plan:   High Grade Small Bowel Obstruction   Admit inpatient with telemetry. CT ? Closed loop obstruction, volvulus. Patient however had signficant improvement in pain after one dose of IV pain medications  Surgery was consulted by ER, this was discussed with Justin MENDIOLA in ER. N/G placed to LIS  NPO   IVF fluids  Lactic acid normal, will recheck in am  Labs in am  IV pain medications  Monitor closely overnight if patient has worsening of abdominal pain will need to contact surgery. This was discussed with patient. 2. Hyperlipidemia  Hold statin    3. Type II DM controlled  Hold oral agents, sliding scale insulin    Patient was discussed with Dr. Erin Smith as well. DVT prophylaxis Lovenox   Code status Full   Diet NPO  IV access peripheral   Loera Catheter none    Admit as inpatient. I anticipate hospitalization spanning more than two midnights for investigation and treatment of the above medically necessary diagnoses. Please note that some part of this chart was generated using Dragon dictation software.  Although every effort was made to ensure the accuracy of this automated transcription, some errors in transcription may have occurred inadvertently. If you may need any clarification, please do not hesitate to contact me through [facility].        MILLIE Crane - CARLOZ    12/8/2022 12:27 AM

## 2022-12-08 NOTE — ED NOTES
This RN at bedside with , NG tube repositioned  NPO at this time, awaiting surgery     Yeimi Odonnell, NIRAJ  12/08/22 [id number]

## 2022-12-08 NOTE — ED NOTES
NG tube placed 52cm R nostril. Pt tolerated well. Awaiting placement via x ray.       Erica Oconnell RN  12/07/22 [id number]

## 2022-12-08 NOTE — ANESTHESIA PRE PROCEDURE
Department of Anesthesiology  Preprocedure Note       Name:  Sherren Goon   Age:  60 y.o.  :  1960                                          MRN:  [id number]         Date:  2022      Surgeon: Morena Ragsdale):  Joseph Jones MD    Procedure: Procedure(s):  EXPLORATORY LAPAROTOMY, LYSIS OF ADHESIONS, POSSIBLE SMALL BOWEL RESECTION    Medications prior to admission:   Prior to Admission medications    Medication Sig Start Date End Date Taking? Authorizing Provider   ferrous sulfate (IRON 325) 325 (65 Fe) MG tablet Take 325 mg by mouth with breakfast and with evening meal 19  Yes Historical Provider, MD   lisinopril (PRINIVIL;ZESTRIL) 40 MG tablet Take 40 mg by mouth daily 22   Historical Provider, MD   Blood Glucose Monitoring Suppl (ASSURE PRO BLOOD GLUCOSE METER) NUVIA Use as instructed 19   Historical Provider, MD   acetaminophen (TYLENOL) 325 MG tablet Take 650 mg by mouth 3 times daily Up to three tablets based on pain level 20   Historical Provider, MD   aspirin 81 MG EC tablet Take 81 mg by mouth daily 20   Historical Provider, MD   atorvastatin (LIPITOR) 40 MG tablet Take 40 mg by mouth daily 19   Historical Provider, MD   cyanocobalamin 1000 MCG tablet Take 1,000 mcg by mouth daily 19   Historical Provider, MD   TRULICITY 5.60 ZN/4.4FJ SOPN USE 0.75 MG ONCE A WEEK FOR 30 DAYS. Patient not taking: No sig reported 22   Historical Provider, MD   TRULICITY 1.5 DK/8.4TV SC injection USE 1.5 MG ONCE A WEEK FOR 84 DAYS.  22   Historical Provider, MD   JARDIANCE 10 MG tablet TAKE 1 TABLET BY MOUTH EVERY DAY 22   Historical Provider, MD   famotidine (PEPCID) 20 MG tablet Take 20 mg by mouth 2 times daily  Patient not taking: Reported on 2022   Historical Provider, MD   metFORMIN (GLUCOPHAGE) 1000 MG tablet Take 1,000 mg by mouth 2 times daily (with meals) 19   Historical Provider, MD   meloxicam (MOBIC) 7.5 MG tablet Take 7.5 mg by mouth daily    Historical Provider, MD   SITagliptin (JANUVIA) 50 MG tablet Take 50 mg by mouth daily  Patient not taking: Reported on 12/7/2022 4/30/19   Historical Provider, MD   sildenafil (VIAGRA) 25 MG tablet Take 25 mg by mouth as needed 4/30/19   Historical Provider, MD       Current medications:    Current Facility-Administered Medications   Medication Dose Route Frequency Provider Last Rate Last Admin    famotidine (PEPCID) 20 mg in sodium chloride (PF) 0.9 % 10 mL injection  20 mg IntraVENous BID Alejandra Minick, APRN - CNP   20 mg at 12/08/22 1415    insulin lispro (HUMALOG) injection vial 0-4 Units  0-4 Units SubCUTAneous TID WC Alejandra Minick, APRN - CNP        insulin lispro (HUMALOG) injection vial 0-4 Units  0-4 Units SubCUTAneous Nightly Alejandra Minick, APRN - CNP        dextrose bolus 10% 125 mL  125 mL IntraVENous PRN Alejandra Minick, APRN - CNP        Or    dextrose bolus 10% 250 mL  250 mL IntraVENous PRN Alejandra Minick, APRN - CNP        glucagon (rDNA) injection 1 mg  1 mg SubCUTAneous PRN Alejandra Minick, APRN - CNP        dextrose 10 % infusion   IntraVENous Continuous PRN Alejandra Minick, APRN - CNP        sodium chloride flush 0.9 % injection 5-40 mL  5-40 mL IntraVENous 2 times per day Alejandra Minick, APRN - CNP   10 mL at 12/08/22 1255    sodium chloride flush 0.9 % injection 5-40 mL  5-40 mL IntraVENous PRN Alejandra Minick, APRN - CNP   10 mL at 12/08/22 1255    0.9 % sodium chloride infusion   IntraVENous PRN Alejandra Minick, APRN - CNP        [START ON 12/9/2022] enoxaparin (LOVENOX) injection 40 mg  40 mg SubCUTAneous Daily Alejandra Minick, APRN - CNP        ondansetron (ZOFRAN-ODT) disintegrating tablet 4 mg  4 mg Oral Q8H PRN Alejandra Minick, APRN - CNP        Or    ondansetron (ZOFRAN) injection 4 mg  4 mg IntraVENous Q6H PRN Alejandra Minick, APRN - CNP        polyethylene glycol (GLYCOLAX) packet 17 g  17 g Oral Daily PRN Alejandra Minick, APRN - CNP        acetaminophen (TYLENOL) tablet 650 mg  650 mg Oral Q6H PRN Alejandra Minick, APRN - CNP        Or    acetaminophen (TYLENOL) suppository 650 mg  650 mg Rectal Q6H PRN Alejandra Minick, APRN - CNP        lactated ringers infusion   IntraVENous Continuous Alejandra Minick, APRN -  mL/hr at 12/08/22 1623 New Bag at 12/08/22 1623    morphine (PF) injection 2 mg  2 mg IntraVENous Q2H PRN Alejandra Minick, APRN - CNP        Or    morphine injection 4 mg  4 mg IntraVENous Q2H PRN Alejandra Minick, APRN - CNP   4 mg at 12/08/22 1544    piperacillin-tazobactam (ZOSYN) 3,375 mg in dextrose 5 % 50 mL IVPB extended infusion (mini-bag)  3,375 mg IntraVENous Tino Muller MD   Stopped at 12/08/22 1456    phenol 1.4 % mouth spray 1 spray  1 spray Mouth/Throat Q2H PRN Sherie Hall MD           Allergies:  No Known Allergies    Problem List:    Patient Active Problem List   Diagnosis Code    SBO (small bowel obstruction) ([facility] [address] K56.609       Past Medical History:        Diagnosis Date    DM (diabetes mellitus) ([facility] [address]     HLD (hyperlipidemia)     Tubulovillous adenoma of colon        Past Surgical History:        Procedure Laterality Date    HEMICOLECTOMY         Social History:    Social History     Tobacco Use    Smoking status: Never    Smokeless tobacco: Never   Substance Use Topics    Alcohol use: Yes     Comment: occasional beer                                Counseling given: Not Answered      Vital Signs (Current):   Vitals:    12/08/22 0903 12/08/22 0918 12/08/22 0933 12/08/22 1456   BP: (!) 137/90   (!) 164/87   Pulse: 70 88 78 74   Resp: 16 13 16 19   Temp:    98.1 °F (36.7 °C)   TempSrc:    Temporal   SpO2:  93% 98% 98%   Weight:       Height:                                                  BP Readings from Last 3 Encounters:   12/08/22 (!) 164/87   12/06/22 128/70       NPO Status:                                                                                 BMI:   Wt Readings from Last 3 Encounters:   12/07/22 190 lb (86.2 kg)   12/01/22 195 lb (88.5 kg)     Body mass index is 26.5 kg/m².     CBC:   Lab Results   Component Value Date/Time    WBC 9.7 12/07/2022 06:31 PM    RBC 5.14 12/07/2022 06:31 PM    HGB 12.4 12/07/2022 06:31 PM    HCT 39.7 12/07/2022 06:31 PM    MCV 77.2 12/07/2022 06:31 PM    RDW 16.3 12/07/2022 06:31 PM     12/07/2022 06:31 PM       CMP:   Lab Results   Component Value Date/Time     12/07/2022 06:31 PM    K 4.1 12/07/2022 06:31 PM     12/07/2022 06:31 PM    CO2 30 12/07/2022 06:31 PM    BUN 11 12/07/2022 06:31 PM    CREATININE 0.7 12/07/2022 06:31 PM    AGRATIO 1.1 12/07/2022 06:31 PM    LABGLOM >60 12/07/2022 06:31 PM    GLUCOSE 188 12/07/2022 06:31 PM    PROT 7.0 12/07/2022 06:31 PM    CALCIUM 9.5 12/07/2022 06:31 PM    BILITOT <0.2 12/07/2022 06:31 PM    ALKPHOS 74 12/07/2022 06:31 PM    AST 17 12/07/2022 06:31 PM    ALT 13 12/07/2022 06:31 PM       POC Tests:   Recent Labs     12/06/22  1630   POCGLU 120*       Coags:   Lab Results   Component Value Date/Time    PROTIME 13.7 12/02/2022 04:30 AM    INR 1.06 12/02/2022 04:30 AM    APTT 36.2 12/02/2022 04:30 AM       HCG (If Applicable): No results found for: PREGTESTUR, PREGSERUM, HCG, HCGQUANT     ABGs: No results found for: PHART, PO2ART, PZB0XLU, CXQ6IOD, BEART, F9RPMQWU     Type & Screen (If Applicable):  Lab Results   Component Value Date    LABABO O 12/30/2019    LABRH Positive 12/30/2019       Drug/Infectious Status (If Applicable):  No results found for: HIV, HEPCAB    COVID-19 Screening (If Applicable): No results found for: COVID19        Anesthesia Evaluation  Patient summary reviewed and Nursing notes reviewed no history of anesthetic complications:   Airway: Mallampati: I  TM distance: >3 FB   Neck ROM: full  Mouth opening: > = 3 FB   Dental: normal exam         Pulmonary:       (-) asthma and shortness of breath                           Cardiovascular:    (+) hyperlipidemia    (-) hypertension and  angina Neuro/Psych:      (-) CVA           GI/Hepatic/Renal:        (-) GERD and liver disease       Endo/Other:    (+) DiabetesType II DM, , .    (-) hypothyroidism               Abdominal:             Vascular:     - PVD. Other Findings:           Anesthesia Plan      general     ASA 2 - emergent       Induction: intravenous. MIPS: Postoperative opioids intended and Prophylactic antiemetics administered. Anesthetic plan and risks discussed with patient. Use of blood products discussed with patient whom. Plan discussed with CRNA.                     Zoe Pizarro MD   12/8/2022

## 2022-12-08 NOTE — CONSULTS
[address] and Laparoscopic Surgery     Consult                                                     Patient Name: Ryan Cervantes  MRN: [id number]  [place]: 1960  Admission date: 12/7/2022  6:09 PM   Date of evaluation: 12/8/2022  Primary Care Physician: Adonay Weinstein DO, DO  Reason for consult: Abdominal pain  History of Present Illness:    Mr. Sharri Milan is a 60 y.o. male who presents with a small bowel obstruction. Recently admitted for the same and just discharged the day prior to admission through the ED yesterday. He recovered with conservative management and just prior to discharge was tolerating diet, passing stool and without pain. Shortly after returning home the same symptoms returned including pressure pain around the umbilicus and lower abdomen. Does not radiate, nothing makes it better or worse. Constant, progressively worse. NG placed last night without relief, but was malpositioned. Since has been repositioned with better output. Last bowel movement and flatus was while admitted several days ago. Otherwise no new interim symptoms. Denies fevers, chills, chest pain, dyspnea, dysuria, other complaints. Surgical history includes a partial colectomy for a large polyp approximately 10 years ago. Several years after developed small bowel obstruction and recovered with medical management and another several years ago. Medical conditions include hypertension, hyperlipidemia, diabetes and chronic anemia.   Up to date on colonoscopy    Past Medical History:        Diagnosis Date    DM (diabetes mellitus) ([address]     HLD (hyperlipidemia)     Tubulovillous adenoma of colon        Past Surgical History:        Procedure Laterality Date    HEMICOLECTOMY         Scheduled Meds:   famotidine (PEPCID) injection  20 mg IntraVENous BID    insulin lispro  0-4 Units SubCUTAneous TID     insulin lispro  0-4 Units SubCUTAneous Nightly    sodium chloride flush  5-40 mL IntraVENous 2 times per day [START ON 12/9/2022] enoxaparin  40 mg SubCUTAneous Daily     Continuous Infusions:   dextrose      sodium chloride      lactated ringers 125 mL/hr at 12/08/22 0108     PRN Meds:.dextrose bolus **OR** dextrose bolus, glucagon (rDNA), dextrose, sodium chloride flush, sodium chloride, ondansetron **OR** ondansetron, polyethylene glycol, acetaminophen **OR** acetaminophen, morphine **OR** morphine    Prior to Admission medications    Medication Sig Start Date End Date Taking? Authorizing Provider   Blood Glucose Monitoring Suppl (ASSURE PRO BLOOD GLUCOSE METER) NUVIA Use as instructed 4/30/19   Historical Provider, MD   acetaminophen (TYLENOL) 325 MG tablet Take 975 mg by mouth 3 times daily 1/14/20   Historical Provider, MD   aspirin 81 MG EC tablet Take 81 mg by mouth daily 1/14/20   Historical Provider, MD   atorvastatin (LIPITOR) 40 MG tablet Take 40 mg by mouth daily 4/30/19   Historical Provider, MD   cyanocobalamin 1000 MCG tablet Take 1,000 mcg by mouth daily 4/30/19   Historical Provider, MD   TRULICITY 2.38 ZQ/7.3YL SOPN USE 0.75 MG ONCE A WEEK FOR 30 DAYS. 9/19/22   Historical Provider, MD   TRULICITY 1.5 ZQ/9.9OI SC injection USE 1.5 MG ONCE A WEEK FOR 84 DAYS.  9/19/22   Historical Provider, MD   JARDIANCE 10 MG tablet TAKE 1 TABLET BY MOUTH EVERY DAY 11/22/22   Historical Provider, MD   famotidine (PEPCID) 20 MG tablet Take 20 mg by mouth 2 times daily  Patient not taking: Reported on 12/7/2022 1/14/20   Historical Provider, MD   metFORMIN (GLUCOPHAGE) 1000 MG tablet Take 1,000 mg by mouth 2 times daily (with meals) 4/30/19   Historical Provider, MD   meloxicam (MOBIC) 7.5 MG tablet Take 7.5 mg by mouth daily    Historical Provider, MD   SITagliptin (JANUVIA) 50 MG tablet Take 50 mg by mouth daily  Patient not taking: Reported on 12/7/2022 4/30/19   Historical Provider, MD   sildenafil (VIAGRA) 25 MG tablet Take 25 mg by mouth as needed 4/30/19   Historical Provider, MD        Allergies:  Patient has no known allergies.     Social History:   Social History     Socioeconomic History    Marital status: Single     Spouse name: None    Number of children: None    Years of education: None    Highest education level: None   Tobacco Use    Smoking status: Never    Smokeless tobacco: Never   Substance and Sexual Activity    Alcohol use: Yes     Comment: occasional beer    Drug use: Never       Family History:    Family History   Problem Relation Age of Onset    Diabetes Mother     Cancer Mother        Review of Systems:  CONSTITUTIONAL:  Negative except as above  HEENT:  negative  RESPIRATORY:  negative  CARDIOVASCULAR:  negative  GASTROINTESTINAL:  negative except as above   GENITOURINARY:  negative  HEMATOLOGIC/LYMPHATIC:  negative  NEUROLOGICAL:  Negative      Vital Signs:  Patient Vitals for the past 24 hrs:   BP Temp Temp src Pulse Resp SpO2 Height Weight   12/08/22 0933 -- -- -- 78 16 98 % -- --   12/08/22 0918 -- -- -- 88 13 93 % -- --   12/08/22 0903 (!) 137/90 -- -- 70 16 -- -- --   12/08/22 0848 -- -- -- 70 19 -- -- --   12/08/22 0833 -- -- -- 69 17 98 % -- --   12/08/22 0818 -- -- -- 70 17 97 % -- --   12/08/22 0803 129/78 -- -- 69 17 97 % -- --   12/08/22 0748 -- -- -- 71 17 97 % -- --   12/08/22 0733 -- -- -- 67 14 96 % -- --   12/08/22 0718 -- -- -- 69 17 96 % -- --   12/08/22 0703 138/82 -- -- 71 17 95 % -- --   12/08/22 0648 -- -- -- 71 17 97 % -- --   12/08/22 0633 -- -- -- 73 16 96 % -- --   12/08/22 0618 -- -- -- 76 20 95 % -- --   12/08/22 0603 -- -- -- 74 16 97 % -- --   12/08/22 0600 128/78 -- -- 72 16 96 % -- --   12/08/22 0548 -- -- -- 68 14 96 % -- --   12/08/22 0533 -- -- -- 70 15 95 % -- --   12/08/22 0500 (!) 141/86 -- -- 77 19 97 % -- --   12/08/22 0400 139/82 -- -- 71 16 95 % -- --   12/08/22 0300 139/79 -- -- 73 14 92 % -- --   12/08/22 0200 130/81 -- -- 73 14 96 % -- --   12/08/22 0103 -- -- -- 73 16 97 % -- --   12/08/22 0102 128/85 -- -- -- -- -- -- --   12/08/22 0026 120/87 -- -- -- -- -- -- --   228 -- -- -- -- -- 97 % -- --   22 2315 136/84 -- -- -- -- 97 % -- --   22 2300 119/72 -- -- -- -- 95 % -- --   22 124/78 -- -- -- -- 95 % -- --   22 -- -- -- 73 -- -- -- --   22 132/79 -- -- -- -- 95 % -- --   22 132/76 -- -- -- -- 95 % -- --   22 132/82 -- -- -- -- 95 % -- --   22 131/76 -- -- 72 -- 95 % -- --   22 132/76 -- -- 75 -- 96 % -- --   22 123/79 -- -- 72 -- 95 % -- --   22 (!) 140/84 -- -- -- -- 99 % -- --   22 1930 133/80 -- -- 71 -- 99 % -- --   22 136/88 98.4 °F (36.9 °C) Oral 83 18 97 % 5' 11\" (1.803 m) 190 lb (86.2 kg)      TEMPERATURE HISTORY 24H: Temp (24hrs), Av.4 °F (36.9 °C), Min:98.4 °F (36.9 °C), Max:98.4 °F (36.9 °C)    BLOOD PRESSURE HISTORY: Systolic (58LFZ), QVM:337 , Min:119 , CCT:827    Diastolic (64HXV), JHT:35, Min:72, Max:90    Admission Weight: 190 lb (86.2 kg)     No intake or output data in the 24 hours ending 22 1043  Drain/tube Output:         Physical Exam:  Constitutional:  well-developed, well-nourished,   Neurologic: awake, Orientation:  Oriented to person, place, time, follows commands, clear speech, cranial nerves grossly intact  Psychiatric: normal affect, no hallucinations  Eyes:  sclera clear, no visible discharge  Head, ears, nose, mouth, throat: normocephalic, without obvious abnormality, supple, symmetrical, trachea midline, no JVD, mucous membranes moist  Cardiovascular: regular rate and rhythm   Respiratory: clear to auscultation  GI: soft, moderate distension, mild mid abdominal tenderness without peritonitis  Lymph: no palpable lymphadenopathy  Skin: no bruising or bleeding, normal skin color, texture, turgor, and no redness, warmth, or swelling    Labs:    CBC:    Recent Labs     22  0410 22  1831   WBC 7.3 9.7   HGB 11.1* 12.4*   HCT 35.9* 39.7*    234     BMP:    Recent Labs 12/06/22  0410 12/07/22 1831    139   K 3.5 4.1    101   CO2 27 30   BUN 8 11   CREATININE 0.7* 0.7*   GLUCOSE 117* 188*     Hepatic:   Recent Labs     12/07/22 1831   AST 17   ALT 13   BILITOT <0.2   ALKPHOS 74     Amylase: No results for input(s): AMYLASE in the last 72 hours. Lipase:   Recent Labs     12/07/22 1831   LIPASE 30.0     Mag:    No results for input(s): MG in the last 72 hours. Phos:   No results for input(s): PHOS in the last 72 hours. Coags: No results for input(s): INR, APTT in the last 72 hours. Imaging:  I have personally reviewed the following films:  CT ABDOMEN PELVIS WO CONTRAST Additional Contrast? None    Result Date: 12/3/2022  EXAMINATION: CT OF THE ABDOMEN AND PELVIS WITHOUT CONTRAST 12/1/2022 9:37 pm TECHNIQUE: CT of the abdomen and pelvis was performed without the administration of intravenous contrast. Multiplanar reformatted images are provided for review. Automated exposure control, iterative reconstruction, and/or weight based adjustment of the mA/kV was utilized to reduce the radiation dose to as low as reasonably achievable. COMPARISON: None. HISTORY: ORDERING SYSTEM PROVIDED HISTORY: abd pain TECHNOLOGIST PROVIDED HISTORY: Reason for exam:->abd pain Additional Contrast?->None Decision Support Exception - unselect if not a suspected or confirmed emergency medical condition->Emergency Medical Condition (MA) Reason for Exam: Abd pain. Abdominal Pain (Pt reports sharp abdominal pain onset 4pm today. Reports n/v/d for a few days. Hx DM, wife states pt started trulicity approx 1 month ago and unsure if this is a side effect. States his blood glucose was 130 at home today. ). FINDINGS: Lower Chest:  Visualized portion of the lower chest demonstrates no acute abnormality. There are coronary artery calcifications. Organs:  The solid organs are incompletely evaluated without intravenous contrast.  The unenhanced liver, spleen, pancreas, kidneys and adrenal glands are without acute findings. Nonobstructing right nephrolithiasis. A few simple appearing renal cysts are noted. The gallbladder is present without radiopaque cholelithiasis. GI/Bowel: Limited evaluation of the bowel without intravenous contrast. There are multiple dilated loops of small bowel consistent with an acute small bowel obstruction. The bowel is somewhat difficult to follow. There appears to be a high-grade transition point in the midline low abdomen anterior to the iliac bifurcation. The distal small bowel is decompressed. Normal caliber of the colon. Diverticulosis without evidence of acute diverticulitis. Status post right hemicolectomy with an unremarkable appearance of the ileocolic anastomosis. Pelvis: The urinary bladder is partially distended without contour abnormality. The prostate and seminal vesicles are not well seen secondary to metallic streak artifact from a left total hip arthroplasty. Mildly enlarged bilateral inguinal lymph nodes which measure up to 1.4 cm in short axis. No definite intrapelvic adenopathy within the limitations of streak artifact. Peritoneum/Retroperitoneum: No ascites or pneumoperitoneum. Mild calcified atherosclerotic plaque. No evidence of lymphadenopathy. The aorta and IVC are normal in caliber. Bones/Soft Tissues: No acute bony or soft tissue abnormality. 1. Acute small bowel obstruction with a high grade transition point in the midline low abdomen anterior to the iliac bifurcation. 2. Status post right hemicolectomy. 3. Nonobstructing right nephrolithiasis. 4. Mildly enlarged bilateral inguinal lymph nodes, nonspecific. CT ABDOMEN PELVIS W IV CONTRAST Additional Contrast? None    Result Date: 12/7/2022  EXAMINATION: CT OF THE ABDOMEN AND PELVIS WITH CONTRAST 12/7/2022 7:35 pm TECHNIQUE: CT of the abdomen and pelvis was performed with the administration of intravenous contrast. Multiplanar reformatted images are provided for review.  Automated exposure control, iterative reconstruction, and/or weight based adjustment of the mA/kV was utilized to reduce the radiation dose to as low as reasonably achievable. COMPARISON: 12/01/2022 HISTORY: ORDERING SYSTEM PROVIDED HISTORY: abd pain h/o sbo TECHNOLOGIST PROVIDED HISTORY: Reason for exam:->abd pain h/o sbo Additional Contrast?->None Decision Support Exception - unselect if not a suspected or confirmed emergency medical condition->Emergency Medical Condition (MA) Reason for Exam: abd pain h/o sbo FINDINGS: Lower Chest: Subsegmental dependent airspace disease favored to represent atelectasis. No consolidation or spiculated lung mass. No pericardial effusion. No cardiomegaly. Coronary artery atherosclerosis. There is a small hiatal hernia. Minimal fluid in the distal thoracic esophagus suggestive of reflux. Organs: No enhancing mass identified in the liver or spleen. Gallbladder appears unremarkable. No adrenal mass. No pancreatic ductal dilation or acute pancreatic abnormality. Nonobstructive calculus noted in the right kidney. There is mild left-sided hydronephrosis without obstructive calculi. GI/Bowel: There is a high-grade small bowel obstruction noted, with a transition point seen on and around axial image 93 through 110. There is also note of a mild degree of mesenteric swirl seen around this region on coronal imaging, on and around coronal image 63. Interloop fluid. Persistent dilated loops of bowel are seen. Small volume ascites. Postoperative change involving the ascending colon. No colonic obstruction. Pelvis: No pelvic mass. The bladder is unremarkable. Pelvic ascites. Peritoneum/Retroperitoneum: No abdominal aortic aneurysm. No dissection. No retroperitoneal or mesenteric lymphadenopathy. Bones/Soft Tissues: No acute subcutaneous soft tissue abnormality. Postoperative changes in the left hip joint. Multilevel degenerative changes are noted within the spine.   No osseous destructive process. Multilevel spinal canal and osseous foraminal stenosis is seen in the lumbar spine. 1. High-grade small bowel obstruction with a focal transition point seen in the mid abdomen as described above, with a mild degree of mesenteric swirling seen on coronal and sagittal imaging, which can be associated with closed loop obstruction as well as possible internal volvulus. 2. Small hiatal hernia with fluid in the distal thoracic esophagus suggestive of reflux. 3. Small volume of reactive ascites. 4. Dependent subsegmental atelectatic changes. XR CHEST PORTABLE    Result Date: 12/2/2022  EXAMINATION: ONE XRAY VIEW OF THE CHEST 12/2/2022 12:02 am COMPARISON: None. HISTORY: ORDERING SYSTEM PROVIDED HISTORY: NG placement TECHNOLOGIST PROVIDED HISTORY: Reason for exam:->NG placement Reason for Exam:  NG placement FINDINGS: There is a nasogastric tube in place with the tip just within the stomach. The gas pattern is unremarkable. Gastric tube tip probably just within the stomach. Recommend readjusting the tube tip into the distal stomach for more physiologic positioning. Nonspecific gas pattern. XR ABDOMEN FOR NG/OG/NE TUBE PLACEMENT    Result Date: 12/8/2022  EXAMINATION: ONE SUPINE XRAY VIEW(S) OF THE ABDOMEN 12/7/2022 11:52 pm COMPARISON: 12/05/2022 HISTORY: ORDERING SYSTEM PROVIDED HISTORY: Confirmation of course of NG/OG/NE tube and location of tip of tube TECHNOLOGIST PROVIDED HISTORY: Reason for exam:->Confirmation of course of NG/OG/NE tube and location of tip of tube Portable? ->Yes Reason for Exam: Confirmation of course of NG/OG/NE tube and location of tip of tube FINDINGS: The enteric catheter is coiled back upon itself in the distal thoracic esophagus, with the tip directed cephalad. Cardiac size is at the upper limits of normal.  Vascular congestion. The enteric catheter tip is coiled upon itself in the distal thoracic esophagus with the tip directed cephalad.      FL SMALL BOWEL FOLLOW THROUGH ONLY    Result Date: 12/5/2022  EXAMINATION: SMALL BOWEL FOLLOW THROUGH SERIES 12/4/2022 TECHNIQUE: Small bowel follow through series was performed with overhead images and spot images. FLUOROSCOPY DOSE AND TYPE OR TIME AND EXPOSURES: 8 images were obtained. COMPARISON: None HISTORY: ORDERING SYSTEM PROVIDED HISTORY: sbo TECHNOLOGIST PROVIDED HISTORY: Reason for exam:->sbo Reason for Exam: sbo FINDINGS:  image of the abdomen demonstrates mildly distended small bowel. Stool noted in the colon. Moderate lumbar degenerative changes identified. Left hip arthroplasty. Severe arthrosis right hip. The stomach and duodenum are normal.  Normal caliber and appearance of the jejunum. Moderately dilated ileum identified measuring up to 4.6 cm. Small bowel transit time is delayed. Contrast is noted in the colon after 4 hours. The colon is not dilated. Findings are highly suggestive of partial small bowel obstruction, with delayed small bowel transit time. XR ABDOMEN (2 VIEWS)    Result Date: 12/5/2022  EXAMINATION: TWO XRAY VIEWS OF THE ABDOMEN 12/5/2022 9:47 am COMPARISON: 12/04/2022 HISTORY: ORDERING SYSTEM PROVIDED HISTORY: SBO TECHNOLOGIST PROVIDED HISTORY: Reason for exam:->SBO Reason for Exam: SBO FINDINGS: Contrast is seen throughout the length of the colon. Air-fluid levels are seen on the upright view. Some contrast is seen within small bowel though the degree of small-bowel contrast has decreased as compared to prior. Small bowel loops are dilated. Persistent though decreased contrast within dilated small bowel as compared to prior with increased contrast in the colon; the findings can be in keeping with partial small bowel obstruction. XR ABDOMEN (2 VIEWS)    Result Date: 12/3/2022  EXAMINATION: TWO XRAY VIEWS OF THE ABDOMEN 12/3/2022 5:08 pm COMPARISON: CT abdomen and pelvis 12/01/2022.  HISTORY: ORDERING SYSTEM PROVIDED HISTORY: SBO TECHNOLOGIST PROVIDED HISTORY: Reason for exam:->SBO Reason for Exam: SBO FINDINGS: The tip of the enteric tube is in the gastric fundus. The side port is at the level of the gastroesophageal junction. Air-filled distended loops of small bowel are seen in the left abdomen with air-fluid levels on the upright view. No evidence of pneumoperitoneum. No acute osseous abnormality. 1. Air-filled distended loops of small bowel in the left abdomen with air-fluid levels on the upright view compatible with an obstruction. 2. Tip of the enteric tube in the gastric fundus and side-port at the level of the gastroesophageal junction. Consider advancement. Cultures:  Relevant cultures documented under results section     Assessment:  Patient Active Problem List   Diagnosis    SBO (small bowel obstruction) (HCC)     Recurrent small bowel obstruction    Plan:  1. The patient has a small bowel obstruction that is likely from adhesive disease. Although recovered from last 3 episodes of small bowel obstructions, his likelihood of recovering from this episode is relatively low and risk of additional episodes is relatively high especially given that he returned to the hospital almost immediately after successfully recovering his last episode this week. Risks of surgical exploration include but not limited to bleeding, infection, damage to surrounding structures, need for additional procedures, hernia and wound development, as well as the perioperative risks associated with general anesthesia itself. Benefits include rapid resolution of the adhesions causing recurrent obstructions and hopeful prevention of future episodes. Alternatives include medical management with bowel rest and NG decompression. Details of procedure discussed. All questions answered. Understands, agrees, wishes to proceed and tentatively on OR schedule for later today for exploratory laparotomy, lysis of adhesions, possible bowel resection  2. Bowel rest, NG decompression  3.  Pain medication and antiemetics as needed  4. IVF, monitor and replace electrolytes  5. Perioperative antibiotics  6. Defer management of remainder of medical comorbidities to primary and consulting teams    This plan was discussed at length with the patient. He was understanding and in agreement with the plan  Thank you for the consult and allowing me to participate in the care of this patient. I look forward to following him this admission    Rey Valles MD, FACS  12/8/2022  10:43 AM

## 2022-12-08 NOTE — PROGRESS NOTES
Pt. To Pre-op area now with wife Bisi Duque at bedside. Report given to Reno Turner receiving nurse.

## 2022-12-08 NOTE — PROGRESS NOTES
Patient seen in ED, room 23. Admission completed except for: 4 Eyes Assessment, Rights and Responsibilities, orientation to room, Plan of Care, education, white board, height and weight, pain assessment and head to toe assessment. Patient is alert and oriented X 4. Patient lives in a single floor condo with his wife and is being admitted for SBO. Home Medication Reconciliation has been verbally reviewed with patient and updated as appropriate. It is now marked completed as the verification with pharmacy has been completed. All questions answered.

## 2022-12-09 LAB
A/G RATIO: 1.3 (ref 1.1–2.2)
ALBUMIN SERPL-MCNC: 2.9 G/DL (ref 3.4–5)
ALP BLD-CCNC: 50 U/L (ref 40–129)
ALT SERPL-CCNC: 12 U/L (ref 10–40)
ANION GAP SERPL CALCULATED.3IONS-SCNC: 10 MMOL/L (ref 3–16)
APTT: 32.6 SEC (ref 23–34.3)
AST SERPL-CCNC: 14 U/L (ref 15–37)
BASOPHILS ABSOLUTE: 0 K/UL (ref 0–0.2)
BASOPHILS RELATIVE PERCENT: 0.1 %
BILIRUB SERPL-MCNC: 0.5 MG/DL (ref 0–1)
BUN BLDV-MCNC: 9 MG/DL (ref 7–20)
CALCIUM SERPL-MCNC: 7.8 MG/DL (ref 8.3–10.6)
CHLORIDE BLD-SCNC: 105 MMOL/L (ref 99–110)
CO2: 27 MMOL/L (ref 21–32)
CREAT SERPL-MCNC: 0.8 MG/DL (ref 0.8–1.3)
EOSINOPHILS ABSOLUTE: 0 K/UL (ref 0–0.6)
EOSINOPHILS RELATIVE PERCENT: 0 %
GFR SERPL CREATININE-BSD FRML MDRD: >60 ML/MIN/{1.73_M2}
GLUCOSE BLD-MCNC: 137 MG/DL (ref 70–99)
GLUCOSE BLD-MCNC: 154 MG/DL (ref 70–99)
GLUCOSE BLD-MCNC: 167 MG/DL (ref 70–99)
GLUCOSE BLD-MCNC: 197 MG/DL (ref 70–99)
GLUCOSE BLD-MCNC: 200 MG/DL (ref 70–99)
HCT VFR BLD CALC: 38.3 % (ref 40.5–52.5)
HEMOGLOBIN: 12 G/DL (ref 13.5–17.5)
INR BLD: 1.18 (ref 0.87–1.14)
LACTIC ACID: 1 MMOL/L (ref 0.4–2)
LYMPHOCYTES ABSOLUTE: 2.5 K/UL (ref 1–5.1)
LYMPHOCYTES RELATIVE PERCENT: 22.1 %
MAGNESIUM: 1.4 MG/DL (ref 1.8–2.4)
MCH RBC QN AUTO: 24.2 PG (ref 26–34)
MCHC RBC AUTO-ENTMCNC: 31.4 G/DL (ref 31–36)
MCV RBC AUTO: 76.9 FL (ref 80–100)
MONOCYTES ABSOLUTE: 1.1 K/UL (ref 0–1.3)
MONOCYTES RELATIVE PERCENT: 9.5 %
NEUTROPHILS ABSOLUTE: 7.8 K/UL (ref 1.7–7.7)
NEUTROPHILS RELATIVE PERCENT: 68.3 %
PDW BLD-RTO: 16.3 % (ref 12.4–15.4)
PERFORMED ON: ABNORMAL
PHOSPHORUS: 3.9 MG/DL (ref 2.5–4.9)
PLATELET # BLD: 230 K/UL (ref 135–450)
PMV BLD AUTO: 8.1 FL (ref 5–10.5)
POTASSIUM REFLEX MAGNESIUM: 3.7 MMOL/L (ref 3.5–5.1)
POTASSIUM SERPL-SCNC: 3.7 MMOL/L (ref 3.5–5.1)
PREALBUMIN: 10.2 MG/DL (ref 20–40)
PROTHROMBIN TIME: 14.9 SEC (ref 11.7–14.5)
RBC # BLD: 4.98 M/UL (ref 4.2–5.9)
SODIUM BLD-SCNC: 142 MMOL/L (ref 136–145)
TOTAL PROTEIN: 5.1 G/DL (ref 6.4–8.2)
TRANSFERRIN: 120 MG/DL (ref 200–360)
WBC # BLD: 11.5 K/UL (ref 4–11)

## 2022-12-09 PROCEDURE — 1200000000 HC SEMI PRIVATE

## 2022-12-09 PROCEDURE — 85730 THROMBOPLASTIN TIME PARTIAL: CPT

## 2022-12-09 PROCEDURE — 97116 GAIT TRAINING THERAPY: CPT

## 2022-12-09 PROCEDURE — 84466 ASSAY OF TRANSFERRIN: CPT

## 2022-12-09 PROCEDURE — 85025 COMPLETE CBC W/AUTO DIFF WBC: CPT

## 2022-12-09 PROCEDURE — APPNB30 APP NON BILLABLE TIME 0-30 MINS: Performed by: NURSE PRACTITIONER

## 2022-12-09 PROCEDURE — 99024 POSTOP FOLLOW-UP VISIT: CPT | Performed by: SURGERY

## 2022-12-09 PROCEDURE — 97530 THERAPEUTIC ACTIVITIES: CPT

## 2022-12-09 PROCEDURE — 83605 ASSAY OF LACTIC ACID: CPT

## 2022-12-09 PROCEDURE — A4216 STERILE WATER/SALINE, 10 ML: HCPCS | Performed by: SURGERY

## 2022-12-09 PROCEDURE — 2580000003 HC RX 258: Performed by: SURGERY

## 2022-12-09 PROCEDURE — 84100 ASSAY OF PHOSPHORUS: CPT

## 2022-12-09 PROCEDURE — 6360000002 HC RX W HCPCS: Performed by: SURGERY

## 2022-12-09 PROCEDURE — 84134 ASSAY OF PREALBUMIN: CPT

## 2022-12-09 PROCEDURE — 2500000003 HC RX 250 WO HCPCS: Performed by: SURGERY

## 2022-12-09 PROCEDURE — 36415 COLL VENOUS BLD VENIPUNCTURE: CPT

## 2022-12-09 PROCEDURE — 94150 VITAL CAPACITY TEST: CPT

## 2022-12-09 PROCEDURE — 80053 COMPREHEN METABOLIC PANEL: CPT

## 2022-12-09 PROCEDURE — 85610 PROTHROMBIN TIME: CPT

## 2022-12-09 PROCEDURE — 97535 SELF CARE MNGMENT TRAINING: CPT

## 2022-12-09 PROCEDURE — 97166 OT EVAL MOD COMPLEX 45 MIN: CPT

## 2022-12-09 PROCEDURE — C9113 INJ PANTOPRAZOLE SODIUM, VIA: HCPCS | Performed by: NURSE PRACTITIONER

## 2022-12-09 PROCEDURE — 83735 ASSAY OF MAGNESIUM: CPT

## 2022-12-09 PROCEDURE — 6360000002 HC RX W HCPCS: Performed by: INTERNAL MEDICINE

## 2022-12-09 PROCEDURE — 97162 PT EVAL MOD COMPLEX 30 MIN: CPT

## 2022-12-09 PROCEDURE — 6360000002 HC RX W HCPCS: Performed by: NURSE PRACTITIONER

## 2022-12-09 PROCEDURE — 94760 N-INVAS EAR/PLS OXIMETRY 1: CPT

## 2022-12-09 RX ORDER — HYDROMORPHONE HYDROCHLORIDE 1 MG/ML
1 INJECTION, SOLUTION INTRAMUSCULAR; INTRAVENOUS; SUBCUTANEOUS
Status: DISCONTINUED | OUTPATIENT
Start: 2022-12-09 | End: 2022-12-12

## 2022-12-09 RX ORDER — KETOROLAC TROMETHAMINE 15 MG/ML
15 INJECTION, SOLUTION INTRAMUSCULAR; INTRAVENOUS EVERY 6 HOURS
Status: COMPLETED | OUTPATIENT
Start: 2022-12-09 | End: 2022-12-13

## 2022-12-09 RX ORDER — MAGNESIUM SULFATE IN WATER 40 MG/ML
4000 INJECTION, SOLUTION INTRAVENOUS ONCE
Status: COMPLETED | OUTPATIENT
Start: 2022-12-09 | End: 2022-12-10

## 2022-12-09 RX ORDER — PANTOPRAZOLE SODIUM 40 MG/10ML
40 INJECTION, POWDER, LYOPHILIZED, FOR SOLUTION INTRAVENOUS DAILY
Status: DISCONTINUED | OUTPATIENT
Start: 2022-12-09 | End: 2022-12-17 | Stop reason: HOSPADM

## 2022-12-09 RX ORDER — HYDROMORPHONE HYDROCHLORIDE 1 MG/ML
0.5 INJECTION, SOLUTION INTRAMUSCULAR; INTRAVENOUS; SUBCUTANEOUS
Status: DISCONTINUED | OUTPATIENT
Start: 2022-12-09 | End: 2022-12-12

## 2022-12-09 RX ADMIN — PIPERACILLIN AND TAZOBACTAM 3375 MG: 3; .375 INJECTION, POWDER, FOR SOLUTION INTRAVENOUS at 15:05

## 2022-12-09 RX ADMIN — HYDROMORPHONE HYDROCHLORIDE 1 MG: 1 INJECTION, SOLUTION INTRAMUSCULAR; INTRAVENOUS; SUBCUTANEOUS at 22:31

## 2022-12-09 RX ADMIN — Medication 10 ML: at 19:58

## 2022-12-09 RX ADMIN — KETOROLAC TROMETHAMINE 15 MG: 15 INJECTION, SOLUTION INTRAMUSCULAR; INTRAVENOUS at 08:41

## 2022-12-09 RX ADMIN — PANTOPRAZOLE SODIUM 40 MG: 40 INJECTION, POWDER, FOR SOLUTION INTRAVENOUS at 12:09

## 2022-12-09 RX ADMIN — PIPERACILLIN AND TAZOBACTAM 3375 MG: 3; .375 INJECTION, POWDER, FOR SOLUTION INTRAVENOUS at 06:07

## 2022-12-09 RX ADMIN — ENOXAPARIN SODIUM 40 MG: 100 INJECTION SUBCUTANEOUS at 08:40

## 2022-12-09 RX ADMIN — KETOROLAC TROMETHAMINE 15 MG: 15 INJECTION, SOLUTION INTRAMUSCULAR; INTRAVENOUS at 02:55

## 2022-12-09 RX ADMIN — SODIUM CHLORIDE: 9 INJECTION, SOLUTION INTRAVENOUS at 15:00

## 2022-12-09 RX ADMIN — HYDROMORPHONE HYDROCHLORIDE 1 MG: 1 INJECTION, SOLUTION INTRAMUSCULAR; INTRAVENOUS; SUBCUTANEOUS at 14:42

## 2022-12-09 RX ADMIN — PIPERACILLIN AND TAZOBACTAM 3375 MG: 3; .375 INJECTION, POWDER, FOR SOLUTION INTRAVENOUS at 22:35

## 2022-12-09 RX ADMIN — SODIUM CHLORIDE, PRESERVATIVE FREE 10 ML: 5 INJECTION INTRAVENOUS at 12:09

## 2022-12-09 RX ADMIN — HYDROMORPHONE HYDROCHLORIDE 1 MG: 1 INJECTION, SOLUTION INTRAMUSCULAR; INTRAVENOUS; SUBCUTANEOUS at 03:35

## 2022-12-09 RX ADMIN — KETOROLAC TROMETHAMINE 15 MG: 15 INJECTION, SOLUTION INTRAMUSCULAR; INTRAVENOUS at 19:38

## 2022-12-09 RX ADMIN — ONDANSETRON 4 MG: 2 INJECTION INTRAMUSCULAR; INTRAVENOUS at 19:49

## 2022-12-09 RX ADMIN — MORPHINE SULFATE 4 MG: 4 INJECTION, SOLUTION INTRAMUSCULAR; INTRAVENOUS at 01:36

## 2022-12-09 RX ADMIN — HYDROMORPHONE HYDROCHLORIDE 1 MG: 1 INJECTION, SOLUTION INTRAMUSCULAR; INTRAVENOUS; SUBCUTANEOUS at 12:10

## 2022-12-09 RX ADMIN — HYDROMORPHONE HYDROCHLORIDE 1 MG: 1 INJECTION, SOLUTION INTRAMUSCULAR; INTRAVENOUS; SUBCUTANEOUS at 06:03

## 2022-12-09 RX ADMIN — FAMOTIDINE 20 MG: 10 INJECTION INTRAVENOUS at 08:41

## 2022-12-09 RX ADMIN — KETOROLAC TROMETHAMINE 15 MG: 15 INJECTION, SOLUTION INTRAMUSCULAR; INTRAVENOUS at 15:00

## 2022-12-09 RX ADMIN — MAGNESIUM SULFATE HEPTAHYDRATE 4000 MG: 40 INJECTION, SOLUTION INTRAVENOUS at 19:57

## 2022-12-09 ASSESSMENT — PAIN SCALES - GENERAL
PAINLEVEL_OUTOF10: 8
PAINLEVEL_OUTOF10: 8
PAINLEVEL_OUTOF10: 10
PAINLEVEL_OUTOF10: 5
PAINLEVEL_OUTOF10: 8
PAINLEVEL_OUTOF10: 10
PAINLEVEL_OUTOF10: 6
PAINLEVEL_OUTOF10: 10
PAINLEVEL_OUTOF10: 6
PAINLEVEL_OUTOF10: 8
PAINLEVEL_OUTOF10: 8
PAINLEVEL_OUTOF10: 7

## 2022-12-09 ASSESSMENT — PAIN DESCRIPTION - PAIN TYPE
TYPE: SURGICAL PAIN

## 2022-12-09 ASSESSMENT — PAIN DESCRIPTION - LOCATION
LOCATION: ABDOMEN

## 2022-12-09 ASSESSMENT — PAIN DESCRIPTION - DESCRIPTORS: DESCRIPTORS: THROBBING

## 2022-12-09 ASSESSMENT — PAIN DESCRIPTION - ORIENTATION
ORIENTATION: MID
ORIENTATION: MID

## 2022-12-09 ASSESSMENT — PAIN - FUNCTIONAL ASSESSMENT: PAIN_FUNCTIONAL_ASSESSMENT: PREVENTS OR INTERFERES SOME ACTIVE ACTIVITIES AND ADLS

## 2022-12-09 NOTE — PROGRESS NOTES
[facility]        Post-op Note    Pt Name: Aleksander Guidry  MRN: [id number]  YOB: 1960  Date of evaluation: 2022    Post-op Day #1    Subjective:    No acute events overnight  Pain controlled better after adjustment last night  No nausea with NG  No flatus or stool yet    Vital Signs:  Patient Vitals for the past 24 hrs:   BP Temp Temp src Pulse Resp SpO2 Weight   22 0800 107/66 99.7 °F (37.6 °C) Temporal 83 14 96 % --   22 0700 111/65 -- -- 84 14 -- --   22 0600 105/71 -- -- 84 16 94 % --   22 0413 -- 97 °F (36.1 °C) Temporal -- -- 93 % 187 lb (84.8 kg)   22 0400 107/70 -- -- 85 16 -- --   22 0117 -- -- -- 88 23 93 % --   22 0004 122/78 -- -- 90 22 -- --   22 2315 -- 98 °F (36.7 °C) Temporal -- -- 93 % --   22 2200 121/70 -- -- 88 22 94 % --   225 138/75 97.8 °F (36.6 °C) Temporal 85 20 93 % --   22 127/77 -- -- 85 19 95 % --   22 113/64 -- -- 83 13 94 % --   22 2000 120/71 -- -- 83 17 93 % --   22 1950 130/72 -- -- 85 14 100 % --   22 1945 139/74 -- -- 85 24 100 % --   22 194 117/79 -- -- 80 16 98 % --   22 117/79 97.6 °F (36.4 °C) Temporal 83 20 98 % --   22 1701 (!) 165/87 98.3 °F (36.8 °C) Temporal 74 18 94 % --   22 1606 -- -- -- 70 -- -- --   22 1456 (!) 164/87 98.1 °F (36.7 °C) Temporal 74 19 98 % --      TEMPERATURE HISTORY 24H: Temp (24hrs), Av.1 °F (36.7 °C), Min:97 °F (36.1 °C), Max:99.7 °F (37.6 °C)    BLOOD PRESSURE HISTORY: Systolic (43QNW), WCR:962 , Min:105 , VDM:128    Diastolic (58DWX), NXA:04, Min:64, Max:90      Intake/Output:    I/O last 3 completed shifts: In: 1327.8 [I.V.:1235.4; IV Piggyback:92.4]  Out: 6106 [Urine:1600; Blood:120]  No intake/output data recorded.   Drain/tube Output:           Physical Exam:  General: awake, alert, oriented to  person, place, time  Abdomen: soft, non-distended, appropriate incisional tenderness, dressing clean dry and intact    Labs:  CBC:    Recent Labs     12/07/22 1831 12/09/22  0547   WBC 9.7 11.5*   HGB 12.4* 12.0*   HCT 39.7* 38.3*    230     BMP:    Recent Labs     12/07/22 1831 12/09/22  0547    142   K 4.1 3.7    105   CO2 30 27   BUN 11 9   CREATININE 0.7* 0.8   GLUCOSE 188* 200*     Hepatic:   Recent Labs     12/07/22 1831 12/09/22  0547   AST 17 14*   ALT 13 12   BILITOT <0.2 0.5   ALKPHOS 74 50     Amylase: No results for input(s): AMYLASE in the last 72 hours. Lipase:   Recent Labs     12/07/22 1831   LIPASE 30.0     Mag:      Recent Labs     12/09/22 0547   MG 1.40*      Phos:     Recent Labs     12/09/22 0547   PHOS 3.9      Coags:   Recent Labs     12/09/22 0547   INR 1.18*   APTT 32.6       Cultures:  Anaerobic culture  No results for input(s): LABANAE in the last 72 hours. Blood culture  No results for input(s): BC in the last 72 hours. Blood culture 2  No results for input(s): BLOODCULT2 in the last 72 hours. Body fluid culture  No results for input(s): BLOODCULT2 in the last 72 hours. Surgical culture  No results for input(s): CXSURG in the last 72 hours. Fecal occult  No results for input(s): OCCULTBLDFEC in the last 72 hours. Gram stain  No results for input(s): LABGRAM in the last 72 hours. Stool culture 1  No results for input(s): CXST in the last 72 hours. Stool culture 2  No results for input(s): STOOLCULT2 in the last 72 hours. Urine culture  No results for input(s): LABURIN in the last 72 hours. Wound abscess  No results for input(s): WNDABS in the last 72 hours. C Diff   No results for input(s): CDIFFTOXAB in the last 72 hours.       Pathology:   pending    Imaging:  I have personally reviewed the following films:    CT ABDOMEN PELVIS WO CONTRAST Additional Contrast? None    Result Date: 12/3/2022  EXAMINATION: CT OF THE ABDOMEN AND PELVIS WITHOUT CONTRAST 12/1/2022 9:37 pm TECHNIQUE: CT of the abdomen and pelvis was performed without the administration of intravenous contrast. Multiplanar reformatted images are provided for review. Automated exposure control, iterative reconstruction, and/or weight based adjustment of the mA/kV was utilized to reduce the radiation dose to as low as reasonably achievable. COMPARISON: None. HISTORY: ORDERING SYSTEM PROVIDED HISTORY: abd pain TECHNOLOGIST PROVIDED HISTORY: Reason for exam:->abd pain Additional Contrast?->None Decision Support Exception - unselect if not a suspected or confirmed emergency medical condition->Emergency Medical Condition (MA) Reason for Exam: Abd pain. Abdominal Pain (Pt reports sharp abdominal pain onset 4pm today. Reports n/v/d for a few days. Hx DM, wife states pt started trulicity approx 1 month ago and unsure if this is a side effect. States his blood glucose was 130 at home today. ). FINDINGS: Lower Chest:  Visualized portion of the lower chest demonstrates no acute abnormality. There are coronary artery calcifications. Organs: The solid organs are incompletely evaluated without intravenous contrast.  The unenhanced liver, spleen, pancreas, kidneys and adrenal glands are without acute findings. Nonobstructing right nephrolithiasis. A few simple appearing renal cysts are noted. The gallbladder is present without radiopaque cholelithiasis. GI/Bowel: Limited evaluation of the bowel without intravenous contrast. There are multiple dilated loops of small bowel consistent with an acute small bowel obstruction. The bowel is somewhat difficult to follow. There appears to be a high-grade transition point in the midline low abdomen anterior to the iliac bifurcation. The distal small bowel is decompressed. Normal caliber of the colon. Diverticulosis without evidence of acute diverticulitis. Status post right hemicolectomy with an unremarkable appearance of the ileocolic anastomosis. Pelvis:  The urinary bladder is partially distended without contour abnormality. The prostate and seminal vesicles are not well seen secondary to metallic streak artifact from a left total hip arthroplasty. Mildly enlarged bilateral inguinal lymph nodes which measure up to 1.4 cm in short axis. No definite intrapelvic adenopathy within the limitations of streak artifact. Peritoneum/Retroperitoneum: No ascites or pneumoperitoneum. Mild calcified atherosclerotic plaque. No evidence of lymphadenopathy. The aorta and IVC are normal in caliber. Bones/Soft Tissues: No acute bony or soft tissue abnormality. 1. Acute small bowel obstruction with a high grade transition point in the midline low abdomen anterior to the iliac bifurcation. 2. Status post right hemicolectomy. 3. Nonobstructing right nephrolithiasis. 4. Mildly enlarged bilateral inguinal lymph nodes, nonspecific. CT ABDOMEN PELVIS W IV CONTRAST Additional Contrast? None    Result Date: 12/7/2022  EXAMINATION: CT OF THE ABDOMEN AND PELVIS WITH CONTRAST 12/7/2022 7:35 pm TECHNIQUE: CT of the abdomen and pelvis was performed with the administration of intravenous contrast. Multiplanar reformatted images are provided for review. Automated exposure control, iterative reconstruction, and/or weight based adjustment of the mA/kV was utilized to reduce the radiation dose to as low as reasonably achievable. COMPARISON: 12/01/2022 HISTORY: ORDERING SYSTEM PROVIDED HISTORY: abd pain h/o sbo TECHNOLOGIST PROVIDED HISTORY: Reason for exam:->abd pain h/o sbo Additional Contrast?->None Decision Support Exception - unselect if not a suspected or confirmed emergency medical condition->Emergency Medical Condition (MA) Reason for Exam: abd pain h/o sbo FINDINGS: Lower Chest: Subsegmental dependent airspace disease favored to represent atelectasis. No consolidation or spiculated lung mass. No pericardial effusion. No cardiomegaly. Coronary artery atherosclerosis. There is a small hiatal hernia. Minimal fluid in the distal thoracic esophagus suggestive of reflux. Organs: No enhancing mass identified in the liver or spleen. Gallbladder appears unremarkable. No adrenal mass. No pancreatic ductal dilation or acute pancreatic abnormality. Nonobstructive calculus noted in the right kidney. There is mild left-sided hydronephrosis without obstructive calculi. GI/Bowel: There is a high-grade small bowel obstruction noted, with a transition point seen on and around axial image 93 through 110. There is also note of a mild degree of mesenteric swirl seen around this region on coronal imaging, on and around coronal image 63. Interloop fluid. Persistent dilated loops of bowel are seen. Small volume ascites. Postoperative change involving the ascending colon. No colonic obstruction. Pelvis: No pelvic mass. The bladder is unremarkable. Pelvic ascites. Peritoneum/Retroperitoneum: No abdominal aortic aneurysm. No dissection. No retroperitoneal or mesenteric lymphadenopathy. Bones/Soft Tissues: No acute subcutaneous soft tissue abnormality. Postoperative changes in the left hip joint. Multilevel degenerative changes are noted within the spine. No osseous destructive process. Multilevel spinal canal and osseous foraminal stenosis is seen in the lumbar spine. 1. High-grade small bowel obstruction with a focal transition point seen in the mid abdomen as described above, with a mild degree of mesenteric swirling seen on coronal and sagittal imaging, which can be associated with closed loop obstruction as well as possible internal volvulus. 2. Small hiatal hernia with fluid in the distal thoracic esophagus suggestive of reflux. 3. Small volume of reactive ascites. 4. Dependent subsegmental atelectatic changes. XR CHEST PORTABLE    Result Date: 12/2/2022  EXAMINATION: ONE XRAY VIEW OF THE CHEST 12/2/2022 12:02 am COMPARISON: None.  HISTORY: ORDERING SYSTEM PROVIDED HISTORY: NG placement TECHNOLOGIST PROVIDED HISTORY: Reason for exam:->NG placement Reason for Exam:  NG placement FINDINGS: There is a nasogastric tube in place with the tip just within the stomach. The gas pattern is unremarkable. Gastric tube tip probably just within the stomach. Recommend readjusting the tube tip into the distal stomach for more physiologic positioning. Nonspecific gas pattern. XR ABDOMEN FOR NG/OG/NE TUBE PLACEMENT    Result Date: 12/8/2022  EXAMINATION: ONE SUPINE XRAY VIEW(S) OF THE ABDOMEN 12/7/2022 11:52 pm COMPARISON: 12/05/2022 HISTORY: ORDERING SYSTEM PROVIDED HISTORY: Confirmation of course of NG/OG/NE tube and location of tip of tube TECHNOLOGIST PROVIDED HISTORY: Reason for exam:->Confirmation of course of NG/OG/NE tube and location of tip of tube Portable? ->Yes Reason for Exam: Confirmation of course of NG/OG/NE tube and location of tip of tube FINDINGS: The enteric catheter is coiled back upon itself in the distal thoracic esophagus, with the tip directed cephalad. Cardiac size is at the upper limits of normal.  Vascular congestion. The enteric catheter tip is coiled upon itself in the distal thoracic esophagus with the tip directed cephalad. FL SMALL BOWEL FOLLOW THROUGH ONLY    Result Date: 12/5/2022  EXAMINATION: SMALL BOWEL FOLLOW THROUGH SERIES 12/4/2022 TECHNIQUE: Small bowel follow through series was performed with overhead images and spot images. FLUOROSCOPY DOSE AND TYPE OR TIME AND EXPOSURES: 8 images were obtained. COMPARISON: None HISTORY: ORDERING SYSTEM PROVIDED HISTORY: sbo TECHNOLOGIST PROVIDED HISTORY: Reason for exam:->sbo Reason for Exam: sbo FINDINGS:  image of the abdomen demonstrates mildly distended small bowel. Stool noted in the colon. Moderate lumbar degenerative changes identified. Left hip arthroplasty. Severe arthrosis right hip. The stomach and duodenum are normal.  Normal caliber and appearance of the jejunum.   Moderately dilated ileum identified measuring up to 4.6 cm.  Small bowel transit time is delayed. Contrast is noted in the colon after 4 hours. The colon is not dilated. Findings are highly suggestive of partial small bowel obstruction, with delayed small bowel transit time. XR ABDOMEN (2 VIEWS)    Result Date: 12/5/2022  EXAMINATION: TWO XRAY VIEWS OF THE ABDOMEN 12/5/2022 9:47 am COMPARISON: 12/04/2022 HISTORY: ORDERING SYSTEM PROVIDED HISTORY: SBO TECHNOLOGIST PROVIDED HISTORY: Reason for exam:->SBO Reason for Exam: SBO FINDINGS: Contrast is seen throughout the length of the colon. Air-fluid levels are seen on the upright view. Some contrast is seen within small bowel though the degree of small-bowel contrast has decreased as compared to prior. Small bowel loops are dilated. Persistent though decreased contrast within dilated small bowel as compared to prior with increased contrast in the colon; the findings can be in keeping with partial small bowel obstruction. XR ABDOMEN (2 VIEWS)    Result Date: 12/3/2022  EXAMINATION: TWO XRAY VIEWS OF THE ABDOMEN 12/3/2022 5:08 pm COMPARISON: CT abdomen and pelvis 12/01/2022. HISTORY: ORDERING SYSTEM PROVIDED HISTORY: SBO TECHNOLOGIST PROVIDED HISTORY: Reason for exam:->SBO Reason for Exam: SBO FINDINGS: The tip of the enteric tube is in the gastric fundus. The side port is at the level of the gastroesophageal junction. Air-filled distended loops of small bowel are seen in the left abdomen with air-fluid levels on the upright view. No evidence of pneumoperitoneum. No acute osseous abnormality. 1. Air-filled distended loops of small bowel in the left abdomen with air-fluid levels on the upright view compatible with an obstruction. 2. Tip of the enteric tube in the gastric fundus and side-port at the level of the gastroesophageal junction. Consider advancement.          Scheduled Meds:   ketorolac  15 mg IntraVENous Q6H    pantoprazole  40 mg IntraVENous Daily    insulin lispro  0-4 Units SubCUTAneous TID WC    insulin lispro  0-4 Units SubCUTAneous Nightly    sodium chloride flush  5-40 mL IntraVENous 2 times per day    enoxaparin  40 mg SubCUTAneous Daily    piperacillin-tazobactam  3,375 mg IntraVENous Q8H     Continuous Infusions:   dextrose      sodium chloride      lactated ringers 0 mL (12/08/22 1700)    sodium chloride      sodium chloride 125 mL/hr at 12/08/22 2159     PRN Meds:. HYDROmorphone **OR** HYDROmorphone, dextrose bolus **OR** dextrose bolus, glucagon (rDNA), dextrose, sodium chloride flush, sodium chloride, ondansetron **OR** ondansetron, polyethylene glycol, acetaminophen **OR** acetaminophen, phenol, sodium chloride, bisacodyl, oxyCODONE **OR** oxyCODONE, acetaminophen      Assessment:  Patient Active Problem List   Diagnosis    SBO (small bowel obstruction) ([facility] [address]     OR Date 12/8/22, Exploratory laparotomy, extensive lysis of adhesions (75 minutes), small bowel and ascending colon resection with anastomosis  Diabetes    Plan:  1. Await return of bowel function, no flatus or stool yet, at risk for ileus with large amount of adhesiolysis  2. Bowel rest NG decompression until bowel function returns  3. IVF, monitor and replace electrolytes as needed  4. Pain control, minimize narcotics, continue toradol, changed to dilaudid, if not controlled can switch to PCA  5. Activity as able  6. Antibiotics  8. Good urine output, remove vicente  9. Defer management of remainder of medical comorbidities to primary and consulting teams    Rey Gordon MD, FACS  12/9/2022  11:11 AM

## 2022-12-09 NOTE — PROGRESS NOTES
Physician Progress Note      PATIENT:               Rito Veliz  [facility] #:                  [id number]  :                       1960  ADMIT DATE:       2022 6:09 PM  [address] DATE:  RESPONDING  PROVIDER #:        Alfonso Holt MD          QUERY TEXT:    Pt admitted with sbo/volvulus and underwent small bowel resection and   ascending colon resection with extensive lysis of peritoneal adhesions, noted   documentation of previous partial colectomy ten years ago. ? If possible,   please document in progress notes and discharge summary the relationship if   any between the adhesions and the surgery: The medical record reflects the following:  Risk Factors: 59 yo with previous partial colectomy ten years ago    Clinical Indicators: Gen Surg , Surgical history includes a partial   colectomy for a large polyp approximately 10 years ago. Several years after   developed small bowel obstruction and recovered with medical management and   another several years ago. Gen surg plan , The patient has a small bowel obstruction that is likely   from adhesive disease. Benefits include rapid resolution of the adhesions   causing recurrent obstructions and hopeful prevention of future episodes. OP note, Even doing this, I was able to enter the peritoneum, but incredibly   dense adhesions were noted stuck to the anterior abdominal wall along the   entire length of the midline incision. Treatment: small bowel resection and ascending colon resection with extensive   lysis of peritoneal adhesions (75 min. OP note), pain meds, imaging, NPO  Options provided:  -- Peritoneal adhesions due to previous procedure  -- Peritoneal adhesion is not due to the procedure, but is due to other   incidental risk factor, Please specify other incidental risk factor.   -- Other - I will add my own diagnosis  -- Disagree - Not applicable / Not valid  -- Disagree - Clinically unable to determine / Unknown  -- Refer to Clinical Documentation Reviewer    PROVIDER RESPONSE TEXT:    Patient has Peritoneal adhesions due to previous procedure. Query created by:  Debbie Barrera on 12/9/2022 12:16 PM      Electronically signed by:  Pat Hilliard MD 12/9/2022 3:58 PM

## 2022-12-09 NOTE — PROGRESS NOTES
Pt arrived to PACU from OR, VSS, pt arouses to voice. Mid-abd incision notes small amount of bloody drainage, ice applied and drainage outlined. Loera catheter notes yellow, clear urine. Will continue to monitor.

## 2022-12-09 NOTE — BRIEF OP NOTE
[facility]  Brief Operative Note  Pt Name: Allan Haas  CSN: [id number]  YOB: 1960    Date of Procedure: 12/8/2022    Pre-operative Diagnosis:  Small bowel obstruction    Post-operative Diagnosis: same     Procedure:  Exploratory laparotomy, extensive lysis of adhesions (75 minutes), small bowel and ascending colon resection with anastomosis    Surgeon(s):  Trev Nielsen MD     Surgical Assistant: Sheire Clarke    Anesthesia:  General anesthesia    Findings: Full note dictated, Dictation Job Number: [id number]    Estimated Blood Loss: less than 160  ml    Complications: None    Specimens:  small bowel and ascending colon  ID Type Source Tests Collected by Time Destination   A : A) SMALL BOWEL AND RIGHT COLON Tissue Tissue SURGICAL PATHOLOGY Trev Nielsen MD 12/8/2022 1904       Implants:  * No implants in log *    Drains: none   NG/OG/NJ/NE Tube Nasogastric 18 fr Right nostril (Active)   Surrounding Skin Intact 12/07/22 2343   Securement device Adhesive based patel 12/07/22 2343       Urinary Catheter 12/08/22 Loera (Active)       [REMOVED] NG/OG/NJ/NE Tube Nasogastric 16 fr Left nostril (Removed)   Surrounding Skin Clean, dry & intact 12/05/22 2312   Securement device Tape 12/05/22 2312   Status Suction-low continuous 12/05/22 2312   Placement Verified X-Ray (Initial) 12/02/22 0200   NG/OG/NJ/NE External Measurement (cm) 64 cm 12/05/22 2312   Drainage Appearance Green;Brown 12/05/22 2312   Free Water/Flush (mL) 30 mL 12/04/22 2138   Output (mL) 1100 ml 12/04/22 2138       Condition: stable     Disposition and Post-operative plan: PACU, med/surg sweeney     Rey Tyson MD, FACS  12/8/2022  7:40 PM

## 2022-12-09 NOTE — OP NOTE
[facility]                     [address] [facility], [address] [place] [address]                                OPERATIVE REPORT    PATIENT NAME: Tracey Victor                    :        1960  MED REC NO:   [id number]                          ROOM:       5902  ACCOUNT NO:   [de-identified]                           ADMIT DATE: 2022  PROVIDER:     Paula Smith MD    DATE OF PROCEDURE:  2022    PREOPERATIVE DIAGNOSIS:  Small bowel obstruction. POSTOPERATIVE DIAGNOSIS:  Small bowel obstruction. OPERATION PERFORMED:  Exploratory laparotomy, extensive lysis of  adhesions of approximately 75 minutes, small bowel and ascending colon  resection with anastomosis. SURGEON:  Paula Smith MD    ANESTHESIA:  General.    INDICATIONS:  This is a 67-year-old male who presents with recurrent  small bowel obstruction. He was recently admitted for this obstruction,  which recovered with conservative management, but the patient returned  less than a day after discharge with recurrent symptoms. With failure  of medical management, the patient would need exploration with lysis of  adhesions. The risks, benefits, and alternative treatments of an  exploratory laparotomy with lysis of adhesions and possible bowel  resection were discussed. Details of the procedure were discussed. All  questions were answered. The patient understood, agreed, and wished to  proceed. OPERATIVE PROCEDURE:  The patient was brought to the operating suite and  laid in the supine position. General anesthesia was induced and well  tolerated. The patient's abdomen was prepped and draped in the usual  sterile fashion. After proper time-out was performed, verifying the  operative site, procedure, and the patient, a generous midline incision  was made with a scalpel extending superiorly and inferiorly to the  patient's prior incision to hopefully minimize risk of enterotomy on  entry to the peritoneum. This was deepened through the subcutaneous  tissue through the fascia into the peritoneum and epigastrium. On  review of the preoperative CT, this will be the safest location to the  antrum. Even doing this, I was able to enter the peritoneum, but  incredibly dense adhesions were noted stuck to the anterior abdominal  wall along the entire length of the midline incision. With that careful  tedious dissection, usually mostly blunt and sharp dissection, but a  careful amount of cautery, the small bowel was dissected off the  anterior abdominal wall and the entirety of the fascia was opened. On  further exploration of the abdomen, there was an intense amount of  adhesions to other loops as well as to the posterior retroperitoneum  that took approximately an hour and fifteen minutes to completely lyse. At this point, the entirety of the small bowel was freed and I examined  from the ligament of Treitz all the way to transverse colon. Approximately 250 cm distal to the ligament of Treitz, the bowel looked  normal and uninvolved with the adhesion process. Distal to this, there  were multiple serosal tears, but no enterotomies noted and this was due  to the intense adhesions and adhesiolysis that was unavoidable. The  ascending colon appeared to be affected by the same inflammatory and  adhesive process, but the transverse colon was uninvolved as was the  descending colon and the sigmoid, which were protected from harm. I  felt the safest resection to do at this point would be small bowel  resection, leaving the 250 cm of proximal small bowel and the ascending  colon anastomosing small bowel to transverse colon, as these two ends of  the bowel were relatively uninvolved and would be a healthy tissue. The  bowel just proximal to the adhesive process in the small bowel was  divided with the blue load of the Theresa stapler and the transverse  colon divided with the blue load of the Theresa stapler.   The mesentery  of the bowel was divided with the LigaSure Impact device and the  specimen was passed off to Pathology for further analysis. The wound  was inspected for hemostasis, which had been obtained with pressure and  cautery. The abdomen was irrigated and suctioned, again hemostasis was  verified. The small bowel and transverse colon were lined with multiple  interrupted 3-0 silk sutures. The corners of the staple lines were cut  and common anastomosis was created within another blue load of the  [place] stapler, creating a side-to-side functional end-to-end  anastomosis. The common enterotomy was then sealed with the blue load  of the TX 60 stapler. The anastomosis was widely patent and hemostatic  and the bowel was returned to the abdomen taking care not to twist the  mesentery. The stomach was palpated and the NG was noted to be within  the stomach as well. Again, hemostasis was verified. The fascia was  closed with two looped 0 PDS sutures. The subcutaneous tissues were  irrigated and the skin was closed loosely with staples. The wound was  then cleaned and dressed with gauze and tape. The patient tolerated the  procedure well and was transferred to the PACU in stable condition. SPECIMENS:  Small bowel and ascending colon resection. DRAINS:  None. COMPLICATIONS:  None. ESTIMATED BLOOD LOSS:  Approximately 100 mL.         Guillermina Rodríguez MD    D: 12/08/2022 19:53:36       T: 12/09/2022 4:17:31     [email or web address]  Job#: [id number]     Doc#: [id number]    CC:

## 2022-12-09 NOTE — PROGRESS NOTES
Pt to room after surgery. VSS. Abd dressing with x2 area of bleeding, marked by PACU nurse, ice applied. Loera in place. Restart NG tube intermittent to wall suction.

## 2022-12-09 NOTE — PROGRESS NOTES
Hospitalist Progress Note      PCP: Jacqueline Villarreal DO, DO    Date of Admission: 12/7/2022    Chief Complaint: Abd pain      Subjective:   S/p OR for small bowel and ascending colon resection with anastomosis yesterday. Tolerated well. Has post op pain. Otherwise denies new acute complaints. Medications:  Reviewed    Infusion Medications    dextrose      sodium chloride      sodium chloride      sodium chloride 125 mL/hr at 12/09/22 1500     Scheduled Medications    ketorolac  15 mg IntraVENous Q6H    pantoprazole  40 mg IntraVENous Daily    insulin lispro  0-4 Units SubCUTAneous TID WC    insulin lispro  0-4 Units SubCUTAneous Nightly    sodium chloride flush  5-40 mL IntraVENous 2 times per day    enoxaparin  40 mg SubCUTAneous Daily    piperacillin-tazobactam  3,375 mg IntraVENous Q8H     PRN Meds: HYDROmorphone **OR** HYDROmorphone, dextrose bolus **OR** dextrose bolus, glucagon (rDNA), dextrose, sodium chloride flush, sodium chloride, ondansetron **OR** ondansetron, polyethylene glycol, acetaminophen **OR** acetaminophen, phenol, sodium chloride, bisacodyl, oxyCODONE **OR** oxyCODONE      Intake/Output Summary (Last 24 hours) at 12/9/2022 1535  Last data filed at 12/9/2022 1511  Gross per 24 hour   Intake 2156.37 ml   Output 1920 ml   Net 236.37 ml       Exam:    /79   Pulse 87   Temp 99.7 °F (37.6 °C) (Temporal)   Resp 15   Ht 5' 11\" (1.803 m)   Wt 187 lb (84.8 kg)   SpO2 96%   BMI 26.08 kg/m²     Gen/overall appearance: Not in acute distress. Alert. Head: Normocephalic, atraumatic  Eyes: EOMI, no scleral icterus  CVS: regular rate and rhythm, Normal S1S2  Pulm: Clear to auscultation bilaterally. No crackles/wheezes  Gastrointestinal: Soft, surgical dresssing, no guarding or rebound  Extremities: No edema.  No erythema or warmth  Neuro: No gross focal deficits noted  Skin: Warm, dry    Labs:   Recent Labs     12/07/22  1831 12/09/22  0547   WBC 9.7 11.5*   HGB 12.4* 12.0*   HCT 39.7* 38.3*    230     Recent Labs     12/07/22  1831 12/09/22  0547    142   K 4.1 3.7  3.7    105   CO2 30 27   BUN 11 9   CREATININE 0.7* 0.8   CALCIUM 9.5 7.8*   PHOS  --  3.9     Recent Labs     12/07/22  1831 12/09/22  0547   AST 17 14*   ALT 13 12   BILITOT <0.2 0.5   ALKPHOS 74 50     Recent Labs     12/09/22  0547   INR 1.18*     No results for input(s): Julieta Bundy in the last 72 hours.     Assessment/Plan:    Active Hospital Problems    Diagnosis Date Noted    SBO ([facility] ([facility] [K56.609] 12/01/2022     Priority: Medium       High Grade Small Bowel Obstruction s/p  small bowel/ascending colon resection with anastomosis 12/8/22  NG  NPO   IVF fluid hydration  Trend lytes and Cr  Pain management  Post op care per GS     Type II DM controlled  Hold oral agents  Sliding scale insulin    Hyperlipidemia  Hold statin for now    DVT Prophylaxis: lovenox  Diet: Diet NPO Exceptions are: Sips of Water with Meds  Code Status: Full Code      Pia Lanza MD

## 2022-12-09 NOTE — PROGRESS NOTES
[facility] Department   Phone: [phone]    Occupational Therapy    [x] Initial Evaluation            [] Daily Treatment Note         [] Discharge Summary      Patient: Aaron Reyna   : 1960   MRN: [id number]   Date of Service:  2022    Admitting Diagnosis:  SBO (small bowel obstruction) ([address]  Current Admission Summary: SBO (small bowel obstruction) ([address] Exp lap, lysis of adhesions, small  bowel and ascending colon resection with anastomosis on 22  Past Medical History:  has a past medical history of DM (diabetes mellitus) ([address] HLD (hyperlipidemia), and Tubulovillous adenoma of colon. Past Surgical History:  has a past surgical history that includes hemicolectomy and colectomy (N/A, 2022). Discharge Recommendations: Aaron Reyna scored a 15/24 on the AM-PAC ADL Inpatient form. Current research shows that an AM-PAC score of 17 or less is typically not associated with a discharge to the patient's home setting. Based on the patient's AM-PAC score and their current ADL deficits, it is recommended that the patient have 5-7 sessions per week of Occupational Therapy at d/c to increase the patient's independence. At this time, this patient demonstrates complex nursing, medical, and rehabilitative needs, and would benefit from intensive rehabilitation services upon discharge from the Inpatient setting. This patient demonstrates the ability to participate in and benefit from an intensive therapy program with a coordinated interdisciplinary team approach to foster frequent, structured, and documented communication among disciplines, who will work together to establish, prioritize, and achieve treatment goals. Please see assessment section for further patient specific details. If patient discharges prior to next session this note will serve as a discharge summary. Please see below for the latest assessment towards goals.        DME Required For Discharge: DME to be determined pending patient progress    Precautions/Restrictions: high fall risk  Weight Bearing Restrictions: no restrictions  [] Right Upper Extremity  [] Left Upper Extremity [] Right Lower Extremity  [] Left Lower Extremity     Required Braces/Orthotics: no braces required   [] Right  [] Left  Positional Restrictions:no positional restrictions    Pre-Admission Information   Lives With: spouse                  Type of Home: condo, senior living  Home Layout: one level  Home Access: level entry  [employer]: wheelchair accessible, walk in shower  Bathroom Equipment: grab bars in shower, grab bars around toilet, built in shower seat, hand held shower head  Toilet Height: elevated height  Home Equipment: rolling walker  Transfer Assistance: Independent without use of device  Ambulation Assistance:Independent without use of device  ADL Assistance: independent with all ADL's  IADL Assistance: independent with homemaking tasks  Active :        [x] Yes                 [] No  Hand Dominance: [] Left                 [x] Right  Current Employment: full time employment. 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 Spouse also present. Pt is pleasant and agreeable to PT/OT evaluations. NG tube disconnected at beginning of session and reconnected to suction at EOS. Nurse notified.  Pt with x1 instance of nausea and dizziness; however, spouse indicated pt often becomes dizzy if navigating environment without his glasses. Pain: 8/10. Location: Abdomen at surgical site. Pain Interventions: pain medication in place prior to arrival and repositioned         Activities of Daily Living  Basic Activities of Daily Living  Grooming: contact guard assistance Comment: Pt completed brushing teeth and washing hands at sink--able to initiate brushing teeth with set up but only able to tolerate ~2-3 min in stance before completing remainder of activity in sitting at [facility]. Lower Extremity Dressing: minimal assistance maximum assistance Comment: Max A for threading breifs in sitting with additional help with vicente management. Min A for pulling knees to hips in dynamic stance with adjustment. Comment: Increased time and verbal cuing required for all ADLs due to slow pacing. Instrumental Activities of Daily Living  No IADL completed on this date. Functional Mobility  Bed Mobility  Supine to Sit: minimal assistance  Rolling Right: minimal assistance  Scooting: minimal assistance  Comments:  Transfers  Sit to stand transfer:contact guard assistance  Stand to sit transfer: contact guard assistance  Toilet transfer: contact guard assistance, BSC x2 with increased cues for hand placement/transfer mechanics. Comments: RW for UE support at all surface: sit<>stand at EOB x1; BSC x2; recliner x1  Functional Mobility:  Sitting Balance: stand by assistance. Standing Balance: contact guard assistance, pt able to tolerate 2 min + 3 min in stance during ambulation, transfers and ADL completion. Forward flexed posture--likely related to abdominal pain/guarding. Functional Mobility: .  contact guard assistance, Pt ambulated 12' + 35' to/from bathroom and in room with RW. Decreased step length and cheryl. Note: Pt with baseline R AFO (does not currently have with him). See PT notes for further details regarding gait quality.     Other Therapeutic Interventions    Functional Outcomes  AM-PAC Inpatient Daily Activity Raw Score: 15    Cognition  WFL  Orientation:    alert and oriented x 4  Command Following:   [facility]     Education  Barriers To Learning: none  Patient Education: patient educated on goals, OT role and benefits, plan of care, precautions, ADL adaptive strategies, family education, disease specific education  Learning Assessment:  patient verbalizes understanding, would benefit from continued reinforcement    Assessment  Activity Tolerance: Pt limited by dizziness x1 episode and fatigue. Impairments Requiring Therapeutic Intervention: decreased functional mobility, decreased ADL status, decreased strength, decreased safety awareness, decreased endurance, decreased balance, increased pain  Prognosis: good  Clinical Assessment: Pt presenting below his functional baseline with the above deficits associated with SBO with bowel resection. He is primarily limited by fatigue and weakness. Continued OT indicated in order to maximize safety and independence with all occupational pursuits.    Safety Interventions: patient left in chair, chair alarm in place, call light within reach, patient at risk for falls, nurse notified, and family/caregiver present    Plan  Frequency: 3-5 x/per week  Current Treatment Recommendations: strengthening, balance training, functional mobility training, transfer training, gait training, endurance training, neuromuscular re-education, ADL/self-care training, and IADL training    Goals  Patient Goals: Return to home   Short Term Goals:  Time Frame: Discharge  Patient will complete upper body ADL at modified independent   Patient will complete lower body ADL at modified independent   Patient will complete toileting at modified independent   Patient will complete grooming at modified independent   Patient will complete functional transfers at modified independent     Therapy Session Time     Individual Group Co-treatment   Time In    0855   Time Out    1004   Minutes    69      Timed Code Treatment Minutes: 47 Minutes  Total Treatment Minutes:  69 minutes       Electronically Signed By: NAE Boyce OTR/L  [id number]

## 2022-12-09 NOTE — ANESTHESIA POSTPROCEDURE EVALUATION
Department of Anesthesiology  Postprocedure Note    Patient: Steven Valenzuela  MRN: [id number]  YOB: 1960  Date of evaluation: 12/8/2022      Procedure Summary     Date: 12/08/22 Room / Location: [facility]    Anesthesia Start: 1728 Anesthesia Stop: 1945    Procedure: EXPLORATORY LAPAROTOMY LYSIS OF ADHESIONS SMALL BOWEL RESECTION (Abdomen) Diagnosis:       Small bowel obstruction ([address]      (Small Bowel Obstruction)    Surgeons: Lincoln Cornelius MD Responsible Provider: Jhoana Hernandez MD    Anesthesia Type: general ASA Status: 2 - Emergent          Anesthesia Type: No value filed.     Valentina Phase I: Valentina Score: 8    Valentina Phase II:        Anesthesia Post Evaluation    Patient location during evaluation: PACU  Patient participation: complete - patient participated  Level of consciousness: awake and alert  Airway patency: patent  Nausea & Vomiting: no nausea and no vomiting  Complications: no  Cardiovascular status: hemodynamically stable  Respiratory status: acceptable  Hydration status: stable  Multimodal analgesia pain management approach

## 2022-12-09 NOTE — PROGRESS NOTES
Phase 1 complete, pt seen by anesthesiologist. VSS, pt resting comfortably. Incision site notes moderate amount of drainage, ice applied. Will transfer to [facility].

## 2022-12-09 NOTE — PROGRESS NOTES
[facility] [address]   Phone: [phone]    Physical Therapy    [x] Initial Evaluation            [] Daily Treatment Note         [] Discharge Summary      Patient: Maximilian Sawyer   : 1960   MRN: [id number]   Date of Service:  2022  Admitting Diagnosis: SBO (small bowel obstruction) ([facility] [address] Exp lap, lysis of adhesions, small  bowel and ascending colon resection with anastomosis on 22  Current Admission Summary: This is a 78-year-old male who presents with recurrent  small bowel obstruction. He was recently admitted for this obstruction,  which recovered with conservative management, but the patient returned  less than a day after discharge with recurrent symptoms. With failure  of medical management, the patient would need exploration with lysis of  adhesions. Past Medical History:  has a past medical history of DM (diabetes mellitus) ([facility] [address] HLD (hyperlipidemia), and Tubulovillous adenoma of colon. Past Surgical History:  has a past surgical history that includes hemicolectomy and colectomy (N/A, 2022). Discharge Recommendations: Maximilian Sawyer scored a 14/24 on the AM-PAC short mobility form. Current research shows that an AM-PAC score of 17 or less is typically not associated with a discharge to the patient's home setting. Based on the patient's AM-PAC score and their current functional mobility deficits, it is recommended that the patient have 5-7 sessions per week of Physical Therapy at d/c to increase the patient's independence. At this time, this patient demonstrates complex nursing, medical, and rehabilitative needs, and would benefit from intensive rehabilitation services upon discharge from the Inpatient setting.   This patient demonstrates the ability to participate in and benefit from an intensive therapy program with a coordinated interdisciplinary team approach to foster frequent, structured, and documented communication among disciplines, who will work together to establish, prioritize, and achieve treatment goals. Please see assessment section for further patient specific details. If patient discharges prior to next session this note will serve as a discharge summary. Please see below for the latest assessment towards goals. DME Required For Discharge: rolling walker, bedrail  Precautions/Restrictions: high fall risk    Required Braces/Orthotics: no braces required, but pt wears an AFO about 50% of the time, mostly at work; does not have here at the hospital   [x] Right  [] Left  Pt reports that he normally wears R AFO, related to old ankle injury  Positional Restrictions:no positional restrictions, pt instructed in log roll     Pre-Admission Information   Lives With: spouse    Type of Home: condo, senior living  Home Layout: one level  Home Access: level entry  [employer]: wheelchair accessible, walk in shower  Bathroom Equipment: grab bars in shower, grab bars around toilet, built in shower seat, hand held shower head  Toilet Height: elevated height  Home Equipment: rolling walker  Transfer Assistance: Independent without use of device  Ambulation Assistance:Independent without use of device  ADL Assistance: independent with all ADL's  IADL Assistance: independent with homemaking tasks  Active :        [x] Yes  [] No  Hand Dominance: [] Left  [x] Right  Current Employment: full time employment.   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 Location: Abdomen, surgical  Pain Interventions: pain medication in place prior to arrival       Functional Mobility  Bed Mobility  Supine to Sit: minimal assistance  Rolling Right: minimal assistance  Scooting: contact guard assistance  Comments: discussed log roll technique; HOB slightly elevated, bedrail  Transfers  Sit to stand transfer: contact guard assistance  Stand to sit transfer: contact guard assistance  Comments: with RW from bed, BSC x 2 while seated at sink for ADLs; slow movements due to pain  Ambulation  Assistive Device: rolling walker  Assistance: contact guard assistance  Distance: 12 ft, 35 ft  Gait Mechanics: Pt amb with flexed posture, cues to stand more upright; decreased step lengths and cheryl; adequate foot clearance without AFO  Comments:  amb EOB to sink; ADLs standing for short time, then seated on commode for rest break  Stair Mobility  Stair mobility not completed on this date.   Comments:  Balance  Static Sitting Balance: fair (+): maintains balance at SBA/supervision without use of UE support  Dynamic Sitting Balance: fair (+): maintains balance at SBA/supervision without use of UE support  Static Standing Balance: fair (-): maintains balance at CGA with use of UE support  Dynamic Standing Balance: fair (-): maintains balance at CGA with use of UE support  Comments:    Other Therapeutic Interventions    Functional Outcomes  AM-PAC Inpatient Mobility Raw Score : 14              Cognition  WFL  Orientation:    alert and oriented x 4  Command Following:   [facility]    Education  Barriers To Learning: none  Patient Education: patient educated on goals, PT role and benefits, plan of care, functional mobility training, proper use of assistive device/equipment, discharge recommendations  Learning Assessment:  patient verbalizes understanding, would benefit from continued reinforcement    Assessment  Activity Tolerance: Pt tolerated evaluation well; rest breaks required due to pain and fatigue; some lightheadedness with ambulation  Impairments Requiring Therapeutic Intervention: decreased functional mobility, decreased ROM, decreased strength, decreased endurance, increased pain, decreased posture  Prognosis: excellent  Clinical Assessment:  Pt is limited by the above deficits and would benefit from skilled PT services to maximize pt functional mobility and safety prior to discharge. Pt is below baseline, limited by post op pain today. Anticipate that pt will progress with mobility daily.      Safety Interventions: patient left in chair, chair alarm in place, call light within reach, gait belt, patient at risk for falls, nurse notified, and family/caregiver present    Plan  Frequency: 3-5 x/per week  Current Treatment Recommendations: strengthening, ROM, balance training, functional mobility training, transfer training, gait training, and patient/caregiver education    Goals  Patient Goals: return home   Short Term Goals:  Time Frame: Discharge  Patient will complete bed mobility at [facility] independent   Patient will complete transfers at supervision   Patient will ambulate 150 ft with use of rolling walker at supervision    Therapy Session Time      Individual Group Co-treatment   Time In     0855   Time Out     1004   Minutes     69     Timed Code Treatment Minutes:  54 Minutes  Total Treatment Minutes:  69       Electronically Signed By: Dahlia Galaviz 30, [id number]

## 2022-12-10 LAB
A/G RATIO: 1 (ref 1.1–2.2)
ALBUMIN SERPL-MCNC: 2.7 G/DL (ref 3.4–5)
ALP BLD-CCNC: 68 U/L (ref 40–129)
ALT SERPL-CCNC: 14 U/L (ref 10–40)
ANION GAP SERPL CALCULATED.3IONS-SCNC: 10 MMOL/L (ref 3–16)
ANISOCYTOSIS: ABNORMAL
AST SERPL-CCNC: 29 U/L (ref 15–37)
BANDED NEUTROPHILS RELATIVE PERCENT: 10 % (ref 0–7)
BASOPHILS ABSOLUTE: 0 K/UL (ref 0–0.2)
BASOPHILS RELATIVE PERCENT: 0 %
BILIRUB SERPL-MCNC: 0.6 MG/DL (ref 0–1)
BUN BLDV-MCNC: 16 MG/DL (ref 7–20)
BURR CELLS: ABNORMAL
CALCIUM SERPL-MCNC: 8.2 MG/DL (ref 8.3–10.6)
CHLORIDE BLD-SCNC: 105 MMOL/L (ref 99–110)
CO2: 24 MMOL/L (ref 21–32)
CREAT SERPL-MCNC: 0.8 MG/DL (ref 0.8–1.3)
EOSINOPHILS ABSOLUTE: 0.1 K/UL (ref 0–0.6)
EOSINOPHILS RELATIVE PERCENT: 1 %
GFR SERPL CREATININE-BSD FRML MDRD: >60 ML/MIN/{1.73_M2}
GLUCOSE BLD-MCNC: 124 MG/DL (ref 70–99)
GLUCOSE BLD-MCNC: 128 MG/DL (ref 70–99)
GLUCOSE BLD-MCNC: 150 MG/DL (ref 70–99)
GLUCOSE BLD-MCNC: 152 MG/DL (ref 70–99)
GLUCOSE BLD-MCNC: 164 MG/DL (ref 70–99)
HCT VFR BLD CALC: 34.1 % (ref 40.5–52.5)
HEMOGLOBIN: 10.7 G/DL (ref 13.5–17.5)
LYMPHOCYTES ABSOLUTE: 2.7 K/UL (ref 1–5.1)
LYMPHOCYTES RELATIVE PERCENT: 21 %
MAGNESIUM: 2.4 MG/DL (ref 1.8–2.4)
MCH RBC QN AUTO: 24.4 PG (ref 26–34)
MCHC RBC AUTO-ENTMCNC: 31.4 G/DL (ref 31–36)
MCV RBC AUTO: 77.6 FL (ref 80–100)
METAMYELOCYTES RELATIVE PERCENT: 1 %
MONOCYTES ABSOLUTE: 1.5 K/UL (ref 0–1.3)
MONOCYTES RELATIVE PERCENT: 12 %
NEUTROPHILS ABSOLUTE: 8.4 K/UL (ref 1.7–7.7)
NEUTROPHILS RELATIVE PERCENT: 55 %
PDW BLD-RTO: 16.4 % (ref 12.4–15.4)
PERFORMED ON: ABNORMAL
PHOSPHORUS: 2.2 MG/DL (ref 2.5–4.9)
PLATELET # BLD: 197 K/UL (ref 135–450)
PMV BLD AUTO: 8.1 FL (ref 5–10.5)
POTASSIUM REFLEX MAGNESIUM: 4.1 MMOL/L (ref 3.5–5.1)
RBC # BLD: 4.39 M/UL (ref 4.2–5.9)
SODIUM BLD-SCNC: 139 MMOL/L (ref 136–145)
TOTAL PROTEIN: 5.5 G/DL (ref 6.4–8.2)
WBC # BLD: 12.8 K/UL (ref 4–11)

## 2022-12-10 PROCEDURE — 51798 US URINE CAPACITY MEASURE: CPT

## 2022-12-10 PROCEDURE — 2580000003 HC RX 258: Performed by: SURGERY

## 2022-12-10 PROCEDURE — APPNB30 APP NON BILLABLE TIME 0-30 MINS: Performed by: NURSE PRACTITIONER

## 2022-12-10 PROCEDURE — 85025 COMPLETE CBC W/AUTO DIFF WBC: CPT

## 2022-12-10 PROCEDURE — APPSS15 APP SPLIT SHARED TIME 0-15 MINUTES: Performed by: NURSE PRACTITIONER

## 2022-12-10 PROCEDURE — 83735 ASSAY OF MAGNESIUM: CPT

## 2022-12-10 PROCEDURE — 2580000003 HC RX 258: Performed by: NURSE PRACTITIONER

## 2022-12-10 PROCEDURE — 84100 ASSAY OF PHOSPHORUS: CPT

## 2022-12-10 PROCEDURE — 80053 COMPREHEN METABOLIC PANEL: CPT

## 2022-12-10 PROCEDURE — 6360000002 HC RX W HCPCS: Performed by: SURGERY

## 2022-12-10 PROCEDURE — C9113 INJ PANTOPRAZOLE SODIUM, VIA: HCPCS | Performed by: NURSE PRACTITIONER

## 2022-12-10 PROCEDURE — 99024 POSTOP FOLLOW-UP VISIT: CPT | Performed by: SURGERY

## 2022-12-10 PROCEDURE — 1200000000 HC SEMI PRIVATE

## 2022-12-10 PROCEDURE — 36415 COLL VENOUS BLD VENIPUNCTURE: CPT

## 2022-12-10 PROCEDURE — 6370000000 HC RX 637 (ALT 250 FOR IP): Performed by: SURGERY

## 2022-12-10 PROCEDURE — 2500000003 HC RX 250 WO HCPCS: Performed by: NURSE PRACTITIONER

## 2022-12-10 PROCEDURE — 6360000002 HC RX W HCPCS: Performed by: NURSE PRACTITIONER

## 2022-12-10 RX ADMIN — HYDROMORPHONE HYDROCHLORIDE 1 MG: 1 INJECTION, SOLUTION INTRAMUSCULAR; INTRAVENOUS; SUBCUTANEOUS at 06:25

## 2022-12-10 RX ADMIN — PIPERACILLIN AND TAZOBACTAM 3375 MG: 3; .375 INJECTION, POWDER, FOR SOLUTION INTRAVENOUS at 06:28

## 2022-12-10 RX ADMIN — ENOXAPARIN SODIUM 40 MG: 100 INJECTION SUBCUTANEOUS at 08:31

## 2022-12-10 RX ADMIN — KETOROLAC TROMETHAMINE 15 MG: 15 INJECTION, SOLUTION INTRAMUSCULAR; INTRAVENOUS at 08:32

## 2022-12-10 RX ADMIN — KETOROLAC TROMETHAMINE 15 MG: 15 INJECTION, SOLUTION INTRAMUSCULAR; INTRAVENOUS at 13:53

## 2022-12-10 RX ADMIN — Medication 10 ML: at 10:01

## 2022-12-10 RX ADMIN — PIPERACILLIN AND TAZOBACTAM 3375 MG: 3; .375 INJECTION, POWDER, FOR SOLUTION INTRAVENOUS at 23:31

## 2022-12-10 RX ADMIN — Medication 1 SPRAY: at 20:42

## 2022-12-10 RX ADMIN — SODIUM PHOSPHATE, MONOBASIC, MONOHYDRATE AND SODIUM PHOSPHATE, DIBASIC, ANHYDROUS 15 MMOL: 276; 142 INJECTION, SOLUTION INTRAVENOUS at 16:57

## 2022-12-10 RX ADMIN — PANTOPRAZOLE SODIUM 40 MG: 40 INJECTION, POWDER, FOR SOLUTION INTRAVENOUS at 08:32

## 2022-12-10 RX ADMIN — PIPERACILLIN AND TAZOBACTAM 3375 MG: 3; .375 INJECTION, POWDER, FOR SOLUTION INTRAVENOUS at 14:19

## 2022-12-10 RX ADMIN — ONDANSETRON 4 MG: 2 INJECTION INTRAMUSCULAR; INTRAVENOUS at 20:39

## 2022-12-10 RX ADMIN — Medication 10 ML: at 20:35

## 2022-12-10 RX ADMIN — HYDROMORPHONE HYDROCHLORIDE 1 MG: 1 INJECTION, SOLUTION INTRAMUSCULAR; INTRAVENOUS; SUBCUTANEOUS at 17:01

## 2022-12-10 RX ADMIN — HYDROMORPHONE HYDROCHLORIDE 1 MG: 1 INJECTION, SOLUTION INTRAMUSCULAR; INTRAVENOUS; SUBCUTANEOUS at 23:27

## 2022-12-10 RX ADMIN — HYDROMORPHONE HYDROCHLORIDE 1 MG: 1 INJECTION, SOLUTION INTRAMUSCULAR; INTRAVENOUS; SUBCUTANEOUS at 15:01

## 2022-12-10 RX ADMIN — ONDANSETRON 4 MG: 2 INJECTION INTRAMUSCULAR; INTRAVENOUS at 09:51

## 2022-12-10 RX ADMIN — OXYCODONE 10 MG: 5 TABLET ORAL at 08:30

## 2022-12-10 RX ADMIN — HYDROMORPHONE HYDROCHLORIDE 1 MG: 1 INJECTION, SOLUTION INTRAMUSCULAR; INTRAVENOUS; SUBCUTANEOUS at 00:25

## 2022-12-10 RX ADMIN — KETOROLAC TROMETHAMINE 15 MG: 15 INJECTION, SOLUTION INTRAMUSCULAR; INTRAVENOUS at 02:30

## 2022-12-10 RX ADMIN — KETOROLAC TROMETHAMINE 15 MG: 15 INJECTION, SOLUTION INTRAMUSCULAR; INTRAVENOUS at 20:35

## 2022-12-10 RX ADMIN — HYDROMORPHONE HYDROCHLORIDE 0.5 MG: 1 INJECTION, SOLUTION INTRAMUSCULAR; INTRAVENOUS; SUBCUTANEOUS at 09:51

## 2022-12-10 ASSESSMENT — PAIN DESCRIPTION - DESCRIPTORS
DESCRIPTORS: ACHING

## 2022-12-10 ASSESSMENT — PAIN DESCRIPTION - LOCATION
LOCATION: ABDOMEN

## 2022-12-10 ASSESSMENT — PAIN SCALES - GENERAL
PAINLEVEL_OUTOF10: 8
PAINLEVEL_OUTOF10: 8
PAINLEVEL_OUTOF10: 6
PAINLEVEL_OUTOF10: 8
PAINLEVEL_OUTOF10: 6

## 2022-12-10 ASSESSMENT — PAIN DESCRIPTION - ORIENTATION
ORIENTATION: LOWER;MID
ORIENTATION: MID
ORIENTATION: MID;LOWER
ORIENTATION: MID

## 2022-12-10 ASSESSMENT — PAIN DESCRIPTION - PAIN TYPE: TYPE: SURGICAL PAIN

## 2022-12-10 NOTE — PROGRESS NOTES
Hospitalist Progress Note      PCP: Abbie Jaramillo DO,     Date of Admission: 12/7/2022    Chief Complaint: Abd pain      Subjective:   Persistent pain, but controlled  No flatus or BM  Otherwise denies new acute complaints otherwise      Medications:  Reviewed    Infusion Medications    dextrose      sodium chloride      sodium chloride      sodium chloride 125 mL/hr at 12/09/22 1500     Scheduled Medications    ketorolac  15 mg IntraVENous Q6H    pantoprazole  40 mg IntraVENous Daily    insulin lispro  0-4 Units SubCUTAneous TID WC    insulin lispro  0-4 Units SubCUTAneous Nightly    sodium chloride flush  5-40 mL IntraVENous 2 times per day    enoxaparin  40 mg SubCUTAneous Daily    piperacillin-tazobactam  3,375 mg IntraVENous Q8H     PRN Meds: HYDROmorphone **OR** HYDROmorphone, dextrose bolus **OR** dextrose bolus, glucagon (rDNA), dextrose, sodium chloride flush, sodium chloride, ondansetron **OR** ondansetron, polyethylene glycol, acetaminophen **OR** acetaminophen, phenol, sodium chloride, bisacodyl, oxyCODONE **OR** oxyCODONE      Intake/Output Summary (Last 24 hours) at 12/10/2022 1217  Last data filed at 12/10/2022 0800  Gross per 24 hour   Intake 1506.41 ml   Output 700 ml   Net 806.41 ml         Exam:    /74   Pulse 81   Temp 98.8 °F (37.1 °C) (Temporal)   Resp 16   Ht 5' 11\" (1.803 m)   Wt 187 lb 6.3 oz (85 kg)   SpO2 95%   BMI 26.14 kg/m²     Gen/overall appearance: Not in acute distress. Alert. Head: Normocephalic, atraumatic  Eyes: EOMI, no scleral icterus  CVS: regular rate and rhythm, Normal S1S2  Pulm: Clear to auscultation bilaterally. No crackles/wheezes  Gastrointestinal: Soft, surgical dresssing, no guarding or rebound  Extremities: No edema.  No erythema or warmth  Neuro: No gross focal deficits noted  Skin: Warm, dry    Labs:   Recent Labs     12/07/22  1831 12/09/22  0547 12/10/22  0534   WBC 9.7 11.5* 12.8*   HGB 12.4* 12.0* 10.7*   HCT 39.7* 38.3* 34.1*    [facility]     12/07/22  1831 12/09/22  0547 12/10/22  0534    142 139   K 4.1 3.7  3.7 4.1    105 105   CO2 30 27 24   BUN 11 9 16   CREATININE 0.7* 0.8 0.8   CALCIUM 9.5 7.8* 8.2*   PHOS  --  3.9 2.2*       Recent Labs     12/07/22  1831 12/09/22  0547 12/10/22  0534   AST 17 14* 29   ALT 13 12 14   BILITOT <0.2 0.5 0.6   ALKPHOS 74 50 68       Recent Labs     12/09/22  0547   INR 1.18*       No results for input(s): Isabel Lai in the last 72 hours.     Assessment/Plan:    Active Hospital Problems    Diagnosis Date Noted    SBO (small bowel obstruction) ([facility] [address] [K56.609] 12/01/2022     Priority: Medium       High Grade Small Bowel Obstruction s/p  small bowel/ascending colon resection with anastomosis 12/8/22  Peritoneal adhesions due to previous procedure  NG  IVF fluid hydration  Trend lytes and Cr  Pain management  Post op care per GS     Type II DM controlled  Hold oral agents  Sliding scale insulin    Hyperlipidemia  Hold statin for now    DVT Prophylaxis: lovenox  Diet: Diet NPO Exceptions are: Sips of Water with Meds  Code Status: Full Code      Subhash Johnson MD

## 2022-12-10 NOTE — PROGRESS NOTES
12/09/22 1937   Incentive Spirometry Tx   Treatment Effort Subsequent;Assisted by RT; Well   Predicted Volume 753   Achieved Volume (mL) 1000 mL

## 2022-12-10 NOTE — PROGRESS NOTES
[facility] Laparoscopic [facility]        Post-op Note    Pt Name: Noel France  MRN: [id number]  YOB: 1960  Date of Evaluation: 12/10/2022    Subjective:    No acute events overnight  Pain controlled  No nausea or vomiting with NGT in place  No flatus or stool yet, voiding since Loera removed  Resting in bed at this time    Postoperative Day #2      Vital Signs:  Patient Vitals for the past 24 hrs:   BP Temp Temp src Pulse Resp SpO2 Weight   12/10/22 1021 -- -- -- -- 16 -- --   12/10/22 0900 -- -- -- -- 16 -- --   12/10/22 0800 -- -- -- 81 13 -- --   12/10/22 0400 -- 98.8 °F (37.1 °C) Temporal -- -- 95 % 187 lb 6.3 oz (85 kg)   12/10/22 0021 122/74 98.7 °F (37.1 °C) Temporal 81 15 95 % --   22 -- 98.9 °F (37.2 °C) Temporal 88 13 94 % --   227 -- -- -- (!) 101 25 95 % --   22 1918 126/76 98.6 °F (37 °C) Tympanic -- -- -- --   22 1800 112/70 -- -- 93 15 95 % --   22 1600 105/65 -- -- 84 13 94 % --   22 1442 -- -- -- -- 15 -- --   22 1400 135/79 -- -- 87 17 -- --   22 1256 -- -- -- -- -- 96 % --        TEMPERATURE HISTORY 24H: Temp (24hrs), Av.8 °F (37.1 °C), Min:98.6 °F (37 °C), Max:98.9 °F (37.2 °C)    BLOOD PRESSURE HISTORY: Systolic (76IWH), SWA:955 , Min:105 , GTN:902    Diastolic (84QHK), WGA:16, Min:65, Max:79      Intake/Output:    I/O last 3 completed shifts:   In: 2204.2 [I.V.:1862; IV Piggyback:342.2]  Out: 1600 [Urine:1200; Emesis/NG output:300; Blood:100]  I/O this shift:  In: 30 [P.O.:30]  Out: -   Drain/tube Output:         Physical Exam:  General: awake, alert, oriented to  person, place, time  Abdomen: soft, non-distended, appropriate incisional tenderness, bowel sounds active  Skin/Wound: healing well, scant bloody drainage, no erythema, well approximated with staples--dressing changed    Labs:  CBC:    Recent Labs     22  1831 22  0547 12/10/22  0534   WBC 9.7 11.5* 12.8*   HGB 12.4* 12.0* 10.7*   HCT 39.7* 38.3* 34.1*    230 197       BMP:    Recent Labs     12/07/22 1831 12/09/22 0547 12/10/22  0534    142 139   K 4.1 3.7  3.7 4.1    105 105   CO2 30 27 24   BUN 11 9 16   CREATININE 0.7* 0.8 0.8   GLUCOSE 188* 200* 164*       Hepatic:   Recent Labs     12/07/22 1831 12/09/22 0547 12/10/22  0534   AST 17 14* 29   ALT 13 12 14   BILITOT <0.2 0.5 0.6   ALKPHOS 74 50 68       Amylase: No results for input(s): AMYLASE in the last 72 hours. Lipase:   Recent Labs     12/07/22 1831   LIPASE 30.0       Mag:      Recent Labs     12/09/22  0547 12/10/22  0534   MG 1.40* 2.40        Phos:     Recent Labs     12/09/22  0547 12/10/22  0534   PHOS 3.9 2.2*        Coags:   Recent Labs     12/09/22 0547   INR 1.18*   APTT 32.6         Cultures:  Anaerobic culture  No results for input(s): LABANAE in the last 72 hours. Blood culture  No results for input(s): BC in the last 72 hours. Blood culture 2  No results for input(s): BLOODCULT2 in the last 72 hours. Body fluid culture  No results for input(s): BLOODCULT2 in the last 72 hours. Surgical culture  No results for input(s): CXSURG in the last 72 hours. Fecal occult  No results for input(s): OCCULTBLDFEC in the last 72 hours. Gram stain  No results for input(s): LABGRAM in the last 72 hours. Stool culture 1  No results for input(s): CXST in the last 72 hours. Stool culture 2  No results for input(s): STOOLCULT2 in the last 72 hours. Urine culture  No results for input(s): LABURIN in the last 72 hours. Wound abscess  No results for input(s): WNDABS in the last 72 hours. C Diff   No results for input(s): CDIFFTOXAB in the last 72 hours. Pathology:  Pending    Imaging:  I have personally reviewed the following films:    No results found.     Scheduled Meds:   sodium phosphate IVPB  15 mmol IntraVENous Once    ketorolac  15 mg IntraVENous Q6H    pantoprazole  40 mg IntraVENous Daily    insulin lispro  0-4 Units SubCUTAneous TID     insulin lispro  0-4 Units SubCUTAneous Nightly    sodium chloride flush  5-40 mL IntraVENous 2 times per day    enoxaparin  40 mg SubCUTAneous Daily    piperacillin-tazobactam  3,375 mg IntraVENous Q8H     Continuous Infusions:   dextrose      sodium chloride      sodium chloride      sodium chloride 125 mL/hr at 12/09/22 1500     PRN Meds:. HYDROmorphone **OR** HYDROmorphone, dextrose bolus **OR** dextrose bolus, glucagon (rDNA), dextrose, sodium chloride flush, sodium chloride, ondansetron **OR** ondansetron, polyethylene glycol, acetaminophen **OR** acetaminophen, phenol, sodium chloride, bisacodyl, oxyCODONE **OR** oxyCODONE      Assessment:  Patient Active Problem List   Diagnosis    SBO (small bowel obstruction) ([facility] [address]     OR Date 12/8/2022, exploratory laparotomy, extensive lysis of adhesions (75 minutes), small bowel and ascending colon resection with anastomosis  Diabetes      Plan:  1. Pain controlled, no nausea or vomiting with NGT in place, no bowel function yet, incision healing well, vitals stable, increase leukocytosis; continued supportive care, repeat labs tomorrow   2. NPO with NGT decompression; monitor bowel function  3. IV hydration; monitor and correct electrolytes  4. Antibiotics  5. Activity as tolerated, ambulate TID--PT/OT following  6. Pulmonary toilet, incentive spirometry  7. PRN analgesics and antiemetics--minimizing narcotics as tolerated  8. DVT prophylaxis with  Lovenox and SCD's  9. Management of medical comorbid etiologies per primary team and consulting services    EDUCATION:  Educated patient on plan of care and disease process--all questions answered. Plans discussed with patient and nursing. Reviewed and discussed with Dr. Mallory Ibanez. Signed:  MILLIE Rutledge - CNP  12/10/2022 12:34 PM    Surgery Staff:     I have personally performed a face to face diagnostic evaluation on this patient.  I have interviewed and examined the patient and I agree with the assessment above. Time was spent reviewing patient chart (including but not limited to notes, labs, imaging and other testing), interviewing and counseling patient and present family members, performing physical exam, documentation of my findings and subsequent follow up of ordered medication and testing, placing referrals and communication with patient care providers, coordinating future care as well as documentation in the EHR. This encompassed more than 50% of the total encounter time.  In summary, my findings and plan are the following:   Pt stable overnight, no new concerns  NG appears bilious this am  Incision clean, dressings changed  Loera out  Continue bowel rest today, MIVF  Follow exam, lab trend, WBC tomorrow    Zack Shelton MD

## 2022-12-11 LAB
A/G RATIO: 0.9 (ref 1.1–2.2)
ALBUMIN SERPL-MCNC: 2.7 G/DL (ref 3.4–5)
ALP BLD-CCNC: 64 U/L (ref 40–129)
ALT SERPL-CCNC: 19 U/L (ref 10–40)
ANION GAP SERPL CALCULATED.3IONS-SCNC: 8 MMOL/L (ref 3–16)
AST SERPL-CCNC: 28 U/L (ref 15–37)
BASOPHILS ABSOLUTE: 0 K/UL (ref 0–0.2)
BASOPHILS RELATIVE PERCENT: 0.1 %
BILIRUB SERPL-MCNC: 0.7 MG/DL (ref 0–1)
BUN BLDV-MCNC: 17 MG/DL (ref 7–20)
CALCIUM SERPL-MCNC: 8.4 MG/DL (ref 8.3–10.6)
CHLORIDE BLD-SCNC: 104 MMOL/L (ref 99–110)
CO2: 27 MMOL/L (ref 21–32)
CREAT SERPL-MCNC: 0.8 MG/DL (ref 0.8–1.3)
EOSINOPHILS ABSOLUTE: 0.3 K/UL (ref 0–0.6)
EOSINOPHILS RELATIVE PERCENT: 3.5 %
GFR SERPL CREATININE-BSD FRML MDRD: >60 ML/MIN/{1.73_M2}
GLUCOSE BLD-MCNC: 100 MG/DL (ref 70–99)
GLUCOSE BLD-MCNC: 120 MG/DL (ref 70–99)
GLUCOSE BLD-MCNC: 131 MG/DL (ref 70–99)
GLUCOSE BLD-MCNC: 135 MG/DL (ref 70–99)
GLUCOSE BLD-MCNC: 136 MG/DL (ref 70–99)
HCT VFR BLD CALC: 28.8 % (ref 40.5–52.5)
HEMOGLOBIN: 9.4 G/DL (ref 13.5–17.5)
LACTIC ACID: 0.7 MMOL/L (ref 0.4–2)
LYMPHOCYTES ABSOLUTE: 2.8 K/UL (ref 1–5.1)
LYMPHOCYTES RELATIVE PERCENT: 38 %
MAGNESIUM: 2.1 MG/DL (ref 1.8–2.4)
MCH RBC QN AUTO: 24.8 PG (ref 26–34)
MCHC RBC AUTO-ENTMCNC: 32.5 G/DL (ref 31–36)
MCV RBC AUTO: 76.4 FL (ref 80–100)
MONOCYTES ABSOLUTE: 0.9 K/UL (ref 0–1.3)
MONOCYTES RELATIVE PERCENT: 11.6 %
NEUTROPHILS ABSOLUTE: 3.5 K/UL (ref 1.7–7.7)
NEUTROPHILS RELATIVE PERCENT: 46.8 %
PDW BLD-RTO: 16.4 % (ref 12.4–15.4)
PERFORMED ON: ABNORMAL
PHOSPHORUS: 2.3 MG/DL (ref 2.5–4.9)
PLATELET # BLD: 207 K/UL (ref 135–450)
PMV BLD AUTO: 8.5 FL (ref 5–10.5)
POTASSIUM SERPL-SCNC: 3.5 MMOL/L (ref 3.5–5.1)
RBC # BLD: 3.77 M/UL (ref 4.2–5.9)
SODIUM BLD-SCNC: 139 MMOL/L (ref 136–145)
TOTAL PROTEIN: 5.7 G/DL (ref 6.4–8.2)
WBC # BLD: 7.4 K/UL (ref 4–11)

## 2022-12-11 PROCEDURE — 6360000002 HC RX W HCPCS: Performed by: NURSE PRACTITIONER

## 2022-12-11 PROCEDURE — 2500000003 HC RX 250 WO HCPCS: Performed by: NURSE PRACTITIONER

## 2022-12-11 PROCEDURE — 1200000000 HC SEMI PRIVATE

## 2022-12-11 PROCEDURE — C9113 INJ PANTOPRAZOLE SODIUM, VIA: HCPCS | Performed by: NURSE PRACTITIONER

## 2022-12-11 PROCEDURE — 2580000003 HC RX 258: Performed by: NURSE PRACTITIONER

## 2022-12-11 PROCEDURE — APPNB30 APP NON BILLABLE TIME 0-30 MINS: Performed by: NURSE PRACTITIONER

## 2022-12-11 PROCEDURE — 6360000002 HC RX W HCPCS: Performed by: SURGERY

## 2022-12-11 PROCEDURE — 85025 COMPLETE CBC W/AUTO DIFF WBC: CPT

## 2022-12-11 PROCEDURE — 80053 COMPREHEN METABOLIC PANEL: CPT

## 2022-12-11 PROCEDURE — 83735 ASSAY OF MAGNESIUM: CPT

## 2022-12-11 PROCEDURE — 2580000003 HC RX 258: Performed by: SURGERY

## 2022-12-11 PROCEDURE — 36415 COLL VENOUS BLD VENIPUNCTURE: CPT

## 2022-12-11 PROCEDURE — APPSS15 APP SPLIT SHARED TIME 0-15 MINUTES: Performed by: NURSE PRACTITIONER

## 2022-12-11 PROCEDURE — 84100 ASSAY OF PHOSPHORUS: CPT

## 2022-12-11 PROCEDURE — 83605 ASSAY OF LACTIC ACID: CPT

## 2022-12-11 PROCEDURE — 99024 POSTOP FOLLOW-UP VISIT: CPT | Performed by: SURGERY

## 2022-12-11 RX ORDER — DEXTROSE, SODIUM CHLORIDE, AND POTASSIUM CHLORIDE 5; .45; .15 G/100ML; G/100ML; G/100ML
INJECTION INTRAVENOUS CONTINUOUS
Status: DISCONTINUED | OUTPATIENT
Start: 2022-12-11 | End: 2022-12-12

## 2022-12-11 RX ADMIN — KETOROLAC TROMETHAMINE 15 MG: 15 INJECTION, SOLUTION INTRAMUSCULAR; INTRAVENOUS at 08:18

## 2022-12-11 RX ADMIN — HYDROMORPHONE HYDROCHLORIDE 1 MG: 1 INJECTION, SOLUTION INTRAMUSCULAR; INTRAVENOUS; SUBCUTANEOUS at 06:27

## 2022-12-11 RX ADMIN — PIPERACILLIN AND TAZOBACTAM 3375 MG: 3; .375 INJECTION, POWDER, FOR SOLUTION INTRAVENOUS at 15:49

## 2022-12-11 RX ADMIN — POTASSIUM CHLORIDE, DEXTROSE MONOHYDRATE AND SODIUM CHLORIDE: 150; 5; 450 INJECTION, SOLUTION INTRAVENOUS at 13:11

## 2022-12-11 RX ADMIN — PANTOPRAZOLE SODIUM 40 MG: 40 INJECTION, POWDER, FOR SOLUTION INTRAVENOUS at 08:18

## 2022-12-11 RX ADMIN — HYDROMORPHONE HYDROCHLORIDE 0.5 MG: 1 INJECTION, SOLUTION INTRAMUSCULAR; INTRAVENOUS; SUBCUTANEOUS at 20:24

## 2022-12-11 RX ADMIN — KETOROLAC TROMETHAMINE 15 MG: 15 INJECTION, SOLUTION INTRAMUSCULAR; INTRAVENOUS at 20:24

## 2022-12-11 RX ADMIN — HYDROMORPHONE HYDROCHLORIDE 1 MG: 1 INJECTION, SOLUTION INTRAMUSCULAR; INTRAVENOUS; SUBCUTANEOUS at 15:58

## 2022-12-11 RX ADMIN — SODIUM CHLORIDE: 9 INJECTION, SOLUTION INTRAVENOUS at 11:17

## 2022-12-11 RX ADMIN — HYDROMORPHONE HYDROCHLORIDE 1 MG: 1 INJECTION, SOLUTION INTRAMUSCULAR; INTRAVENOUS; SUBCUTANEOUS at 02:07

## 2022-12-11 RX ADMIN — KETOROLAC TROMETHAMINE 15 MG: 15 INJECTION, SOLUTION INTRAMUSCULAR; INTRAVENOUS at 13:09

## 2022-12-11 RX ADMIN — KETOROLAC TROMETHAMINE 15 MG: 15 INJECTION, SOLUTION INTRAMUSCULAR; INTRAVENOUS at 04:25

## 2022-12-11 RX ADMIN — POTASSIUM PHOSPHATE, MONOBASIC AND POTASSIUM PHOSPHATE, DIBASIC 15 MMOL: 224; 236 INJECTION, SOLUTION, CONCENTRATE INTRAVENOUS at 11:14

## 2022-12-11 RX ADMIN — PIPERACILLIN AND TAZOBACTAM 3375 MG: 3; .375 INJECTION, POWDER, FOR SOLUTION INTRAVENOUS at 06:30

## 2022-12-11 RX ADMIN — SODIUM CHLORIDE: 9 INJECTION, SOLUTION INTRAVENOUS at 11:13

## 2022-12-11 RX ADMIN — ENOXAPARIN SODIUM 40 MG: 100 INJECTION SUBCUTANEOUS at 08:18

## 2022-12-11 RX ADMIN — Medication 10 ML: at 08:19

## 2022-12-11 ASSESSMENT — PAIN SCALES - GENERAL
PAINLEVEL_OUTOF10: 6
PAINLEVEL_OUTOF10: 8
PAINLEVEL_OUTOF10: 6
PAINLEVEL_OUTOF10: 7
PAINLEVEL_OUTOF10: 8
PAINLEVEL_OUTOF10: 3
PAINLEVEL_OUTOF10: 8
PAINLEVEL_OUTOF10: 3
PAINLEVEL_OUTOF10: 8

## 2022-12-11 ASSESSMENT — PAIN DESCRIPTION - ORIENTATION
ORIENTATION: MID
ORIENTATION: MID;LOWER

## 2022-12-11 ASSESSMENT — PAIN DESCRIPTION - LOCATION
LOCATION: ABDOMEN

## 2022-12-11 ASSESSMENT — PAIN DESCRIPTION - DESCRIPTORS
DESCRIPTORS: ACHING
DESCRIPTORS: ACHING

## 2022-12-11 ASSESSMENT — PAIN DESCRIPTION - PAIN TYPE: TYPE: SURGICAL PAIN

## 2022-12-11 NOTE — PROGRESS NOTES
AM assessment complete. Oriented x4. Report abdominal pain at incision site. Scheduled toradol given. Dressing clean, dry, and intact. Respirations regular and unlabored. Patient on room air. Desats at times. Encouraged patient to get up to chair. NSR on tele. NG to ILWS. Fall precautions in place.

## 2022-12-11 NOTE — PROGRESS NOTES
PM assessment complete and documented. Meds given per MAR. NG secure to ILWS. Draining without difficulty. Assisted pt to restroom. Voided per urinal. Abd dressing dry and intact. No other needs expressed. Will continue to monitor.

## 2022-12-11 NOTE — PROGRESS NOTES
Hospitalist Progress Note      PCP: Catherine Thomas DO,     Date of Admission: 12/7/2022    Chief Complaint: Abd pain      Subjective:   Abd pain controlled. Denies n/v. No flatus or BM  NG out      Medications:  Reviewed    Infusion Medications    dextrose 5% and 0.45% NaCl with KCl 20 mEq 100 mL/hr at 12/11/22 1311    dextrose      sodium chloride      sodium chloride 10 mL/hr at 12/11/22 1113     Scheduled Medications    potassium phosphate IVPB  15 mmol IntraVENous Once    ketorolac  15 mg IntraVENous Q6H    pantoprazole  40 mg IntraVENous Daily    insulin lispro  0-4 Units SubCUTAneous TID WC    insulin lispro  0-4 Units SubCUTAneous Nightly    sodium chloride flush  5-40 mL IntraVENous 2 times per day    enoxaparin  40 mg SubCUTAneous Daily    piperacillin-tazobactam  3,375 mg IntraVENous Q8H     PRN Meds: HYDROmorphone **OR** HYDROmorphone, dextrose bolus **OR** dextrose bolus, glucagon (rDNA), dextrose, sodium chloride flush, sodium chloride, ondansetron **OR** ondansetron, polyethylene glycol, acetaminophen **OR** acetaminophen, phenol, sodium chloride, bisacodyl, oxyCODONE **OR** oxyCODONE      Intake/Output Summary (Last 24 hours) at 12/11/2022 1319  Last data filed at 12/11/2022 0956  Gross per 24 hour   Intake 10 ml   Output 1000 ml   Net -990 ml         Exam:    /73   Pulse 87   Temp 99.1 °F (37.3 °C) (Temporal)   Resp 21   Ht 5' 11\" (1.803 m)   Wt 189 lb (85.7 kg)   SpO2 96%   BMI 26.36 kg/m²     Gen/overall appearance: Not in acute distress. Alert. Head: Normocephalic, atraumatic  Eyes: EOMI, no scleral icterus  CVS: regular rate and rhythm, Normal S1S2  Pulm: Clear to auscultation bilaterally. No crackles/wheezes  Gastrointestinal: Soft, surgical dresssing, no guarding or rebound  Extremities: No edema.  No erythema or warmth  Neuro: No gross focal deficits noted  Skin: Warm, dry    Labs:   Recent Labs     12/09/22  0547 12/10/22  0534 12/11/22  0429   WBC 11.5* 12.8* 7.4 HGB 12.0* 10.7* 9.4*   HCT 38.3* 34.1* 28.8*    197 207       Recent Labs     12/09/22  0547 12/10/22  0534 12/11/22  0429    139 139   K 3.7  3.7 4.1 3.5    105 104   CO2 27 24 27   BUN 9 16 17   CREATININE 0.8 0.8 0.8   CALCIUM 7.8* 8.2* 8.4   PHOS 3.9 2.2* 2.3*       Recent Labs     12/09/22  0547 12/10/22  0534 12/11/22  0429   AST 14* 29 28   ALT 12 14 19   BILITOT 0.5 0.6 0.7   ALKPHOS 50 68 64       Recent Labs     12/09/22  0547   INR 1.18*       No results for input(s): Pedro Hernandez in the last 72 hours.     Assessment/Plan:    Active Hospital Problems    Diagnosis Date Noted    [facility] [K56.609] 12/01/2022     Priority: Medium       High Grade Small Bowel Obstruction s/p  small bowel/ascending colon resection with anastomosis 12/8/22  Peritoneal adhesions due to previous procedure  NG out  IVF fluid hydration  Trend lytes and Cr  Pain management  Post op care per GS    Acute blood loss anemia  Trend H&H  Transfuse for Hgb <7     Type II DM controlled  Hold oral agents  Sliding scale insulin    Hyperlipidemia  Hold statin for now    DVT Prophylaxis: lovenox  Diet: Diet NPO Exceptions are: Sips of Water with Meds, Ice Chips  Code Status: Full Code      Carol Henson MD

## 2022-12-11 NOTE — PROGRESS NOTES
Voiding per urinal. Medicated with toradol for pain. NG secure with output noted. Will continue to monitor.

## 2022-12-11 NOTE — PROGRESS NOTES
No changes noted in assessment. NG secure and draining without issues. Pt calls appropriately to use the bathroom. Pt reports +flatus. Will continue to monitor.

## 2022-12-11 NOTE — PROGRESS NOTES
[facility]        Post-op Note    Pt Name: Jose Miranda  MRN: [id number]  [place]: 1960  Date of Evaluation: 2022    Subjective:    No acute events overnight  Pain controlled  No nausea or vomiting with NGT in place with low output  No flatus or stool  Resting in bed at this time    Postoperative Day #3      Vital Signs:  Patient Vitals for the past 24 hrs:   BP Temp Temp src Pulse Resp SpO2 Weight   22 0800 128/73 98.3 °F (36.8 °C) Temporal 82 11 90 % --   22 0424 129/70 98 °F (36.7 °C) Temporal 89 13 -- 189 lb (85.7 kg)   22 0004 118/70 98.4 °F (36.9 °C) Temporal 77 18 91 % --   12/10/22 2029 129/75 97.9 °F (36.6 °C) Temporal 88 17 92 % --   12/10/22 1800 129/75 -- -- 81 11 96 % --   12/10/22 1731 -- -- -- -- 16 -- --   12/10/22 1701 -- -- -- -- 16 -- --   12/10/22 1700 125/76 -- -- 83 21 -- --   12/10/22 1600 -- 99.4 °F (37.4 °C) Temporal 83 10 96 % --   12/10/22 1531 -- -- -- -- 15 -- --        TEMPERATURE HISTORY 24H: Temp (24hrs), Av.4 °F (36.9 °C), Min:97.9 °F (36.6 °C), Max:99.4 °F (37.4 °C)    BLOOD PRESSURE HISTORY: Systolic (63JPK), DAKOTAH:465 , Min:118 , UUH:073    Diastolic (47ZBL), AFZ:71, Min:70, Max:77      Intake/Output:    I/O last 3 completed shifts:   In: 677.8 [P.O.:30; I.V.:449; IV Piggyback:198.8]  Out: 1150 [Urine:750; Emesis/NG output:400]  I/O this shift:  In: 10 [I.V.:10]  Out: 300 [Urine:300]  Drain/tube Output:         Physical Exam:  General: awake, alert, oriented to  person, place, time  Abdomen: soft, non-distended, appropriate incisional tenderness, bowel sounds active  Skin/Wound: healing well, scant bloody drainage, no erythema, well approximated with staples--dressing changed    Labs:  CBC:    Recent Labs     22  0547 12/10/22  0534 22  0429   WBC 11.5* 12.8* 7.4   HGB 12.0* 10.7* 9.4*   HCT 38.3* 34.1* 28.8*    197 207       BMP:    Recent Labs     2247 12/10/22  0534 12/11/22 0429    139 139   K 3.7  3.7 4.1 3.5    105 104   CO2 27 24 27   BUN 9 16 17   CREATININE 0.8 0.8 0.8   GLUCOSE 200* 164* 131*       Hepatic:   Recent Labs     12/09/22  0547 12/10/22  0534 12/11/22  0429   AST 14* 29 28   ALT 12 14 19   BILITOT 0.5 0.6 0.7   ALKPHOS 50 68 64       Amylase: No results for input(s): AMYLASE in the last 72 hours. Lipase:   No results for input(s): LIPASE in the last 72 hours. Mag:      Recent Labs     12/09/22  0547 12/10/22  0534 12/11/22  0429   MG 1.40* 2.40 2.10        Phos:     Recent Labs     12/09/22 0547 12/10/22  0534 12/11/22  0429   PHOS 3.9 2.2* 2.3*        Coags:   Recent Labs     12/09/22 0547   INR 1.18*   APTT 32.6         Cultures:  Anaerobic culture  No results for input(s): LABANAE in the last 72 hours. Blood culture  No results for input(s): BC in the last 72 hours. Blood culture 2  No results for input(s): BLOODCULT2 in the last 72 hours. Body fluid culture  No results for input(s): BLOODCULT2 in the last 72 hours. Surgical culture  No results for input(s): CXSURG in the last 72 hours. Fecal occult  No results for input(s): OCCULTBLDFEC in the last 72 hours. Gram stain  No results for input(s): LABGRAM in the last 72 hours. Stool culture 1  No results for input(s): CXST in the last 72 hours. Stool culture 2  No results for input(s): STOOLCULT2 in the last 72 hours. Urine culture  No results for input(s): LABURIN in the last 72 hours. Wound abscess  No results for input(s): WNDABS in the last 72 hours. C Diff   No results for input(s): CDIFFTOXAB in the last 72 hours. Pathology:  Pending    Imaging:  I have personally reviewed the following films:    No results found.     Scheduled Meds:   potassium phosphate IVPB  15 mmol IntraVENous Once    ketorolac  15 mg IntraVENous Q6H    pantoprazole  40 mg IntraVENous Daily    insulin lispro  0-4 Units SubCUTAneous TID     insulin lispro  0-4 Units SubCUTAneous Nightly    sodium chloride flush  5-40 mL IntraVENous 2 times per day    enoxaparin  40 mg SubCUTAneous Daily    piperacillin-tazobactam  3,375 mg IntraVENous Q8H     Continuous Infusions:   dextrose 5% and 0.45% NaCl with KCl 20 mEq      dextrose      sodium chloride      sodium chloride 10 mL/hr at 12/11/22 1113     PRN Meds:. HYDROmorphone **OR** HYDROmorphone, dextrose bolus **OR** dextrose bolus, glucagon (rDNA), dextrose, sodium chloride flush, sodium chloride, ondansetron **OR** ondansetron, polyethylene glycol, acetaminophen **OR** acetaminophen, phenol, sodium chloride, bisacodyl, oxyCODONE **OR** oxyCODONE      Assessment:  Patient Active Problem List   Diagnosis    SBO (small bowel obstruction) ([facility])     OR Date 12/8/2022, exploratory laparotomy, extensive lysis of adhesions (75 minutes), small bowel and ascending colon resection with anastomosis  Diabetes      Plan:  1. Pain controlled, no nausea or vomiting with NGT in place--low output, no bowel function yet, incision healing well, vitals stable, leukocytosis resolved; continued supportive care, remove NGT  2. Keep NPO with ice/med today; monitor bowel function  3. IV hydration, decrease rate, switch to maintenance fluids; monitor and correct electrolytes  4. Antibiotics  5. Activity as tolerated, ambulate TID--PT/OT following  6. Pulmonary toilet, incentive spirometry  7. PRN analgesics and antiemetics--minimizing narcotics as tolerated  8. DVT prophylaxis with  Lovenox and SCD's  9. Management of medical comorbid etiologies per primary team and consulting services    EDUCATION:  Educated patient on plan of care and disease process--all questions answered. Plans discussed with patient and nursing. Reviewed and discussed with Dr. Frederick Peralta. Signed:  MILLIE Terry CNP  12/11/2022 12:18 PM  Surgery Staff:     I have personally performed a face to face diagnostic evaluation on this patient.  I have interviewed and examined the patient and I agree with the assessment above. Time was spent reviewing patient chart (including but not limited to notes, labs, imaging and other testing), interviewing and counseling patient and present family members, performing physical exam, documentation of my findings and subsequent follow up of ordered medication and testing, placing referrals and communication with patient care providers, coordinating future care as well as documentation in the EHR. This encompassed more than 50% of the total encounter time.  In summary, my findings and plan are the following:   Clinically improved  PE benign and incision without infection  NG removed  Begin diet tomorrow  OOB, IS, routine post op care    Shi Green MD

## 2022-12-12 LAB
A/G RATIO: 1 (ref 1.1–2.2)
ALBUMIN SERPL-MCNC: 2.9 G/DL (ref 3.4–5)
ALP BLD-CCNC: 65 U/L (ref 40–129)
ALT SERPL-CCNC: 18 U/L (ref 10–40)
ANION GAP SERPL CALCULATED.3IONS-SCNC: 5 MMOL/L (ref 3–16)
AST SERPL-CCNC: 20 U/L (ref 15–37)
BASOPHILS ABSOLUTE: 0 K/UL (ref 0–0.2)
BASOPHILS RELATIVE PERCENT: 0.3 %
BILIRUB SERPL-MCNC: 0.7 MG/DL (ref 0–1)
BUN BLDV-MCNC: 15 MG/DL (ref 7–20)
CALCIUM SERPL-MCNC: 8.5 MG/DL (ref 8.3–10.6)
CHLORIDE BLD-SCNC: 104 MMOL/L (ref 99–110)
CO2: 31 MMOL/L (ref 21–32)
CREAT SERPL-MCNC: 0.8 MG/DL (ref 0.8–1.3)
EOSINOPHILS ABSOLUTE: 0.3 K/UL (ref 0–0.6)
EOSINOPHILS RELATIVE PERCENT: 3.9 %
GFR SERPL CREATININE-BSD FRML MDRD: >60 ML/MIN/{1.73_M2}
GLUCOSE BLD-MCNC: 139 MG/DL (ref 70–99)
GLUCOSE BLD-MCNC: 140 MG/DL (ref 70–99)
GLUCOSE BLD-MCNC: 214 MG/DL (ref 70–99)
GLUCOSE BLD-MCNC: 215 MG/DL (ref 70–99)
GLUCOSE BLD-MCNC: 243 MG/DL (ref 70–99)
HCT VFR BLD CALC: 28.6 % (ref 40.5–52.5)
HEMOGLOBIN: 9.3 G/DL (ref 13.5–17.5)
LYMPHOCYTES ABSOLUTE: 3.8 K/UL (ref 1–5.1)
LYMPHOCYTES RELATIVE PERCENT: 45.7 %
MAGNESIUM: 1.8 MG/DL (ref 1.8–2.4)
MCH RBC QN AUTO: 24.4 PG (ref 26–34)
MCHC RBC AUTO-ENTMCNC: 32.4 G/DL (ref 31–36)
MCV RBC AUTO: 75.5 FL (ref 80–100)
MONOCYTES ABSOLUTE: 0.7 K/UL (ref 0–1.3)
MONOCYTES RELATIVE PERCENT: 8.1 %
NEUTROPHILS ABSOLUTE: 3.5 K/UL (ref 1.7–7.7)
NEUTROPHILS RELATIVE PERCENT: 42 %
PDW BLD-RTO: 16.3 % (ref 12.4–15.4)
PERFORMED ON: ABNORMAL
PLATELET # BLD: 240 K/UL (ref 135–450)
PMV BLD AUTO: 8 FL (ref 5–10.5)
POTASSIUM REFLEX MAGNESIUM: 3.4 MMOL/L (ref 3.5–5.1)
RBC # BLD: 3.79 M/UL (ref 4.2–5.9)
SODIUM BLD-SCNC: 140 MMOL/L (ref 136–145)
TOTAL PROTEIN: 5.9 G/DL (ref 6.4–8.2)
WBC # BLD: 8.4 K/UL (ref 4–11)

## 2022-12-12 PROCEDURE — 83735 ASSAY OF MAGNESIUM: CPT

## 2022-12-12 PROCEDURE — C9113 INJ PANTOPRAZOLE SODIUM, VIA: HCPCS | Performed by: NURSE PRACTITIONER

## 2022-12-12 PROCEDURE — 99024 POSTOP FOLLOW-UP VISIT: CPT | Performed by: SURGERY

## 2022-12-12 PROCEDURE — APPNB30 APP NON BILLABLE TIME 0-30 MINS: Performed by: NURSE PRACTITIONER

## 2022-12-12 PROCEDURE — 97530 THERAPEUTIC ACTIVITIES: CPT

## 2022-12-12 PROCEDURE — 2700000000 HC OXYGEN THERAPY PER DAY

## 2022-12-12 PROCEDURE — 2500000003 HC RX 250 WO HCPCS: Performed by: NURSE PRACTITIONER

## 2022-12-12 PROCEDURE — 2580000003 HC RX 258: Performed by: SURGERY

## 2022-12-12 PROCEDURE — 6360000002 HC RX W HCPCS: Performed by: NURSE PRACTITIONER

## 2022-12-12 PROCEDURE — APPSS15 APP SPLIT SHARED TIME 0-15 MINUTES: Performed by: NURSE PRACTITIONER

## 2022-12-12 PROCEDURE — 6370000000 HC RX 637 (ALT 250 FOR IP): Performed by: SURGERY

## 2022-12-12 PROCEDURE — 85025 COMPLETE CBC W/AUTO DIFF WBC: CPT

## 2022-12-12 PROCEDURE — 1200000000 HC SEMI PRIVATE

## 2022-12-12 PROCEDURE — 36415 COLL VENOUS BLD VENIPUNCTURE: CPT

## 2022-12-12 PROCEDURE — 80053 COMPREHEN METABOLIC PANEL: CPT

## 2022-12-12 PROCEDURE — 6360000002 HC RX W HCPCS: Performed by: SURGERY

## 2022-12-12 PROCEDURE — 2580000003 HC RX 258: Performed by: INTERNAL MEDICINE

## 2022-12-12 PROCEDURE — 97116 GAIT TRAINING THERAPY: CPT

## 2022-12-12 RX ORDER — HYDROMORPHONE HYDROCHLORIDE 1 MG/ML
1 INJECTION, SOLUTION INTRAMUSCULAR; INTRAVENOUS; SUBCUTANEOUS
Status: DISCONTINUED | OUTPATIENT
Start: 2022-12-12 | End: 2022-12-16

## 2022-12-12 RX ORDER — HYDROMORPHONE HYDROCHLORIDE 1 MG/ML
0.5 INJECTION, SOLUTION INTRAMUSCULAR; INTRAVENOUS; SUBCUTANEOUS
Status: DISCONTINUED | OUTPATIENT
Start: 2022-12-12 | End: 2022-12-16

## 2022-12-12 RX ORDER — POTASSIUM CHLORIDE 7.45 MG/ML
10 INJECTION INTRAVENOUS PRN
Status: DISCONTINUED | OUTPATIENT
Start: 2022-12-12 | End: 2022-12-17 | Stop reason: HOSPADM

## 2022-12-12 RX ORDER — SODIUM CHLORIDE, SODIUM LACTATE, POTASSIUM CHLORIDE, CALCIUM CHLORIDE 600; 310; 30; 20 MG/100ML; MG/100ML; MG/100ML; MG/100ML
INJECTION, SOLUTION INTRAVENOUS CONTINUOUS
Status: DISCONTINUED | OUTPATIENT
Start: 2022-12-12 | End: 2022-12-16

## 2022-12-12 RX ORDER — MAGNESIUM SULFATE IN WATER 40 MG/ML
2000 INJECTION, SOLUTION INTRAVENOUS PRN
Status: DISCONTINUED | OUTPATIENT
Start: 2022-12-12 | End: 2022-12-17 | Stop reason: HOSPADM

## 2022-12-12 RX ORDER — POTASSIUM CHLORIDE 20 MEQ/1
40 TABLET, EXTENDED RELEASE ORAL PRN
Status: DISCONTINUED | OUTPATIENT
Start: 2022-12-12 | End: 2022-12-17 | Stop reason: HOSPADM

## 2022-12-12 RX ORDER — POTASSIUM CHLORIDE 7.45 MG/ML
10 INJECTION INTRAVENOUS ONCE
Status: COMPLETED | OUTPATIENT
Start: 2022-12-12 | End: 2022-12-12

## 2022-12-12 RX ADMIN — PANTOPRAZOLE SODIUM 40 MG: 40 INJECTION, POWDER, FOR SOLUTION INTRAVENOUS at 08:51

## 2022-12-12 RX ADMIN — HYDROMORPHONE HYDROCHLORIDE 1 MG: 1 INJECTION, SOLUTION INTRAMUSCULAR; INTRAVENOUS; SUBCUTANEOUS at 01:13

## 2022-12-12 RX ADMIN — INSULIN LISPRO 1 UNITS: 100 INJECTION, SOLUTION INTRAVENOUS; SUBCUTANEOUS at 17:45

## 2022-12-12 RX ADMIN — PIPERACILLIN AND TAZOBACTAM 3375 MG: 3; .375 INJECTION, POWDER, FOR SOLUTION INTRAVENOUS at 16:16

## 2022-12-12 RX ADMIN — KETOROLAC TROMETHAMINE 15 MG: 15 INJECTION, SOLUTION INTRAMUSCULAR; INTRAVENOUS at 08:52

## 2022-12-12 RX ADMIN — POTASSIUM CHLORIDE 10 MEQ: 7.46 INJECTION, SOLUTION INTRAVENOUS at 13:21

## 2022-12-12 RX ADMIN — ENOXAPARIN SODIUM 40 MG: 100 INJECTION SUBCUTANEOUS at 08:51

## 2022-12-12 RX ADMIN — PIPERACILLIN AND TAZOBACTAM 3375 MG: 3; .375 INJECTION, POWDER, FOR SOLUTION INTRAVENOUS at 09:31

## 2022-12-12 RX ADMIN — KETOROLAC TROMETHAMINE 15 MG: 15 INJECTION, SOLUTION INTRAMUSCULAR; INTRAVENOUS at 04:05

## 2022-12-12 RX ADMIN — POTASSIUM CHLORIDE, DEXTROSE MONOHYDRATE AND SODIUM CHLORIDE: 150; 5; 450 INJECTION, SOLUTION INTRAVENOUS at 06:38

## 2022-12-12 RX ADMIN — PIPERACILLIN AND TAZOBACTAM 3375 MG: 3; .375 INJECTION, POWDER, FOR SOLUTION INTRAVENOUS at 00:50

## 2022-12-12 RX ADMIN — KETOROLAC TROMETHAMINE 15 MG: 15 INJECTION, SOLUTION INTRAMUSCULAR; INTRAVENOUS at 13:22

## 2022-12-12 RX ADMIN — KETOROLAC TROMETHAMINE 15 MG: 15 INJECTION, SOLUTION INTRAMUSCULAR; INTRAVENOUS at 20:18

## 2022-12-12 RX ADMIN — INSULIN LISPRO 1 UNITS: 100 INJECTION, SOLUTION INTRAVENOUS; SUBCUTANEOUS at 13:22

## 2022-12-12 RX ADMIN — SODIUM CHLORIDE, POTASSIUM CHLORIDE, SODIUM LACTATE AND CALCIUM CHLORIDE: 600; 310; 30; 20 INJECTION, SOLUTION INTRAVENOUS at 13:17

## 2022-12-12 ASSESSMENT — PAIN DESCRIPTION - PAIN TYPE: TYPE: SURGICAL PAIN

## 2022-12-12 ASSESSMENT — PAIN SCALES - GENERAL
PAINLEVEL_OUTOF10: 7
PAINLEVEL_OUTOF10: 9
PAINLEVEL_OUTOF10: 6
PAINLEVEL_OUTOF10: 4
PAINLEVEL_OUTOF10: 7
PAINLEVEL_OUTOF10: 7

## 2022-12-12 ASSESSMENT — PAIN DESCRIPTION - LOCATION
LOCATION: ABDOMEN

## 2022-12-12 ASSESSMENT — PAIN DESCRIPTION - ORIENTATION: ORIENTATION: MID;LOWER

## 2022-12-12 ASSESSMENT — PAIN DESCRIPTION - DESCRIPTORS: DESCRIPTORS: ACHING

## 2022-12-12 NOTE — PROGRESS NOTES
[facility]        Post-op Note    Pt Name: Keren Iqbal  MRN: [id number]  YOB: 1960  Date of Evaluation: 2022    Subjective:    No acute events overnight  Pain controlled  No nausea or vomiting since NGT removed, feels hungry  Passing flatus, no stool, denies bloating  Resting in bed at this time    Postoperative Day #4      Vital Signs:  Patient Vitals for the past 24 hrs:   BP Temp Temp src Pulse Resp SpO2 Weight   22 0848 (!) 154/79 99 °F (37.2 °C) Temporal -- -- 95 % --   22 0407 (!) 140/86 97.7 °F (36.5 °C) -- 79 15 96 % 192 lb 4.8 oz (87.2 kg)   22 0050 132/81 98.2 °F (36.8 °C) Temporal 72 13 98 % --   22 2019 121/69 98.2 °F (36.8 °C) Temporal 73 14 95 % --   22 1600 -- -- -- 74 -- -- --   22 1552 (!) 147/80 98.4 °F (36.9 °C) Temporal 75 18 94 % --   22 1306 125/73 99.1 °F (37.3 °C) Temporal 87 21 96 % --   22 1200 133/70 -- -- 74 10 -- --   22 1000 120/68 -- -- 72 (!) 8 -- --        TEMPERATURE HISTORY 24H: Temp (24hrs), Av.4 °F (36.9 °C), Min:97.7 °F (36.5 °C), Max:99.1 °F (37.3 °C)    BLOOD PRESSURE HISTORY: Systolic (86YPC), RTY:412 , Min:118 , KNF:572    Diastolic (62ERE), SMV:08, Min:68, Max:86      Intake/Output:    I/O last 3 completed shifts: In: 899.4 [I.V.:511.7; IV Piggyback:387.7]  Out: 700 [Urine:700]  No intake/output data recorded.   Drain/tube Output:         Physical Exam:  General: awake, alert, oriented to  person, place, time  Abdomen: soft, non-distended, appropriate incisional tenderness, bowel sounds active  Skin/Wound: healing well, scant bloody drainage, no erythema, well approximated with staples--dressing changed    Labs:  CBC:    Recent Labs     12/10/22  0534 22   WBC 12.8* 7.4 8.4   HGB 10.7* 9.4* 9.3*   HCT 34.1* 28.8* 28.6*    207 240       BMP:    Recent Labs     12/10/22  0534 229 22    139 140   K 4.1 3.5 3.4*    104 104   CO2 24 27 31   BUN 16 17 15   CREATININE 0.8 0.8 0.8   GLUCOSE 164* 131* 139*       Hepatic:   Recent Labs     12/10/22  0534 12/11/22 0429 12/12/22 0449   AST 29 28 20   ALT 14 19 18   BILITOT 0.6 0.7 0.7   ALKPHOS 68 64 65       Amylase: No results for input(s): AMYLASE in the last 72 hours. Lipase:   No results for input(s): LIPASE in the last 72 hours. Mag:      Recent Labs     12/10/22  0534 12/11/22 0429 12/12/22 0449   MG 2.40 2.10 1.80        Phos:     Recent Labs     12/10/22  0534 12/11/22 0429   PHOS 2.2* 2.3*        Coags:   No results for input(s): INR, APTT in the last 72 hours. Cultures:  Anaerobic culture  No results for input(s): LABANAE in the last 72 hours. Blood culture  No results for input(s): BC in the last 72 hours. Blood culture 2  No results for input(s): BLOODCULT2 in the last 72 hours. Body fluid culture  No results for input(s): BLOODCULT2 in the last 72 hours. Surgical culture  No results for input(s): CXSURG in the last 72 hours. Fecal occult  No results for input(s): OCCULTBLDFEC in the last 72 hours. Gram stain  No results for input(s): LABGRAM in the last 72 hours. Stool culture 1  No results for input(s): CXST in the last 72 hours. Stool culture 2  No results for input(s): STOOLCULT2 in the last 72 hours. Urine culture  No results for input(s): LABURIN in the last 72 hours. Wound abscess  No results for input(s): WNDABS in the last 72 hours. C Diff   No results for input(s): CDIFFTOXAB in the last 72 hours. Pathology:  Pending    Imaging:  I have personally reviewed the following films:    No results found.     Scheduled Meds:   potassium chloride  10 mEq IntraVENous Once    ketorolac  15 mg IntraVENous Q6H    pantoprazole  40 mg IntraVENous Daily    insulin lispro  0-4 Units SubCUTAneous TID WC    insulin lispro  0-4 Units SubCUTAneous Nightly    sodium chloride flush  5-40 mL IntraVENous 2 times per day    enoxaparin  40 mg SubCUTAneous Daily    piperacillin-tazobactam  3,375 mg IntraVENous Q8H     Continuous Infusions:   dextrose 5% and 0.45% NaCl with KCl 20 mEq 100 mL/hr at 12/12/22 0638    dextrose      sodium chloride      sodium chloride Stopped (12/11/22 1505)     PRN Meds:. HYDROmorphone **OR** HYDROmorphone, magnesium sulfate, potassium chloride **OR** potassium alternative oral replacement **OR** potassium chloride, dextrose bolus **OR** dextrose bolus, glucagon (rDNA), dextrose, sodium chloride flush, sodium chloride, ondansetron **OR** ondansetron, polyethylene glycol, acetaminophen **OR** acetaminophen, phenol, sodium chloride, bisacodyl, oxyCODONE **OR** oxyCODONE      Assessment:  Patient Active Problem List   Diagnosis    SBO (small bowel obstruction) ([facility] [address]     OR Date 12/8/2022, exploratory laparotomy, extensive lysis of adhesions (75 minutes), small bowel and ascending colon resection with anastomosis  Diabetes      Plan:  1. Pain controlled, no nausea or vomiting since NGT removed, passing flatus, no stool, incision healing well, vitals/labs stable; continued supportive care  2. Start clear liquid diet as tolerated; monitor bowel function  3. IV hydration; monitor and correct electrolytes  4. Antibiotics  5. Activity as tolerated, ambulate TID, up to chair for meals--PT/OT following  6. Pulmonary toilet, incentive spirometry  7. PRN analgesics and antiemetics--minimizing narcotics as tolerated  8. DVT prophylaxis with  Lovenox and SCD's  9. Management of medical comorbid etiologies per primary team and consulting services  10. Disposition: Anticipate discharge home in the next 24-48 hours    EDUCATION:  Educated patient on plan of care and disease process--all questions answered. Plans discussed with patient and nursing. Reviewed and discussed with Dr. Kang Lira.       Signed:  MILLIE Covarrubias CNP  12/12/2022 9:17 AM    I have personally performed a face to face diagnostic evaluation on this patient. I have interviewed and examined the patient and I agree with the assessment above. Time was spent reviewing patient chart (including but not limited to notes, labs, imaging and other testing), interviewing and counseling patient and present family members, performing physical exam, documentation of my findings and subsequent follow up of ordered medication and testing, placing referrals and communication with patient care providers, coordinating future care as well as documentation in the EHR. This encompassed more than 50% of the total encounter time. In summary, my findings and plan are the following:   Mr. Uma Melgoza is a 60 y.o. male who presents with   OR Date 12/8/2022, exploratory laparotomy, extensive lysis of adhesions (75 minutes), small bowel and ascending colon resection with anastomosis  Diabetes    Plan:  1. Monitor bowel function, passing flatus but no stool yet  2. No nausea with NG removed, advance to clear liquids  3. IVF, monitor and replace electrolytes as needed  4. Pain control, transition to PO and minimize narcotics  5. Increase activity as able  6. Antibiotics  7. Defer management of remainder of medical comorbidities to primary and consulting teams    Rey Lozoya MD, FACS  12/12/2022  9:26 AM

## 2022-12-12 NOTE — PROGRESS NOTES
No changes noted in assessment. Medicated per request with dilaudid at 0115 and scheduled toradol given. Assisted to bathroom. No other needs expressed. Will continue to monitor.

## 2022-12-12 NOTE — PROGRESS NOTES
Assessment completed and documented. Wife at bedside, but getting ready to leave. Dressing CDI. Bowel sounds hypoactive. Denies needs. Will continue to monitor.

## 2022-12-12 NOTE — PROGRESS NOTES
PM assessment complete and documented. Meds given per MAR. Scheduled toradol given with 0.5 mg dilaudid. Abd dressing dry and intact. Pt denies nausea. No other needs expressed. Will continue to monitor.

## 2022-12-12 NOTE — PROGRESS NOTES
Hospitalist Progress Note      PCP: Eber Elena DO, DO    Date of Admission: 12/7/2022    Chief Complaint: Abd pain      Subjective:   Abd pain controlled. Denies n/v.  Passing flatus, but no BM yet  Otherwise no new acute complaints. Hgb stable. HypoK    Medications:  Reviewed    Infusion Medications    lactated ringers      dextrose      sodium chloride      sodium chloride Stopped (12/11/22 1505)     Scheduled Medications    potassium chloride  10 mEq IntraVENous Once    ketorolac  15 mg IntraVENous Q6H    pantoprazole  40 mg IntraVENous Daily    insulin lispro  0-4 Units SubCUTAneous TID WC    insulin lispro  0-4 Units SubCUTAneous Nightly    sodium chloride flush  5-40 mL IntraVENous 2 times per day    enoxaparin  40 mg SubCUTAneous Daily    piperacillin-tazobactam  3,375 mg IntraVENous Q8H     PRN Meds: HYDROmorphone **OR** HYDROmorphone, magnesium sulfate, potassium chloride **OR** potassium alternative oral replacement **OR** potassium chloride, dextrose bolus **OR** dextrose bolus, glucagon (rDNA), dextrose, sodium chloride flush, sodium chloride, ondansetron **OR** ondansetron, polyethylene glycol, acetaminophen **OR** acetaminophen, phenol, sodium chloride, bisacodyl, oxyCODONE **OR** oxyCODONE      Intake/Output Summary (Last 24 hours) at 12/12/2022 1303  Last data filed at 12/11/2022 1550  Gross per 24 hour   Intake 889.43 ml   Output --   Net 889.43 ml         Exam:    /72   Pulse 71   Temp 96.8 °F (36 °C) (Temporal)   Resp 15   Ht 5' 11\" (1.803 m)   Wt 192 lb 4.8 oz (87.2 kg)   SpO2 97%   BMI 26.82 kg/m²     Gen/overall appearance: Not in acute distress. Alert. Head: Normocephalic, atraumatic  Eyes: EOMI, no scleral icterus  CVS: regular rate and rhythm, Normal S1S2  Pulm: Clear to auscultation bilaterally. No crackles/wheezes  Gastrointestinal: Soft, surgical dresssing, no guarding or rebound  Extremities: No edema.  No erythema or warmth  Neuro: No gross focal deficits noted  Skin: Warm, dry    Labs:   Recent Labs     12/10/22  0534 12/11/22 0429 12/12/22 0449   WBC 12.8* 7.4 8.4   HGB 10.7* 9.4* 9.3*   HCT 34.1* 28.8* 28.6*    207 240       Recent Labs     12/10/22  0534 12/11/22  0429 12/12/22 0449    139 140   K 4.1 3.5 3.4*    104 104   CO2 24 27 31   BUN 16 17 15   CREATININE 0.8 0.8 0.8   CALCIUM 8.2* 8.4 8.5   PHOS 2.2* 2.3*  --        Recent Labs     12/10/22  0534 12/11/22 0429 12/12/22 0449   AST 29 28 20   ALT 14 19 18   BILITOT 0.6 0.7 0.7   ALKPHOS 68 64 65       No results for input(s): INR in the last 72 hours. No results for input(s): Donhermila Rands in the last 72 hours. Assessment/Plan:    Active Hospital Problems    Diagnosis Date Noted    SBO (small bowel obstruction) ([facility] [address] [K56.609] 12/01/2022     Priority: Medium       High Grade Small Bowel Obstruction s/p  small bowel/ascending colon resection with anastomosis 12/8/22  Peritoneal adhesions due to previous procedure  NG out  Diet advanced per GS  IVF fluid hydration; decrease with diet  Trend lytes and Cr  Pain management  On empiric zosyn  Post op care per     Acute blood loss anemia  Trend H&H  Transfuse for Hgb <7    Hypokalemia  Monitor and replace     Type II DM controlled  Hold oral agents  Sliding scale insulin    Hyperlipidemia  Hold statin for now    DVT Prophylaxis: lovenox  Diet: ADULT DIET;  Clear Liquid  Code Status: Full Code      Angle Sky MD

## 2022-12-12 NOTE — PROGRESS NOTES
[facility]   Phone: [phone]    Physical Therapy    [x] Initial Evaluation            [] Daily Treatment Note         [] Discharge Summary      Patient: Ailyn Hurst   : 1960   MRN: [id number]   Date of Service:  2022  Admitting Diagnosis: SBO (small bowel obstruction) ([facility] [address] Exp lap, lysis of adhesions, small  bowel and ascending colon resection with anastomosis on 22    Current Admission Summary: This is a 59-year-old male who presents with recurrent small bowel obstruction. He was recently admitted for this obstruction, which recovered with conservative management, but the patient returned less than a day after discharge with recurrent symptoms. With failure of medical management, the patient would need exploration with lysis of adhesions. Past Medical History:  has a past medical history of DM (diabetes mellitus) ([facility] [address] HLD (hyperlipidemia), and Tubulovillous adenoma of colon. Past Surgical History:  has a past surgical history that includes hemicolectomy and colectomy (N/A, 2022). Discharge Recommendations: Ailyn Hurst scored a 17/24 on the AM-PAC short mobility form. Current research shows that an AM-PAC score of 17 or less is typically not associated with a discharge to the patient's home setting. Based on the patient's AM-PAC score and their current functional mobility deficits, it is recommended that the patient have 5-7 sessions per week of Physical Therapy at d/c to increase the patient's independence. At this time, this patient demonstrates complex nursing, medical, and rehabilitative needs, and would benefit from intensive rehabilitation services upon discharge from the Inpatient setting.   This patient demonstrates the ability to participate in and benefit from an intensive therapy program with a coordinated interdisciplinary team approach to foster frequent, structured, and documented communication among disciplines, who will work together to establish, prioritize, and achieve treatment goals. Please see assessment section for further patient specific details. If patient discharges prior to next session this note will serve as a discharge summary. Please see below for the latest assessment towards goals. DME Required For Discharge: rolling walker, bedrail  Precautions/Restrictions: high fall risk    Required Braces/Orthotics: no braces required, but pt wears an AFO about 50% of the time, mostly at work; does not have here at the hospital   [x] Right  [] Left  Pt reports that he normally wears R AFO, related to old ankle injury  Positional Restrictions:no positional restrictions, pt instructed in log roll     Pre-Admission Information   Lives With: spouse    Type of Home: condo, senior living  Home Layout: one level  Home Access: level entry  Efficient Power Conversion: wheelchair accessible, walk in shower  Bathroom Equipment: grab bars in shower, grab bars around toilet, built in shower seat, hand held shower head  Toilet Height: elevated height  Home Equipment: rolling walker  Transfer Assistance: Independent without use of device  Ambulation Assistance:Independent without use of device  ADL Assistance: independent with all ADL's  IADL Assistance: independent with homemaking tasks  Active :        [x] Yes  [] No  Hand Dominance: [] Left  [x] Right  Current Employment: full time employment. 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 Subjective  General: Pt supine in bed upon arrival; agreeable to PT/OT  Pain: 8/10.   Location: Abdomen, surgical  Pain Interventions: pain medication in place prior to arrival       Functional Mobility  Bed Mobility  Supine to Sit: stand by assistance  Rolling Right: stand by assistance  Scooting: stand by assistance  Comments: readdressed log roll technique; HOB slightly elevated, bedrail  Transfers  Sit to stand transfer: stand by assistance  Stand to sit transfer: stand by assistance  Bed to chair transfer: stand by assistance  Comments: Gait belt donned. Patient holding onto IV pole for support. Ambulation  Surface:level surface  Assistive Device: rolling walker  Assistance: stand by assistance  Distance: 200', 75'  Gait Mechanics: flexed posture, dec'd cheryl, verbal cues to stand tall and remain closer to walker  Comments:  After ambulating 200' w/ RW, patient ambulated 76' w/o device w/ CGA. Stair Mobility  Stair mobility not completed on this date. Comments:  Balance  Static Sitting Balance: good: independent with functional balance in unsupported position  Dynamic Sitting Balance: fair (+): maintains balance at SBA/supervision without use of UE support  Static Standing Balance: fair (+): maintains balance at SBA/supervision without use of UE support  Dynamic Standing Balance: fair (-): maintains balance at CGA with use of UE support  Comments:    Other Therapeutic Interventions    Functional Outcomes  AM-PAC Inpatient Mobility Raw Score : 17              Cognition  WFL  Orientation:    alert and oriented x 4  Command Following:   [facility]    Education  Barriers To Learning: none  Patient Education: patient educated on goals, PT role and benefits, plan of care, functional mobility training, proper use of assistive device/equipment, discharge recommendations  Learning Assessment:  patient verbalizes understanding, would benefit from continued reinforcement    Assessment  Activity Tolerance: Strength, balance, and ambulation deficits noted s/p surgery.   Impairments Requiring Therapeutic Intervention: decreased functional mobility, decreased ROM, decreased strength, decreased endurance, increased pain, decreased posture  Prognosis: excellent  Clinical Assessment: Patient demonstrates weakness, balance deficits, and limited ambulation ability following surgical procedure. Recommend initial 24 hour assist and continued therapy at d/c.      Safety Interventions: patient left in chair, chair alarm in place, call light within reach, gait belt, patient at risk for falls, nurse notified, and family/caregiver present    Plan  Frequency: 3-5 x/per week  Current Treatment Recommendations: strengthening, ROM, balance training, functional mobility training, transfer training, gait training, and patient/caregiver education    Goals  Patient Goals: return home   Short Term Goals:  Time Frame: Discharge  Patient will complete bed mobility at modified independent   Patient will complete transfers at supervision   Patient will ambulate 150 ft with use of rolling walker at supervision    Therapy Session Time      Individual Group Co-treatment   Time In     1435   Time Out     1515   Minutes     40     Timed Code Treatment Minutes:  40 Minutes  Total Treatment Minutes:  40       Electronically Signed By: Zita Camara PT, DPT, [id number] [id number]

## 2022-12-12 NOTE — CARE COORDINATION
12/12/22 1134   Readmission Assessment   Previous Disposition Home with Family   Who is being Interviewed Patient   What was the patient's/caregiver's perception as to why they think they needed to return back to the hospital? Other (Comment)  (to have surgery.)   Did you visit your Primary Care Physician after you left the hospital, before you returned this time? No   Why weren't you able to visit your PCP? Did not have an appointment   Did you see a specialist, such as Cardiac, Pulmonary, Orthopedic Physician, etc. after you left the hospital? No   Who advised the patient to return to the hospital? Physician   Does the patient report anything that got in the way of taking their medications? No   In our efforts to provide the best possible care to you and others like you, can you think of anything that we could have done to help you after you left the hospital the first time, so that you might not have needed to return so soon? Other (Comment)  (Patient reported he believes he should not have been discharged as he left in even more pain than when he got here and had to come back the next day for surgery. )       CM discussed Physical Therapy and Occupational Therapy recommendations for the Acute Rehab Unit (ARU). CM discussed the ARU with the Patient and advised that Dr. Jose Sanches from the ARU would be coming to speak with him today about the ARU. Patient reported understanding and asked if he would be able to have his wife on the phone during the conversation and CM advised that would be no problems. Patient lives at home with Wife. Patient reported there are no steps to get inside the home and no steps once inside the home as it is a ranch style home.        Electronically signed by EDYTA Reinoso on 12/12/2022 at 11:37 AM

## 2022-12-13 LAB
A/G RATIO: 1.1 (ref 1.1–2.2)
ALBUMIN SERPL-MCNC: 2.9 G/DL (ref 3.4–5)
ALP BLD-CCNC: 66 U/L (ref 40–129)
ALT SERPL-CCNC: 18 U/L (ref 10–40)
ANION GAP SERPL CALCULATED.3IONS-SCNC: 8 MMOL/L (ref 3–16)
AST SERPL-CCNC: 14 U/L (ref 15–37)
BASOPHILS ABSOLUTE: 0.1 K/UL (ref 0–0.2)
BASOPHILS RELATIVE PERCENT: 1.1 %
BILIRUB SERPL-MCNC: 0.6 MG/DL (ref 0–1)
BUN BLDV-MCNC: 9 MG/DL (ref 7–20)
CALCIUM SERPL-MCNC: 7.9 MG/DL (ref 8.3–10.6)
CHLORIDE BLD-SCNC: 105 MMOL/L (ref 99–110)
CO2: 27 MMOL/L (ref 21–32)
CREAT SERPL-MCNC: 0.7 MG/DL (ref 0.8–1.3)
EOSINOPHILS ABSOLUTE: 0.2 K/UL (ref 0–0.6)
EOSINOPHILS RELATIVE PERCENT: 2.4 %
GFR SERPL CREATININE-BSD FRML MDRD: >60 ML/MIN/{1.73_M2}
GLUCOSE BLD-MCNC: 138 MG/DL (ref 70–99)
GLUCOSE BLD-MCNC: 179 MG/DL (ref 70–99)
GLUCOSE BLD-MCNC: 187 MG/DL (ref 70–99)
GLUCOSE BLD-MCNC: 197 MG/DL (ref 70–99)
GLUCOSE BLD-MCNC: 207 MG/DL (ref 70–99)
HCT VFR BLD CALC: 29.8 % (ref 40.5–52.5)
HEMOGLOBIN: 9.6 G/DL (ref 13.5–17.5)
LYMPHOCYTES ABSOLUTE: 3.8 K/UL (ref 1–5.1)
LYMPHOCYTES RELATIVE PERCENT: 39.1 %
MCH RBC QN AUTO: 24.3 PG (ref 26–34)
MCHC RBC AUTO-ENTMCNC: 32.4 G/DL (ref 31–36)
MCV RBC AUTO: 75.2 FL (ref 80–100)
MONOCYTES ABSOLUTE: 0.7 K/UL (ref 0–1.3)
MONOCYTES RELATIVE PERCENT: 7.5 %
NEUTROPHILS ABSOLUTE: 4.8 K/UL (ref 1.7–7.7)
NEUTROPHILS RELATIVE PERCENT: 49.9 %
PDW BLD-RTO: 16 % (ref 12.4–15.4)
PERFORMED ON: ABNORMAL
PLATELET # BLD: 255 K/UL (ref 135–450)
PMV BLD AUTO: 7.8 FL (ref 5–10.5)
POTASSIUM REFLEX MAGNESIUM: 3.7 MMOL/L (ref 3.5–5.1)
RBC # BLD: 3.96 M/UL (ref 4.2–5.9)
SODIUM BLD-SCNC: 140 MMOL/L (ref 136–145)
TOTAL PROTEIN: 5.6 G/DL (ref 6.4–8.2)
WBC # BLD: 9.7 K/UL (ref 4–11)

## 2022-12-13 PROCEDURE — 2580000003 HC RX 258: Performed by: SURGERY

## 2022-12-13 PROCEDURE — 2580000003 HC RX 258: Performed by: INTERNAL MEDICINE

## 2022-12-13 PROCEDURE — 1200000000 HC SEMI PRIVATE

## 2022-12-13 PROCEDURE — C9113 INJ PANTOPRAZOLE SODIUM, VIA: HCPCS | Performed by: NURSE PRACTITIONER

## 2022-12-13 PROCEDURE — 97535 SELF CARE MNGMENT TRAINING: CPT

## 2022-12-13 PROCEDURE — 6360000002 HC RX W HCPCS: Performed by: SURGERY

## 2022-12-13 PROCEDURE — 99024 POSTOP FOLLOW-UP VISIT: CPT | Performed by: SURGERY

## 2022-12-13 PROCEDURE — 85025 COMPLETE CBC W/AUTO DIFF WBC: CPT

## 2022-12-13 PROCEDURE — 80053 COMPREHEN METABOLIC PANEL: CPT

## 2022-12-13 PROCEDURE — 36415 COLL VENOUS BLD VENIPUNCTURE: CPT

## 2022-12-13 PROCEDURE — 6360000002 HC RX W HCPCS: Performed by: NURSE PRACTITIONER

## 2022-12-13 PROCEDURE — 94760 N-INVAS EAR/PLS OXIMETRY 1: CPT

## 2022-12-13 PROCEDURE — 6370000000 HC RX 637 (ALT 250 FOR IP): Performed by: SURGERY

## 2022-12-13 PROCEDURE — 97530 THERAPEUTIC ACTIVITIES: CPT

## 2022-12-13 RX ADMIN — HYDROMORPHONE HYDROCHLORIDE 1 MG: 1 INJECTION, SOLUTION INTRAMUSCULAR; INTRAVENOUS; SUBCUTANEOUS at 21:37

## 2022-12-13 RX ADMIN — KETOROLAC TROMETHAMINE 15 MG: 15 INJECTION, SOLUTION INTRAMUSCULAR; INTRAVENOUS at 04:32

## 2022-12-13 RX ADMIN — PIPERACILLIN AND TAZOBACTAM 3375 MG: 3; .375 INJECTION, POWDER, FOR SOLUTION INTRAVENOUS at 09:08

## 2022-12-13 RX ADMIN — PIPERACILLIN AND TAZOBACTAM 3375 MG: 3; .375 INJECTION, POWDER, FOR SOLUTION INTRAVENOUS at 00:37

## 2022-12-13 RX ADMIN — SODIUM CHLORIDE, POTASSIUM CHLORIDE, SODIUM LACTATE AND CALCIUM CHLORIDE: 600; 310; 30; 20 INJECTION, SOLUTION INTRAVENOUS at 16:01

## 2022-12-13 RX ADMIN — KETOROLAC TROMETHAMINE 15 MG: 15 INJECTION, SOLUTION INTRAMUSCULAR; INTRAVENOUS at 09:07

## 2022-12-13 RX ADMIN — Medication 10 ML: at 09:07

## 2022-12-13 RX ADMIN — KETOROLAC TROMETHAMINE 15 MG: 15 INJECTION, SOLUTION INTRAMUSCULAR; INTRAVENOUS at 21:37

## 2022-12-13 RX ADMIN — OXYCODONE 10 MG: 5 TABLET ORAL at 18:47

## 2022-12-13 RX ADMIN — PANTOPRAZOLE SODIUM 40 MG: 40 INJECTION, POWDER, FOR SOLUTION INTRAVENOUS at 09:07

## 2022-12-13 RX ADMIN — KETOROLAC TROMETHAMINE 15 MG: 15 INJECTION, SOLUTION INTRAMUSCULAR; INTRAVENOUS at 13:34

## 2022-12-13 RX ADMIN — PIPERACILLIN AND TAZOBACTAM 3375 MG: 3; .375 INJECTION, POWDER, FOR SOLUTION INTRAVENOUS at 16:02

## 2022-12-13 RX ADMIN — INSULIN LISPRO 1 UNITS: 100 INJECTION, SOLUTION INTRAVENOUS; SUBCUTANEOUS at 11:40

## 2022-12-13 RX ADMIN — ENOXAPARIN SODIUM 40 MG: 100 INJECTION SUBCUTANEOUS at 09:07

## 2022-12-13 ASSESSMENT — PAIN SCALES - GENERAL
PAINLEVEL_OUTOF10: 8
PAINLEVEL_OUTOF10: 4
PAINLEVEL_OUTOF10: 5
PAINLEVEL_OUTOF10: 2
PAINLEVEL_OUTOF10: 2
PAINLEVEL_OUTOF10: 5
PAINLEVEL_OUTOF10: 8

## 2022-12-13 ASSESSMENT — PAIN DESCRIPTION - LOCATION
LOCATION: ABDOMEN
LOCATION: ABDOMEN

## 2022-12-13 ASSESSMENT — PAIN DESCRIPTION - DESCRIPTORS: DESCRIPTORS: ACHING

## 2022-12-13 NOTE — PROGRESS NOTES
Hospitalist Progress Note      PCP: Karina Vasquez DO, DO    Date of Admission: 12/7/2022    Chief Complaint: Abd pain      Subjective:   Abd pain controlled. Denies n/v.  Passing flatus. No BM yet  No F/C  Hgb stable. HypoK    Medications:  Reviewed    Infusion Medications    lactated ringers 50 mL/hr at 12/12/22 1317    dextrose      sodium chloride      sodium chloride Stopped (12/11/22 1505)     Scheduled Medications    ketorolac  15 mg IntraVENous Q6H    pantoprazole  40 mg IntraVENous Daily    insulin lispro  0-4 Units SubCUTAneous TID WC    insulin lispro  0-4 Units SubCUTAneous Nightly    sodium chloride flush  5-40 mL IntraVENous 2 times per day    enoxaparin  40 mg SubCUTAneous Daily    piperacillin-tazobactam  3,375 mg IntraVENous Q8H     PRN Meds: HYDROmorphone **OR** HYDROmorphone, magnesium sulfate, potassium chloride **OR** potassium alternative oral replacement **OR** potassium chloride, dextrose bolus **OR** dextrose bolus, glucagon (rDNA), dextrose, sodium chloride flush, sodium chloride, ondansetron **OR** ondansetron, polyethylene glycol, acetaminophen **OR** acetaminophen, phenol, sodium chloride, bisacodyl, oxyCODONE **OR** oxyCODONE      Intake/Output Summary (Last 24 hours) at 12/13/2022 0949  Last data filed at 12/13/2022 0908  Gross per 24 hour   Intake 600 ml   Output --   Net 600 ml         Exam:    /72   Pulse 64   Temp 97.9 °F (36.6 °C) (Temporal)   Resp 18   Ht 5' 11\" (1.803 m)   Wt 192 lb 4.8 oz (87.2 kg)   SpO2 98%   BMI 26.82 kg/m²     Gen/overall appearance: Not in acute distress. Alert. Head: Normocephalic, atraumatic  Eyes: EOMI, no scleral icterus  CVS: regular rate and rhythm, Normal S1S2  Pulm: Clear to auscultation bilaterally. No crackles/wheezes  Gastrointestinal: Soft, surgical dresssing, no guarding or rebound  Extremities: No edema.  No erythema or warmth  Neuro: No gross focal deficits noted  Skin: Warm, dry    Labs:   Recent Labs 12/11/22 0429 12/12/22 0449 12/13/22 0427   WBC 7.4 8.4 9.7   HGB 9.4* 9.3* 9.6*   HCT 28.8* 28.6* 29.8*    240 255       Recent Labs     12/11/22 0429 12/12/22 0449 12/13/22 0427    140 140   K 3.5 3.4* 3.7    104 105   CO2 27 31 27   BUN 17 15 9   CREATININE 0.8 0.8 0.7*   CALCIUM 8.4 8.5 7.9*   PHOS 2.3*  --   --        Recent Labs     12/11/22 0429 12/12/22 0449 12/13/22 0427   AST 28 20 14*   ALT 19 18 18   BILITOT 0.7 0.7 0.6   ALKPHOS 64 65 66       No results for input(s): INR in the last 72 hours. No results for input(s): [place] in the last 72 hours. Assessment/Plan:    Active Hospital Problems    Diagnosis Date Noted    SBO (small bowel obstruction) ([facility] [address] [K56.609] 12/01/2022     Priority: Medium       High Grade Small Bowel Obstruction s/p  small bowel/ascending colon resection with anastomosis 12/8/22  Peritoneal adhesions due to previous procedure  ADAT  Tolerating clear liquids    Diet advanced per GS  IVF fluid hydration; decrease with diet  Trend lytes and Cr  Pain management  On empiric zosyn  Post op care per   Bowel function returned, passing stool  -monitor and replace electrolytes as needed  -analgesia prn  -Increase activity as able, PT/OT  -DC Antibiotics at DC  -anticipate DC in am  -discharge to ARU when able as surgery team can still follow    Acute blood loss anemia  Trend H&H  Transfuse for Hgb <7    Hypokalemia  Monitor and replace     Type II DM controlled  Hold oral agents  Sliding scale insulin    Hyperlipidemia  Hold statin for now    DVT Prophylaxis: lovenox  Diet: ADULT DIET;  Full Liquid  Code Status: Full Code      Marcella Jenkins MD

## 2022-12-13 NOTE — PROGRESS NOTES
[facility] [facility]   Phone: [phone]    Occupational Therapy    [] Initial Evaluation            [x] Daily Treatment Note         [] Discharge Summary      Patient: Anabel Romero   : 1960   MRN: [id number]   Date of Service:  2022    Admitting Diagnosis:  SBO (small bowel obstruction) ([address]  Current Admission Summary: SBO (small bowel obstruction) ([address] Exp lap, lysis of adhesions, small  bowel and ascending colon resection with anastomosis on 22  Past Medical History:  has a past medical history of DM (diabetes mellitus) ([address] HLD (hyperlipidemia), and Tubulovillous adenoma of colon. Past Surgical History:  has a past surgical history that includes hemicolectomy and colectomy (N/A, 2022). Discharge Recommendations: Anabel Romero scored a 17/24 on the AM-PAC ADL Inpatient form. Current research shows that an AM-PAC score of 17 or less is typically not associated with a discharge to the patient's home setting. Based on the patient's AM-PAC score and their current ADL deficits, it is recommended that the patient have 5-7 sessions per week of Occupational Therapy at d/c to increase the patient's independence. At this time, this patient demonstrates complex nursing, medical, and rehabilitative needs, and would benefit from intensive rehabilitation services upon discharge from the Inpatient setting. This patient demonstrates the ability to participate in and benefit from an intensive therapy program with a coordinated interdisciplinary team approach to foster frequent, structured, and documented communication among disciplines, who will work together to establish, prioritize, and achieve treatment goals. Please see assessment section for further patient specific details. If patient discharges prior to next session this note will serve as a discharge summary. Please see below for the latest assessment towards goals.        DME Required For Discharge: DME to be determined pending patient progress    Precautions/Restrictions: high fall risk  Weight Bearing Restrictions: no restrictions  [] Right Upper Extremity  [] Left Upper Extremity [] Right Lower Extremity  [] Left Lower Extremity     Required Braces/Orthotics: no braces required   [] Right  [] Left  Positional Restrictions:no positional restrictions    Pre-Admission Information   Lives With: spouse                  Type of Home: condo, senior living  Home Layout: one level  Home Access: level entry   45: wheelchair accessible, walk in shower  Bathroom Equipment: grab bars in shower, grab bars around toilet, built in shower seat, hand held shower head  Toilet Height: elevated height  Home Equipment: rolling walker  Transfer Assistance: Independent without use of device  Ambulation Assistance:Independent without use of device  ADL Assistance: independent with all ADL's  IADL Assistance: independent with homemaking tasks  Active :        [x] Yes                 [] No  Hand Dominance: [] Left                 [x] Right  Current Employment: full time employment. 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 Denies pain. Pain: 0/10  Pain Interventions: not applicable        Activities of Daily Living  Basic Activities of Daily Living  Grooming: stand by assistance  Grooming Comments: washed hands in stance at sink  Toileting: stand by assistance. Toileting Comments: completed pericare and LB clothing management  General Comments: declined additional ADL participation; reports prior completion     Instrumental Activities of Daily Living  No IADL completed on this date.     Functional Mobility  Bed Mobility  Supine to Sit: stand by assistance  Sit to Supine: stand by assistance  Scooting: supervision  Comments: HOB elevated  Transfers  Sit to stand transfer:stand by assistance  Stand to sit transfer: stand by assistance  Toilet transfer: stand by assistance  Toilet transfer comments: R grab bar  Comments: EOB>ambulating>toilet>ambulating>EOB    Functional Mobility:  Sitting Balance: supervision. Standing Balance: contact guard assistance. Functional Mobility: .  contact guard assistance  Functional Mobility Activity: to/from bathroom, +270 ft in hallway  Functional Mobility Device Use: no device  Functional Mobility Comment: mild unsteadiness with intermittent lateral sway resulting in corrective side stepping, especially to R    Other Therapeutic Interventions    Functional Outcomes  AM-PAC Inpatient Daily Activity Raw Score: 17    Cognition  WFL  Orientation:    alert and oriented x 4  Command Following:   [facility]     Education  Barriers To Learning: none  Patient Education: patient educated on goals, OT role and benefits, plan of care, transfer training, discharge recommendations  Learning Assessment:  patient verbalizes understanding, would benefit from continued reinforcement    Assessment  Activity Tolerance: tolerated well; some fatigue following   Impairments Requiring Therapeutic Intervention: decreased functional mobility, decreased ADL status, decreased strength, decreased safety awareness, decreased endurance, decreased balance  Prognosis: good  Clinical Assessment: Pt presenting below his functional baseline with the above deficits associated with SBO with bowel resection. Continued OT indicated in order to maximize safety and independence with all occupational pursuits.    Safety Interventions: patient left in bed, bed alarm in place, call light within reach, and nurse notified    Plan  Frequency: 3-5 x/per week  Current Treatment Recommendations: strengthening, balance training, functional mobility training, transfer training, gait training, endurance training, neuromuscular re-education, ADL/self-care training, and IADL training    Goals  Patient Goals: Return to home   Short Term Goals:  Time Frame: Discharge  Patient will complete upper body ADL at modified independent   Patient will complete lower body ADL at modified independent   Patient will complete toileting at modified independent   Patient will complete grooming at modified independent   Patient will complete functional transfers at modified independent     Continue goals 12/13    Therapy Session Time     Individual Group Co-treatment   Time In 1447     Time Out 1510     Minutes 23        Timed Code Treatment Minutes: 23 Minutes  Total Treatment Minutes:  23 minutes       Electronically Signed By: BIRGIT Ferguson/INDER C/NDT ([id number])

## 2022-12-13 NOTE — PROGRESS NOTES
[facility] Lapar[facility]        Post-op Note    Pt Name: Ajit Telles  MRN: [id number]  YOB: 1960  Date of Evaluation: 2022    Subjective:    No acute events overnight  Pain controlled  Tolerating clear liquids without nausea  Passing stool and flatus    Postoperative Day #5    Vital Signs:  Patient Vitals for the past 24 hrs:   BP Temp Temp src Pulse Resp SpO2   22 0432 128/74 98.2 °F (36.8 °C) Temporal 68 18 94 %   22 0051 136/77 98 °F (36.7 °C) Temporal 72 18 96 %   22 2000 (!) 99/55 98.2 °F (36.8 °C) Temporal 80 21 97 %   22 1720 (!) 153/84 98.9 °F (37.2 °C) Temporal 71 18 97 %   22 1108 137/72 96.8 °F (36 °C) Temporal 71 15 97 %   22 0848 (!) 154/79 99 °F (37.2 °C) Temporal -- -- 95 %      TEMPERATURE HISTORY 24H: Temp (24hrs), Av.2 °F (36.8 °C), Min:96.8 °F (36 °C), Max:99 °F (37.2 °C)    BLOOD PRESSURE HISTORY: Systolic (51EQG), GOO:055 , Min:99 , BGC:529    Diastolic (11XEL), UCM:52, Min:55, Max:86      Intake/Output:    I/O last 3 completed shifts: In: 240 [P.O.:240]  Out: -   No intake/output data recorded. Drain/tube Output:         Physical Exam:  General: awake, alert, oriented to  person, place, time  Abdomen: soft, non-distended, appropriate incisional tenderness, incision clean dry and intact    Labs:  CBC:    Recent Labs     22   WBC 7.4 8.4 9.7   HGB 9.4* 9.3* 9.6*   HCT 28.8* 28.6* 29.8*    240 255     BMP:    Recent Labs     22    140 140   K 3.5 3.4* 3.7    104 105   CO2 27 31 27   BUN 17 15 9   CREATININE 0.8 0.8 0.7*   GLUCOSE 131* 139* 187*     Hepatic:   Recent Labs     22   AST 28 20 14*   ALT 19 18 18   BILITOT 0.7 0.7 0.6   ALKPHOS 64 65 66     Amylase: No results for input(s): AMYLASE in the last 72 hours.   Lipase:   No results for input(s): LIPASE in the last 72 hours. Mag:      Recent Labs     12/11/22  0429 12/12/22  0449   MG 2.10 1.80      Phos:     Recent Labs     12/11/22  0429   PHOS 2.3*      Coags:   No results for input(s): INR, APTT in the last 72 hours. Cultures:  Anaerobic culture  No results for input(s): LABANAE in the last 72 hours. Blood culture  No results for input(s): BC in the last 72 hours. Blood culture 2  No results for input(s): BLOODCULT2 in the last 72 hours. Body fluid culture  No results for input(s): BLOODCULT2 in the last 72 hours. Surgical culture  No results for input(s): CXSURG in the last 72 hours. Fecal occult  No results for input(s): OCCULTBLDFEC in the last 72 hours. Gram stain  No results for input(s): LABGRAM in the last 72 hours. Stool culture 1  No results for input(s): CXST in the last 72 hours. Stool culture 2  No results for input(s): STOOLCULT2 in the last 72 hours. Urine culture  No results for input(s): LABURIN in the last 72 hours. Wound abscess  No results for input(s): WNDABS in the last 72 hours. C Diff   No results for input(s): CDIFFTOXAB in the last 72 hours. Pathology:  Pending    Imaging:  I have personally reviewed the following films:    No results found. Scheduled Meds:   ketorolac  15 mg IntraVENous Q6H    pantoprazole  40 mg IntraVENous Daily    insulin lispro  0-4 Units SubCUTAneous TID WC    insulin lispro  0-4 Units SubCUTAneous Nightly    sodium chloride flush  5-40 mL IntraVENous 2 times per day    enoxaparin  40 mg SubCUTAneous Daily    piperacillin-tazobactam  3,375 mg IntraVENous Q8H     Continuous Infusions:   lactated ringers 50 mL/hr at 12/12/22 1317    dextrose      sodium chloride      sodium chloride Stopped (12/11/22 1505)     PRN Meds:. HYDROmorphone **OR** HYDROmorphone, magnesium sulfate, potassium chloride **OR** potassium alternative oral replacement **OR** potassium chloride, dextrose bolus **OR** dextrose bolus, glucagon (rDNA), dextrose, sodium chloride flush, sodium chloride, ondansetron **OR** ondansetron, polyethylene glycol, acetaminophen **OR** acetaminophen, phenol, sodium chloride, bisacodyl, oxyCODONE **OR** oxyCODONE      Assessment:  Patient Active Problem List   Diagnosis    SBO (small bowel obstruction) ([facility] [address]     OR Date 12/8/2022, exploratory laparotomy, extensive lysis of adhesions (75 minutes), small bowel and ascending colon resection with anastomosis  Diabetes    Plan:  1. Bowel function returned, passing stool  2. Tolerating clear liquids, advance to full liquids  3. Monitor and replace electrolytes as needed  4. Pain control, transition to PO and minimize narcotics  5. Increase activity as able, PT/OT  6. Antibiotics, can stop at discharge  7. Defer management of remainder of medical comorbidities to primary and consulting teams  8. Discharge planning, anticipate tomorrow from surgical standpoint home but ARU pending, can discharge to ARU when able as surgery team can still follow    Lindsey Ruiz.  Berkley Romano MD, FACS  12/13/2022  8:03 AM

## 2022-12-13 NOTE — PLAN OF CARE
Problem: Pain  Goal: Verbalizes/displays adequate comfort level or baseline comfort level  12/12/2022 1951 by Serafin Huff RN  Outcome: Progressing     Problem: Skin/Tissue Integrity  Goal: Absence of new skin breakdown  Description: 1. Monitor for areas of redness and/or skin breakdown  2. Assess vascular access sites hourly  3. Every 4-6 hours minimum:  Change oxygen saturation probe site  4. Every 4-6 hours:  If on nasal continuous positive airway pressure, respiratory therapy assess nares and determine need for appliance change or resting period.   12/12/2022 1951 by Serafin Huff RN  Outcome: Progressing     Problem: Safety - Adult  Goal: Free from fall injury  12/12/2022 1951 by Serafin Huff RN  Outcome: Progressing

## 2022-12-14 LAB
A/G RATIO: 0.9 (ref 1.1–2.2)
ALBUMIN SERPL-MCNC: 2.4 G/DL (ref 3.4–5)
ALP BLD-CCNC: 61 U/L (ref 40–129)
ALT SERPL-CCNC: 13 U/L (ref 10–40)
ANION GAP SERPL CALCULATED.3IONS-SCNC: 9 MMOL/L (ref 3–16)
AST SERPL-CCNC: 11 U/L (ref 15–37)
BASOPHILS ABSOLUTE: 0 K/UL (ref 0–0.2)
BASOPHILS RELATIVE PERCENT: 0.4 %
BILIRUB SERPL-MCNC: 0.6 MG/DL (ref 0–1)
BUN BLDV-MCNC: 7 MG/DL (ref 7–20)
CALCIUM SERPL-MCNC: 7.9 MG/DL (ref 8.3–10.6)
CHLORIDE BLD-SCNC: 103 MMOL/L (ref 99–110)
CO2: 26 MMOL/L (ref 21–32)
CREAT SERPL-MCNC: 0.7 MG/DL (ref 0.8–1.3)
EOSINOPHILS ABSOLUTE: 0.3 K/UL (ref 0–0.6)
EOSINOPHILS RELATIVE PERCENT: 3.1 %
GFR SERPL CREATININE-BSD FRML MDRD: >60 ML/MIN/{1.73_M2}
GLUCOSE BLD-MCNC: 138 MG/DL (ref 70–99)
GLUCOSE BLD-MCNC: 174 MG/DL (ref 70–99)
GLUCOSE BLD-MCNC: 178 MG/DL (ref 70–99)
GLUCOSE BLD-MCNC: 184 MG/DL (ref 70–99)
GLUCOSE BLD-MCNC: 268 MG/DL (ref 70–99)
HCT VFR BLD CALC: 28.9 % (ref 40.5–52.5)
HEMOGLOBIN: 9.1 G/DL (ref 13.5–17.5)
LYMPHOCYTES ABSOLUTE: 4.4 K/UL (ref 1–5.1)
LYMPHOCYTES RELATIVE PERCENT: 40.4 %
MCH RBC QN AUTO: 23.8 PG (ref 26–34)
MCHC RBC AUTO-ENTMCNC: 31.6 G/DL (ref 31–36)
MCV RBC AUTO: 75.2 FL (ref 80–100)
MONOCYTES ABSOLUTE: 0.9 K/UL (ref 0–1.3)
MONOCYTES RELATIVE PERCENT: 8.4 %
NEUTROPHILS ABSOLUTE: 5.2 K/UL (ref 1.7–7.7)
NEUTROPHILS RELATIVE PERCENT: 47.7 %
PDW BLD-RTO: 16.5 % (ref 12.4–15.4)
PERFORMED ON: ABNORMAL
PLATELET # BLD: 257 K/UL (ref 135–450)
PMV BLD AUTO: 7.3 FL (ref 5–10.5)
POTASSIUM REFLEX MAGNESIUM: 3.7 MMOL/L (ref 3.5–5.1)
RBC # BLD: 3.84 M/UL (ref 4.2–5.9)
SODIUM BLD-SCNC: 138 MMOL/L (ref 136–145)
TOTAL PROTEIN: 5.1 G/DL (ref 6.4–8.2)
WBC # BLD: 10.8 K/UL (ref 4–11)

## 2022-12-14 PROCEDURE — 6360000002 HC RX W HCPCS: Performed by: SURGERY

## 2022-12-14 PROCEDURE — 85025 COMPLETE CBC W/AUTO DIFF WBC: CPT

## 2022-12-14 PROCEDURE — 36415 COLL VENOUS BLD VENIPUNCTURE: CPT

## 2022-12-14 PROCEDURE — 97535 SELF CARE MNGMENT TRAINING: CPT

## 2022-12-14 PROCEDURE — 80053 COMPREHEN METABOLIC PANEL: CPT

## 2022-12-14 PROCEDURE — APPNB30 APP NON BILLABLE TIME 0-30 MINS: Performed by: NURSE PRACTITIONER

## 2022-12-14 PROCEDURE — 97110 THERAPEUTIC EXERCISES: CPT

## 2022-12-14 PROCEDURE — 97530 THERAPEUTIC ACTIVITIES: CPT

## 2022-12-14 PROCEDURE — APPSS15 APP SPLIT SHARED TIME 0-15 MINUTES: Performed by: NURSE PRACTITIONER

## 2022-12-14 PROCEDURE — C9113 INJ PANTOPRAZOLE SODIUM, VIA: HCPCS | Performed by: NURSE PRACTITIONER

## 2022-12-14 PROCEDURE — 2580000003 HC RX 258: Performed by: SURGERY

## 2022-12-14 PROCEDURE — 6370000000 HC RX 637 (ALT 250 FOR IP): Performed by: SURGERY

## 2022-12-14 PROCEDURE — 6360000002 HC RX W HCPCS: Performed by: NURSE PRACTITIONER

## 2022-12-14 PROCEDURE — 1200000000 HC SEMI PRIVATE

## 2022-12-14 PROCEDURE — 99024 POSTOP FOLLOW-UP VISIT: CPT | Performed by: SURGERY

## 2022-12-14 RX ADMIN — PIPERACILLIN AND TAZOBACTAM 3375 MG: 3; .375 INJECTION, POWDER, FOR SOLUTION INTRAVENOUS at 15:47

## 2022-12-14 RX ADMIN — Medication 10 ML: at 08:07

## 2022-12-14 RX ADMIN — PIPERACILLIN AND TAZOBACTAM 3375 MG: 3; .375 INJECTION, POWDER, FOR SOLUTION INTRAVENOUS at 00:12

## 2022-12-14 RX ADMIN — HYDROMORPHONE HYDROCHLORIDE 1 MG: 1 INJECTION, SOLUTION INTRAMUSCULAR; INTRAVENOUS; SUBCUTANEOUS at 19:26

## 2022-12-14 RX ADMIN — ONDANSETRON 4 MG: 2 INJECTION INTRAMUSCULAR; INTRAVENOUS at 12:22

## 2022-12-14 RX ADMIN — PIPERACILLIN AND TAZOBACTAM 3375 MG: 3; .375 INJECTION, POWDER, FOR SOLUTION INTRAVENOUS at 08:07

## 2022-12-14 RX ADMIN — ENOXAPARIN SODIUM 40 MG: 100 INJECTION SUBCUTANEOUS at 08:07

## 2022-12-14 RX ADMIN — OXYCODONE 10 MG: 5 TABLET ORAL at 12:21

## 2022-12-14 RX ADMIN — HYDROMORPHONE HYDROCHLORIDE 1 MG: 1 INJECTION, SOLUTION INTRAMUSCULAR; INTRAVENOUS; SUBCUTANEOUS at 08:14

## 2022-12-14 RX ADMIN — INSULIN LISPRO 2 UNITS: 100 INJECTION, SOLUTION INTRAVENOUS; SUBCUTANEOUS at 17:19

## 2022-12-14 RX ADMIN — PANTOPRAZOLE SODIUM 40 MG: 40 INJECTION, POWDER, FOR SOLUTION INTRAVENOUS at 08:08

## 2022-12-14 ASSESSMENT — PAIN SCALES - GENERAL
PAINLEVEL_OUTOF10: 8
PAINLEVEL_OUTOF10: 8
PAINLEVEL_OUTOF10: 3
PAINLEVEL_OUTOF10: 8
PAINLEVEL_OUTOF10: 7
PAINLEVEL_OUTOF10: 0
PAINLEVEL_OUTOF10: 8

## 2022-12-14 ASSESSMENT — PAIN DESCRIPTION - LOCATION
LOCATION: ABDOMEN
LOCATION: ABDOMEN

## 2022-12-14 NOTE — PROGRESS NOTES
[facility] [address]   Phone: [phone]    Occupational Therapy    [] Initial Evaluation            [x] Daily Treatment Note         [] Discharge Summary      Patient: Grisel Santos   : 1960   MRN: [id number]   Date of Service:  2022    Admitting Diagnosis:  SBO (small bowel obstruction) ([address]    Current Admission Summary: SBO (small bowel obstruction) ([address] Exp lap, lysis of adhesions, small  bowel and ascending colon resection with anastomosis on 22  Past Medical History:  has a past medical history of DM (diabetes mellitus) ([facility] [address] HLD (hyperlipidemia), and Tubulovillous adenoma of colon. Past Surgical History:  has a past surgical history that includes hemicolectomy and colectomy (N/A, 2022). Date of Procedure: 2022   Pre-operative Diagnosis:  Small bowel obstruction   Post-operative Diagnosis: same    Procedure:  Exploratory laparotomy, extensive lysis of adhesions (75 minutes), small bowel and ascending colon resection with anastomosis   Surgeon(s):  Robby Infante MD       Discharge Recommendations: Grisel Santos scored a 20/24 on the AM-PAC ADL Inpatient form. Current research shows that an AM-PAC score of 17 or less is typically not associated with a discharge to the patient's home setting. Based on the patient's AM-PAC score and their current ADL deficits, it is recommended that the patient have 5-7 sessions per week of Occupational Therapy at d/c to increase the patient's independence. At this time, this patient demonstrates complex nursing, medical, and rehabilitative needs, and would benefit from intensive rehabilitation services upon discharge from the Inpatient setting.   This patient demonstrates the ability to participate in and benefit from an intensive therapy program with a coordinated interdisciplinary team approach to foster frequent, structured, and documented communication among disciplines, who will work together to establish, prioritize, and achieve treatment goals. Please see assessment section for further patient specific details. If patient discharges prior to next session this note will serve as a discharge summary. Please see below for the latest assessment towards goals. ARU has been consulted per CM notes, if patient is not able to be d/c to ARU then recommend home with home OT. Wife works from home and patient will have 24/7 care   Patient stated he is not back to baseline with ambulation and mobility, patient works 10 hour days, full time. Patient also requesting new strap for his right AFO due to velcro is worn out. DME Required For Discharge: DME to be determined pending patient progress    Precautions/Restrictions: medium fall risk, surgical protocols, up as tolerated  Weight Bearing Restrictions: no restrictions  [] Right Upper Extremity  [] Left Upper Extremity [] Right Lower Extremity  [] Left Lower Extremity     Required Braces/Orthotics: no braces required   [] Right  [] Left  Positional Restrictions:no positional restrictions    Pre-Admission Information   Lives With: spouse                  Type of Home: condo, senior living  Home Layout: one level  Home Access: level entry  [employer]: wheelchair accessible, walk in shower  Bathroom Equipment: grab bars in shower, grab bars around toilet, built in shower seat, hand held shower head  Toilet Height: elevated height  Home Equipment: rolling walker  Transfer Assistance: Independent without use of device  Ambulation Assistance:Independent without use of device  ADL Assistance: independent with all ADL's  IADL Assistance: independent with homemaking tasks  Active :        [x] Yes                 [] No  Hand Dominance: [] Left                 [x] Right  Current Employment: full time employment.   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 Denies pain. Pain: 6/10. Location: abdomen and 7/10. Location: 7 at beginning of session, 6 at end after ambulation  Pain Interventions: not applicable        Activities of Daily Living  Basic Activities of Daily Living  Feeding: Independent  Grooming: Independent  Grooming Comments: washed hands in stance at sink  Upper Extremity Bathing: setup assistance  Lower Extremity Dressing: minimal assistance  Toileting: Independent. Toileting Comments: urinated in stance at toilet indep   General Comments: declined LB bathing, stood at sink for UB bathing, gown change, grooming, min assist to don right AFO     Instrumental Activities of Daily Living  No IADL completed on this date. Functional Mobility  Bed Mobility  Supine to Sit: modified independent  Scooting: Independent  Comments: HOB elevated  Transfers  Sit to stand transfer:stand by assistance  Stand to sit transfer: stand by assistance  Bed / Chair transfer: stand by assistance. Comments: SBA with ambulation and mobility, patient wears a Right AFO. See PT notes for gait analysis, patient stated is not fully back to baseline for ambulation    Functional Mobility:  Sitting Balance: supervision. Standing Balance: stand by assistance, contact guard assistance. Functional Mobility: .  stand by assistance, contact guard assistance  Functional Mobility Activity: to/from bathroom, +270 ft in hallway  Functional Mobility Device Use: no device  Functional Mobility Comment: mild unsteadiness with intermittent lateral sway resulting in corrective side stepping, especially to R  due to has foot drop and wearing right AFO.      Other Therapeutic Interventions    Functional Outcomes  AM-PAC Inpatient Daily Activity Raw Score: 20    Cognition  WFL  Orientation:    alert and oriented x 4  Command Following:   [facility]     Education  Barriers To Learning: none  Patient Education: patient educated on goals, OT role and benefits, plan of care, transfer training, discharge recommendations  Learning Assessment:  patient verbalizes understanding, would benefit from continued reinforcement    Assessment  Activity Tolerance: tolerated well; some fatigue following   Impairments Requiring Therapeutic Intervention: decreased functional mobility, decreased ADL status, decreased strength, decreased safety awareness, decreased endurance, decreased balance  Prognosis: good  Clinical Assessment: Pt presenting below his functional baseline with the above deficits associated with SBO with bowel resection. Continued OT indicated in order to maximize safety and independence with all occupational pursuits. Safety Interventions: patient left in chair, call light within reach, and nurse notified    Plan  Frequency: 3-5 x/per week  Current Treatment Recommendations: strengthening, balance training, functional mobility training, transfer training, gait training, endurance training, neuromuscular re-education, ADL/self-care training, and IADL training    Goals  Patient Goals: Return to home   Short Term Goals:  Time Frame: Discharge  Patient will complete upper body ADL at modified independent   Patient will complete lower body ADL at modified independent   Patient will complete toileting at modified independent   Patient will complete grooming at modified independent   Patient will complete functional transfers at modified independent     Continue goals 12/13    Therapy Session Time     Individual Group Co-treatment   Time In 837     Time Out 902     Minutes 25        Timed Code Treatment Minutes: 25 Minutes  Total Treatment Minutes:  25 minutes       Electronically Signed By: Kimmie Kasper  [address] [place] [address]

## 2022-12-14 NOTE — PROGRESS NOTES
Hospitalist Progress Note      PCP: Jacqueline Villarreal DO, DO    Date of Admission: 12/7/2022    Chief Complaint: Abd pain      Subjective:   Abd pain controlled but exacerbated with more solid feeds. Denies n/v.  Passing flatus. No BM yet  No F/C      Medications:  Reviewed    Infusion Medications    lactated ringers 50 mL/hr at 12/13/22 1601    dextrose      sodium chloride      sodium chloride Stopped (12/11/22 1505)     Scheduled Medications    pantoprazole  40 mg IntraVENous Daily    insulin lispro  0-4 Units SubCUTAneous TID WC    insulin lispro  0-4 Units SubCUTAneous Nightly    sodium chloride flush  5-40 mL IntraVENous 2 times per day    enoxaparin  40 mg SubCUTAneous Daily    piperacillin-tazobactam  3,375 mg IntraVENous Q8H     PRN Meds: HYDROmorphone **OR** HYDROmorphone, magnesium sulfate, potassium chloride **OR** potassium alternative oral replacement **OR** potassium chloride, dextrose bolus **OR** dextrose bolus, glucagon (rDNA), dextrose, sodium chloride flush, sodium chloride, ondansetron **OR** ondansetron, polyethylene glycol, acetaminophen **OR** acetaminophen, phenol, sodium chloride, bisacodyl, oxyCODONE **OR** oxyCODONE      Intake/Output Summary (Last 24 hours) at 12/14/2022 1104  Last data filed at 12/13/2022 1357  Gross per 24 hour   Intake 240 ml   Output --   Net 240 ml         Exam:    BP (!) 157/87   Pulse 71   Temp 98.8 °F (37.1 °C) (Temporal)   Resp 18   Ht 5' 11\" (1.803 m)   Wt 192 lb 4.8 oz (87.2 kg)   SpO2 96%   BMI 26.82 kg/m²     Gen/overall appearance: Not in acute distress. Alert. Head: Normocephalic, atraumatic  Eyes: EOMI, no scleral icterus  CVS: regular rate and rhythm, Normal S1S2  Pulm: Clear to auscultation bilaterally. No crackles/wheezes  Gastrointestinal: Soft, surgical dresssing, no guarding or rebound  Extremities: No edema.  No erythema or warmth  Neuro: No gross focal deficits noted  Skin: Warm, dry    Labs:   Recent Labs     12/12/22  0368 12/13/22 0427 12/14/22 0458   WBC 8.4 9.7 10.8   HGB 9.3* 9.6* 9.1*   HCT 28.6* 29.8* 28.9*    255 257       Recent Labs     12/12/22 0449 12/13/22 0427 12/14/22 0458    140 138   K 3.4* 3.7 3.7    105 103   CO2 31 27 26   BUN 15 9 7   CREATININE 0.8 0.7* 0.7*   CALCIUM 8.5 7.9* 7.9*       Recent Labs     12/12/22 0449 12/13/22 0427 12/14/22 0458   AST 20 14* 11*   ALT 18 18 13   BILITOT 0.7 0.6 0.6   ALKPHOS 65 66 61       No results for input(s): INR in the last 72 hours. No results for input(s): Othelia Nutley in the last 72 hours. Assessment/Plan:    Active Hospital Problems    Diagnosis Date Noted    SBO (small bowel obstruction) ([facility] [address] [K56.609] 12/01/2022     Priority: Medium       High Grade Small Bowel Obstruction s/p  small bowel/ascending colon resection with anastomosis 12/8/22. 2.   Peritoneal adhesions due to previous procedure    -Tolerating clear liquids  -Diet advanced per GS  -VF fluid hydration; decrease with diet  Trend lytes and Cr  Pain management  -cont empiric zosyn  -Post op care per GS  -bowel function improving  -monitor and replace electrolytes as needed  -analgesia prn  -Increase activity as able  -continue PT/OT  -DC Antibiotics at DC  -anticipate DC in 24-48hrs  -discharge to ARU declined. Acute blood loss anemia  Trend H&H  Transfuse for Hgb <7    Hypokalemia  Monitor and replace     Type II DM controlled  Hold oral agents  Sliding scale insulin    Hyperlipidemia  Hold statin for now    DVT Prophylaxis: lovenox  Diet: ADULT ORAL NUTRITION SUPPLEMENT; Lunch; Standard High Calorie/High Protein Oral Supplement  ADULT DIET;  Clear Liquid  Code Status: Full Code      Juliette Harris MD

## 2022-12-14 NOTE — CONSULTS
Ajit Telles  12/14/2022  [id number]    Rehab Brief Consult:    Patient is too functional for ARU consideration. He would not be approved by [facility] with his therapy functional levels. Suggest home with Miguel  for continued therapies. We will sign off. Liaison notified CM on 12/13. Thank you for the consultation.     Aleksander Mcgarry MD 12/14/2022 9:36 AM

## 2022-12-14 NOTE — CARE COORDINATION
Discharge Planning:     (CM) discussed [address] [facility]) with the Patient and provided him a list of [facility] agencies. Patient advised he was interested in [facility] and that his wife and him would go over the list this evening when she got to the hospital. CM asked him to pick 3 and that she would get the options from him tomorrow morning.        Electronically signed by EDYTA Miller on 12/14/2022 at 1:27 PM

## 2022-12-14 NOTE — PROGRESS NOTES
[facility] Department   Phone: [phone]    Physical Therapy    [] Initial Evaluation            [x] Daily Treatment Note         [] Discharge Summary      Patient: Jose Miranda   : 1960   MRN: [id number]   Date of Service:  2022  Admitting Diagnosis: SBO (small bowel obstruction) ([facility] [address] Exp lap, lysis of adhesions, small  bowel and ascending colon resection with anastomosis on 22    Current Admission Summary: This is a 58-year-old male who presents with recurrent small bowel obstruction. He was recently admitted for this obstruction, which recovered with conservative management, but the patient returned less than a day after discharge with recurrent symptoms. With failure of medical management, the patient would need exploration with lysis of adhesions. Past Medical History:  has a past medical history of DM (diabetes mellitus) ([facility] [address] HLD (hyperlipidemia), and Tubulovillous adenoma of colon. Past Surgical History:  has a past surgical history that includes hemicolectomy and colectomy (N/A, 2022). Discharge Recommendations: Jose Miranda scored a 18/24 on the AM-PAC short mobility form. Current research shows that an AM-PAC score of 18 or greater is typically associated with a discharge to the patient's home setting. Based on the patient's AM-PAC score and their current functional mobility deficits, it is recommended that the patient have 2-3 sessions per week of Physical Therapy at d/c to increase the patient's independence. At this time, this patient demonstrates the endurance and safety to discharge home with home PT (home vs OP services) and a follow up treatment frequency of 2-3x/wk. Please see assessment section for further patient specific details. If patient discharges prior to next session this note will serve as a discharge summary. Please see below for the latest assessment towards goals.         DME Required For Discharge: rolling walker, bedrail  Precautions/Restrictions: high fall risk    Required Braces/Orthotics: no braces required, but pt wears an AFO about 50% of the time, mostly at work; does not have here at the hospital   [x] Right  [] Left  Pt reports that he normally wears R AFO, related to old ankle injury  Positional Restrictions:no positional restrictions, pt instructed in log roll     Pre-Admission Information   Lives With: spouse    Type of Home: condo, senior living  Home Layout: one level  Home Access: level entry  [employer]: wheelchair accessible, walk in [address]: grab bars in shower, grab bars around toilet, built in shower seat, hand held shower head  Toilet Height: elevated height  Home Equipment: rolling walker  Transfer Assistance: Independent without use of device  Ambulation Assistance:Independent without use of device  ADL Assistance: independent with all ADL's  IADL Assistance: independent with homemaking tasks  Active :        [x] Yes  [] No  Hand Dominance: [] Left  [x] Right  Current Employment: full time employment. 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 Subjective  General: Pt supine in bed upon arrival; agreeable to PT/OT  Pain: 4/10. Location: abdomen  Pain Interventions: pain medication in place prior to arrival       Functional Mobility  Bed Mobility  Supine to Sit: modified independent  Sit to Supine: modified independent  Rolling Right: modified independent  Scooting: modified independent  Comments: readdressed log roll technique  Transfers  Sit to stand transfer: supervision  Stand to sit transfer: supervision  Comments:t.   Ambulation  Surface:level surface  Assistive Device: no device  Assistance: supervision  Distance: 450  Gait Mechanics: flexed posture, dec'd cheryl, verbal cues to stand tall   Comments: Pt pushing IV pole in room but not in halls. Pt with AFO on right LE  Stair Mobility  Stair mobility not completed on this date. Comments:  Balance  Static Sitting Balance: good: independent with functional balance in unsupported position  Dynamic Sitting Balance: good: independent with functional balance in unsupported position  Static Standing Balance: good: independent with functional balance in unsupported position  Dynamic Standing Balance: fair (+): maintains balance at SBA/supervision without use of UE support  Comments:    Other Therapeutic Interventions  Needs assist with donning his shoes/AFO this date  Functional Outcomes  AM-PAC Inpatient Mobility Raw Score : 18              Cognition  WFL  Orientation:    alert and oriented x 4  Command Following:   [facility]    Education  Barriers To Learning: none  Patient Education: patient educated on goals, PT role and benefits, plan of care, functional mobility training, proper use of assistive device/equipment, discharge recommendations  Learning Assessment:  patient verbalizes understanding, would benefit from continued reinforcement    Assessment  Activity Tolerance: Strength, balance, and ambulation deficits noted s/p surgery. Impairments Requiring Therapeutic Intervention: decreased functional mobility, decreased ROM, decreased strength, decreased endurance, increased pain, decreased posture  Prognosis: excellent  Clinical Assessment:  Patient gait improving and his activity tolerance is as well. Progressing off RW this date.  Pt not an ARU candidate, recommend home PT    Safety Interventions: patient left in bed, call light within reach, gait belt, patient at risk for falls, nurse notified, and family/caregiver present    Plan  Frequency: 3-5 x/per week  Current Treatment Recommendations: strengthening, ROM, balance training, functional mobility training, transfer training, gait training, and patient/caregiver education    Goals  Patient Goals: return home   Short Term Goals:  Time Frame: Discharge  Patient will complete bed mobility at modified independent   Patient will complete transfers at supervision   Patient will ambulate 150 ft with use of rolling walker at supervision    Therapy Session Time      Individual Group Co-treatment   Time In 1428       Time Out 1444       Minutes 16         Timed Code Treatment Minutes:  16 Minutes  Total Treatment Minutes:  16         Electronically Signed By: [facility] PT [id number]

## 2022-12-14 NOTE — PROGRESS NOTES
[facility]        Post-op Note    Pt Name: Feliciano Chong  MRN: [id number]  YOB: 1960  Date of Evaluation: 2022    Subjective:    No acute events overnight  Pain controlled  No nausea or vomiting, tolerating full liquids but does reports bloating and increased pain following intake  Passing flatus, denies stool  Up to chair at this time    Postoperative Day #6      Vital Signs:  Patient Vitals for the past 24 hrs:   BP Temp Temp src Pulse Resp SpO2   22 0800 (!) 157/87 98.8 °F (37.1 °C) Temporal 71 18 96 %   22 2100 124/74 97.3 °F (36.3 °C) Temporal 68 18 96 %   22 1233 -- -- -- 71 22 96 %        TEMPERATURE HISTORY 24H: Temp (24hrs), Av.1 °F (36.7 °C), Min:97.3 °F (36.3 °C), Max:98.8 °F (37.1 °C)    BLOOD PRESSURE HISTORY: Systolic (66ARR), MYR:839 , Min:124 , ZXW:238    Diastolic (79ZBA), OLS:74, Min:72, Max:87      Intake/Output:    I/O last 3 completed shifts: In: 600 [P.O.:600]  Out: -   No intake/output data recorded. Drain/tube Output:         Physical Exam:  General: awake, alert, oriented to  person, place, time  Abdomen: soft, mild distension, appropriate incisional tenderness, bowel sounds hypoactive  Skin/Wound: healing well, no drainage, mild erythema noted along superior aspect of incision, well approximated with staples    Labs:  CBC:    Recent Labs     22   WBC 8.4 9.7 10.8   HGB 9.3* 9.6* 9.1*   HCT 28.6* 29.8* 28.9*    255 257       BMP:    Recent Labs     22    140 138   K 3.4* 3.7 3.7    105 103   CO2 31 27 26   BUN 15 9 7   CREATININE 0.8 0.7* 0.7*   GLUCOSE 139* 187* 184*       Hepatic:   Recent Labs     22  0449 22  0427 22  0458   AST 20 14* 11*   ALT 18 18 13   BILITOT 0.7 0.6 0.6   ALKPHOS 65 66 61       Amylase: No results for input(s): AMYLASE in the last 72 hours. Lipase:    No results for input(s): LIPASE in the last 72 hours. Mag:      Recent Labs     12/12/22  0449   MG 1.80        Phos:     No results for input(s): PHOS in the last 72 hours. Coags:   No results for input(s): INR, APTT in the last 72 hours. Cultures:  Anaerobic culture  No results for input(s): LABANAE in the last 72 hours. Blood culture  No results for input(s): BC in the last 72 hours. Blood culture 2  No results for input(s): BLOODCULT2 in the last 72 hours. Body fluid culture  No results for input(s): BLOODCULT2 in the last 72 hours. Surgical culture  No results for input(s): CXSURG in the last 72 hours. Fecal occult  No results for input(s): OCCULTBLDFEC in the last 72 hours. Gram stain  No results for input(s): LABGRAM in the last 72 hours. Stool culture 1  No results for input(s): CXST in the last 72 hours. Stool culture 2  No results for input(s): STOOLCULT2 in the last 72 hours. Urine culture  No results for input(s): LABURIN in the last 72 hours. Wound abscess  No results for input(s): WNDABS in the last 72 hours. C Diff   No results for input(s): CDIFFTOXAB in the last 72 hours. Pathology:  OR 12/8/2022--FINAL DIAGNOSIS:     Right colon and small intestine, resection:      - Small and large intestine with anastomosis and serosal adhesions. Imaging:  I have personally reviewed the following films:    No results found. Scheduled Meds:   pantoprazole  40 mg IntraVENous Daily    insulin lispro  0-4 Units SubCUTAneous TID WC    insulin lispro  0-4 Units SubCUTAneous Nightly    sodium chloride flush  5-40 mL IntraVENous 2 times per day    enoxaparin  40 mg SubCUTAneous Daily    piperacillin-tazobactam  3,375 mg IntraVENous Q8H     Continuous Infusions:   lactated ringers 50 mL/hr at 12/13/22 1601    dextrose      sodium chloride      sodium chloride Stopped (12/11/22 1505)     PRN Meds:. HYDROmorphone **OR** HYDROmorphone, magnesium sulfate, potassium chloride **OR** potassium alternative oral replacement **OR** potassium chloride, dextrose bolus **OR** dextrose bolus, glucagon (rDNA), dextrose, sodium chloride flush, sodium chloride, ondansetron **OR** ondansetron, polyethylene glycol, acetaminophen **OR** acetaminophen, phenol, sodium chloride, bisacodyl, oxyCODONE **OR** oxyCODONE      Assessment:  Patient Active Problem List   Diagnosis    SBO (small bowel obstruction) ([facility] [address]     OR Date 12/8/2022, exploratory laparotomy, extensive lysis of adhesions (75 minutes), small bowel and ascending colon resection with anastomosis  Diabetes      Plan:  1. Pain controlled, but reports both pain and bloating increased with PO intake, denies nausea or vomiting, passing flatus but denies stool, incision healing well--subtle area of erythema noted along superior aspect of incision, vitals/labs stable; continued supportive care and monitoring  2. Reduce to clear liquid diet as tolerated until bloating improves; monitor bowel function  3. IV hydration until PO intake is adequate; monitor and correct electrolytes  4. Antibiotics  5. Activity as tolerated, ambulate TID, up to chair for meals--PT/OT following  6. Pulmonary toilet, incentive spirometry  7. PRN analgesics and antiemetics--minimizing narcotics as tolerated  8. DVT prophylaxis with  Lovenox and SCD's  9. Management of medical comorbid etiologies per primary team and consulting services  10. Disposition: Anticipate discharge home in the next 24-48 hours    EDUCATION:  Educated patient on plan of care and disease process--all questions answered. Plans discussed with patient and nursing. Reviewed and discussed with Dr. Soco Silva. Signed:  MILLIE Burnett CNP  12/14/2022 10:56 AM    I have personally performed a face to face diagnostic evaluation on this patient. I have interviewed and examined the patient and I agree with the assessment above.  Time was spent reviewing patient chart (including but not limited to notes, labs, imaging and other testing), interviewing and counseling patient and present family members, performing physical exam, documentation of my findings and subsequent follow up of ordered medication and testing, placing referrals and communication with patient care providers, coordinating future care as well as documentation in the EHR. This encompassed more than 50% of the total encounter time. In summary, my findings and plan are the following:   Mr. Audra Reagan is a 60 y.o. male who presents with   OR Date 12/8/2022, exploratory laparotomy, extensive lysis of adhesions (75 minutes), small bowel and ascending colon resection with anastomosis  Diabetes     Plan:  1. Bowel function returned, passing stool  2. Bloating this morning, return to clear liquids and monitor  3. Monitor and replace electrolytes as needed  4. Pain control, transition to PO and minimize narcotics  5. Increase activity as able, PT/OT  6. Antibiotics, can stop at discharge  7. Defer management of remainder of medical comorbidities to primary and consulting teams  8. Discharge planning, not candidate for ARU, anticipate 1-3 days from surgical standpoint    Rey Silva MD, FACS  12/14/2022  11:17 AM

## 2022-12-15 LAB
A/G RATIO: 0.9 (ref 1.1–2.2)
ALBUMIN SERPL-MCNC: 2.7 G/DL (ref 3.4–5)
ALP BLD-CCNC: 61 U/L (ref 40–129)
ALT SERPL-CCNC: 12 U/L (ref 10–40)
ANION GAP SERPL CALCULATED.3IONS-SCNC: 5 MMOL/L (ref 3–16)
AST SERPL-CCNC: 7 U/L (ref 15–37)
BASOPHILS ABSOLUTE: 0 K/UL (ref 0–0.2)
BASOPHILS RELATIVE PERCENT: 0.4 %
BILIRUB SERPL-MCNC: 0.8 MG/DL (ref 0–1)
BUN BLDV-MCNC: 4 MG/DL (ref 7–20)
CALCIUM SERPL-MCNC: 8.7 MG/DL (ref 8.3–10.6)
CHLORIDE BLD-SCNC: 102 MMOL/L (ref 99–110)
CO2: 30 MMOL/L (ref 21–32)
CREAT SERPL-MCNC: 0.7 MG/DL (ref 0.8–1.3)
EOSINOPHILS ABSOLUTE: 0.3 K/UL (ref 0–0.6)
EOSINOPHILS RELATIVE PERCENT: 3.2 %
GFR SERPL CREATININE-BSD FRML MDRD: >60 ML/MIN/{1.73_M2}
GLUCOSE BLD-MCNC: 167 MG/DL (ref 70–99)
GLUCOSE BLD-MCNC: 171 MG/DL (ref 70–99)
GLUCOSE BLD-MCNC: 171 MG/DL (ref 70–99)
GLUCOSE BLD-MCNC: 173 MG/DL (ref 70–99)
GLUCOSE BLD-MCNC: 215 MG/DL (ref 70–99)
HCT VFR BLD CALC: 29.1 % (ref 40.5–52.5)
HEMOGLOBIN: 9.2 G/DL (ref 13.5–17.5)
LYMPHOCYTES ABSOLUTE: 3.7 K/UL (ref 1–5.1)
LYMPHOCYTES RELATIVE PERCENT: 38.1 %
MCH RBC QN AUTO: 23.6 PG (ref 26–34)
MCHC RBC AUTO-ENTMCNC: 31.6 G/DL (ref 31–36)
MCV RBC AUTO: 74.9 FL (ref 80–100)
MONOCYTES ABSOLUTE: 0.9 K/UL (ref 0–1.3)
MONOCYTES RELATIVE PERCENT: 9.6 %
NEUTROPHILS ABSOLUTE: 4.7 K/UL (ref 1.7–7.7)
NEUTROPHILS RELATIVE PERCENT: 48.7 %
PDW BLD-RTO: 16.4 % (ref 12.4–15.4)
PERFORMED ON: ABNORMAL
PLATELET # BLD: 302 K/UL (ref 135–450)
PMV BLD AUTO: 7.3 FL (ref 5–10.5)
POTASSIUM REFLEX MAGNESIUM: 3.7 MMOL/L (ref 3.5–5.1)
RBC # BLD: 3.88 M/UL (ref 4.2–5.9)
SODIUM BLD-SCNC: 137 MMOL/L (ref 136–145)
TOTAL PROTEIN: 5.6 G/DL (ref 6.4–8.2)
WBC # BLD: 9.7 K/UL (ref 4–11)

## 2022-12-15 PROCEDURE — C9113 INJ PANTOPRAZOLE SODIUM, VIA: HCPCS | Performed by: NURSE PRACTITIONER

## 2022-12-15 PROCEDURE — 6370000000 HC RX 637 (ALT 250 FOR IP): Performed by: SURGERY

## 2022-12-15 PROCEDURE — 36415 COLL VENOUS BLD VENIPUNCTURE: CPT

## 2022-12-15 PROCEDURE — 2580000003 HC RX 258: Performed by: SURGERY

## 2022-12-15 PROCEDURE — 6360000002 HC RX W HCPCS: Performed by: SURGERY

## 2022-12-15 PROCEDURE — 80053 COMPREHEN METABOLIC PANEL: CPT

## 2022-12-15 PROCEDURE — 99024 POSTOP FOLLOW-UP VISIT: CPT | Performed by: SURGERY

## 2022-12-15 PROCEDURE — APPNB30 APP NON BILLABLE TIME 0-30 MINS: Performed by: NURSE PRACTITIONER

## 2022-12-15 PROCEDURE — 1200000000 HC SEMI PRIVATE

## 2022-12-15 PROCEDURE — 6360000002 HC RX W HCPCS: Performed by: NURSE PRACTITIONER

## 2022-12-15 PROCEDURE — APPSS15 APP SPLIT SHARED TIME 0-15 MINUTES: Performed by: NURSE PRACTITIONER

## 2022-12-15 PROCEDURE — 2580000003 HC RX 258: Performed by: INTERNAL MEDICINE

## 2022-12-15 PROCEDURE — 85025 COMPLETE CBC W/AUTO DIFF WBC: CPT

## 2022-12-15 RX ORDER — HYDROCODONE BITARTRATE AND ACETAMINOPHEN 5; 325 MG/1; MG/1
1 TABLET ORAL EVERY 6 HOURS PRN
Qty: 15 TABLET | Refills: 0 | Status: SHIPPED | OUTPATIENT
Start: 2022-12-15 | End: 2022-12-22

## 2022-12-15 RX ORDER — AMOXICILLIN AND CLAVULANATE POTASSIUM 875; 125 MG/1; MG/1
1 TABLET, FILM COATED ORAL 2 TIMES DAILY
Qty: 14 TABLET | Refills: 0 | Status: SHIPPED | OUTPATIENT
Start: 2022-12-15 | End: 2022-12-22

## 2022-12-15 RX ADMIN — Medication 10 ML: at 07:47

## 2022-12-15 RX ADMIN — ACETAMINOPHEN 650 MG: 325 TABLET ORAL at 07:58

## 2022-12-15 RX ADMIN — ENOXAPARIN SODIUM 40 MG: 100 INJECTION SUBCUTANEOUS at 09:58

## 2022-12-15 RX ADMIN — SODIUM CHLORIDE, POTASSIUM CHLORIDE, SODIUM LACTATE AND CALCIUM CHLORIDE: 600; 310; 30; 20 INJECTION, SOLUTION INTRAVENOUS at 17:53

## 2022-12-15 RX ADMIN — PANTOPRAZOLE SODIUM 40 MG: 40 INJECTION, POWDER, FOR SOLUTION INTRAVENOUS at 07:46

## 2022-12-15 RX ADMIN — PIPERACILLIN AND TAZOBACTAM 3375 MG: 3; .375 INJECTION, POWDER, FOR SOLUTION INTRAVENOUS at 17:56

## 2022-12-15 RX ADMIN — PIPERACILLIN AND TAZOBACTAM 3375 MG: 3; .375 INJECTION, POWDER, FOR SOLUTION INTRAVENOUS at 07:51

## 2022-12-15 RX ADMIN — PIPERACILLIN AND TAZOBACTAM 3375 MG: 3; .375 INJECTION, POWDER, FOR SOLUTION INTRAVENOUS at 00:20

## 2022-12-15 RX ADMIN — ACETAMINOPHEN 650 MG: 325 TABLET ORAL at 00:17

## 2022-12-15 RX ADMIN — INSULIN LISPRO 1 UNITS: 100 INJECTION, SOLUTION INTRAVENOUS; SUBCUTANEOUS at 13:22

## 2022-12-15 ASSESSMENT — PAIN DESCRIPTION - LOCATION: LOCATION: HEAD

## 2022-12-15 ASSESSMENT — PAIN SCALES - GENERAL
PAINLEVEL_OUTOF10: 8
PAINLEVEL_OUTOF10: 7

## 2022-12-15 NOTE — CARE COORDINATION
Discharge note:      CM/SW has been notified of discharge. Patient noted to have the following needs at discharge. CM/SW has coordinated the following services: [facility])    [address]  [address] [facility] [address]  Phone: [phone]  Fax: [phone]             Discharge Destination: Home  Transportation: Wife to transport home. Comment: N/A      All CM/SW needs met, will sign off.       Electronically signed by EDYTA Curry on 12/15/2022 at 10:41 AM

## 2022-12-15 NOTE — CARE COORDINATION
[facility] home care referral. Spoke with pt and re: home care plan of care/services. Agreeable. Demographic's verified. Will follow for home care.

## 2022-12-15 NOTE — PROGRESS NOTES
[facility]        Post-op Note    Pt Name: Aaron Cantrell  MRN: [id number]  [place]: 1960  Date of Evaluation: 12/15/2022    Subjective:    No acute events overnight; nursing reports increased drainage from incision this morning  Pain controlled  No nausea or vomiting, tolerating clear liquids and no further bloating or increased pain following intake  Passing flatus and stool  Resting in bed at this time    Postoperative Day #7      Vital Signs:  Patient Vitals for the past 24 hrs:   BP Temp Temp src Pulse Resp SpO2 Weight   12/15/22 1136 -- 97.9 °F (36.6 °C) Temporal -- -- -- --   12/15/22 0743 126/71 99.2 °F (37.3 °C) Temporal 85 18 95 % --   12/15/22 0413 120/66 99 °F (37.2 °C) Temporal 84 16 96 % 192 lb 14.4 oz (87.5 kg)   12/15/22 0012 114/69 98.7 °F (37.1 °C) Temporal 82 16 93 % --   22 118/78 99.9 °F (37.7 °C) Temporal 90 16 92 % --   22 1722 124/74 98.7 °F (37.1 °C) Temporal -- -- -- --        TEMPERATURE HISTORY 24H: Temp (24hrs), Av.9 °F (37.2 °C), Min:97.9 °F (36.6 °C), Max:99.9 °F (37.7 °C)    BLOOD PRESSURE HISTORY: Systolic (69MFP), OZU:780 , Min:114 , SFW:735    Diastolic (63TUN), FZF:46, Min:66, Max:87      Intake/Output:    I/O last 3 completed shifts:   In: 1776 [I.V.:1423.1; IV Piggyback:375.8]  Out: -   I/O this shift:  In: 240 [P.O.:240]  Out: -   Drain/tube Output:         Physical Exam:  General: awake, alert, oriented to  person, place, time  Abdomen: soft, mild distension, appropriate incisional tenderness, bowel sounds active  Skin/Wound: mild erythema noted along superior aspect of incision now with purulent/bloody drainage--few staples removed from this area as well as lower aspect of wound, drainage from upper aspect only, both sites packed, remainder of incision remains well approximated with staples    Labs:  CBC:    Recent Labs     22  0427 22  0458 12/15/22  0622   WBC 9.7 10.8 9.7   HGB 9.6* 9.1* 9.2*   HCT 29.8* 28.9* 29.1*    257 302       BMP:    Recent Labs     12/13/22  0427 12/14/22  0458 12/15/22  0622    138 137   K 3.7 3.7 3.7    103 102   CO2 27 26 30   BUN 9 7 4*   CREATININE 0.7* 0.7* 0.7*   GLUCOSE 187* 184* 173*       Hepatic:   Recent Labs     12/13/22  0427 12/14/22  0458 12/15/22  0622   AST 14* 11* 7*   ALT 18 13 12   BILITOT 0.6 0.6 0.8   ALKPHOS 66 61 61       Amylase: No results for input(s): AMYLASE in the last 72 hours. Lipase:   No results for input(s): LIPASE in the last 72 hours. Mag:      No results for input(s): MG in the last 72 hours. Phos:     No results for input(s): PHOS in the last 72 hours. Coags:   No results for input(s): INR, APTT in the last 72 hours. Cultures:  Anaerobic culture  No results for input(s): LABANAE in the last 72 hours. Blood culture  No results for input(s): BC in the last 72 hours. Blood culture 2  No results for input(s): BLOODCULT2 in the last 72 hours. Body fluid culture  No results for input(s): BLOODCULT2 in the last 72 hours. Surgical culture  No results for input(s): CXSURG in the last 72 hours. Fecal occult  No results for input(s): OCCULTBLDFEC in the last 72 hours. Gram stain  No results for input(s): LABGRAM in the last 72 hours. Stool culture 1  No results for input(s): CXST in the last 72 hours. Stool culture 2  No results for input(s): STOOLCULT2 in the last 72 hours. Urine culture  No results for input(s): LABURIN in the last 72 hours. Wound abscess  No results for input(s): WNDABS in the last 72 hours. C Diff   No results for input(s): CDIFFTOXAB in the last 72 hours. Pathology:  OR 12/8/2022--FINAL DIAGNOSIS:   Right colon and small intestine, resection:      - Small and large intestine with anastomosis and serosal adhesions. Imaging:  I have personally reviewed the following films:    No results found.     Scheduled Meds:   pantoprazole  40 mg IntraVENous Daily    insulin lispro  0-4 Units SubCUTAneous TID WC    insulin lispro  0-4 Units SubCUTAneous Nightly    sodium chloride flush  5-40 mL IntraVENous 2 times per day    enoxaparin  40 mg SubCUTAneous Daily    piperacillin-tazobactam  3,375 mg IntraVENous Q8H     Continuous Infusions:   lactated ringers Stopped (12/14/22 1547)    dextrose      sodium chloride      sodium chloride Stopped (12/11/22 1505)     PRN Meds:. HYDROmorphone **OR** HYDROmorphone, magnesium sulfate, potassium chloride **OR** potassium alternative oral replacement **OR** potassium chloride, dextrose bolus **OR** dextrose bolus, glucagon (rDNA), dextrose, sodium chloride flush, sodium chloride, ondansetron **OR** ondansetron, polyethylene glycol, acetaminophen **OR** acetaminophen, phenol, sodium chloride, bisacodyl, oxyCODONE **OR** oxyCODONE      Assessment:  Patient Active Problem List   Diagnosis    SBO (small bowel obstruction) ([facility] [address]     OR Date 12/8/2022, exploratory laparotomy, extensive lysis of adhesions (75 minutes), small bowel and ascending colon resection with anastomosis  Diabetes      Plan:  1. Pain controlled, denies nausea or vomiting, passing flatus and stool, tolerating clear liquids without increased bloating/pain, now with small amount of purulent drainage from upper aspect of incision, low-grade fever up to 99.9, otherwise vitals/labs stable; continued supportive care and monitoring, wound partially opened and drained--local care with BID wet-to-dry dressings  2. Advance to regular diet as tolerated; monitor bowel function  3. IV hydration until PO intake is adequate; monitor and correct electrolytes  4. Antibiotics, switch to PO at discharge  5. Activity as tolerated, ambulate TID, up to chair for meals--PT/OT following  6. Pulmonary toilet, incentive spirometry  7. PRN analgesics and antiemetics--minimizing narcotics as tolerated  8. DVT prophylaxis with  Lovenox and SCD's  9.  Management of medical comorbid etiologies per primary team and consulting services  10. Disposition: Anticipate discharge home tomorrow if tolerating diet and wound appearance improved    EDUCATION:  Educated patient on plan of care and disease process--all questions answered. Plans discussed with patient and nursing. Reviewed and discussed with Dr. Carmen Jones. Signed:  Rodyani MILLIE Hartley - CNP  12/15/2022 12:52 PM    I have personally performed a face to face diagnostic evaluation on this patient. I have interviewed and examined the patient and I agree with the assessment above. Time was spent reviewing patient chart (including but not limited to notes, labs, imaging and other testing), interviewing and counseling patient and present family members, performing physical exam, documentation of my findings and subsequent follow up of ordered medication and testing, placing referrals and communication with patient care providers, coordinating future care as well as documentation in the EHR. This encompassed more than 50% of the total encounter time. In summary, my findings and plan are the following:   Mr. Johnathan Bullock is a 60 y.o. male who presents with   OR Date 12/8/2022, exploratory laparotomy, extensive lysis of adhesions (75 minutes), small bowel and ascending colon resection with anastomosis  Diabetes     Plan:  1. Bowel function returned, passing stool  2. Bloating improved, no nausea, advance diet  3. Monitor and replace electrolytes as needed  4. Pain control, transition to PO and minimize narcotics  5. Increase activity as able, PT/OT  6. Antibiotics, monitor midline wound and continue local wound care, switch to PO antibiotics at dischargecan stop at discharge  7. Defer management of remainder of medical comorbidities to primary and consulting teams  8. Discharge planning, anticipate 1-2 days from surgical standpoint    Rey Jones MD, FACS  12/15/2022  4:10 PM

## 2022-12-15 NOTE — PROGRESS NOTES
Sleeping between care. No changes noted. Abd incision with staples. Pt ambulated to bathroom. States pain is managed pretty well. Call light within reach. Denies needs at present. Will continue to monitor.

## 2022-12-15 NOTE — PROGRESS NOTES
Reassessment documented. No changes noted in care. Pt sleeping between care. Pt assisted to bathroom. Tolerated well. Medicated with tylenol for headache. Denies other needs or concerns at present. Will continue to monitor.

## 2022-12-15 NOTE — PROGRESS NOTES
PM assessment complete and documented. Meds given per MAR. Medicated per request with dilaudid for pain. Discussed with pt about switching to PO meds. Pt wants to get the pain he's currently feeling under control. Family visiting. Abd incision well approximated with staples intact. No drainage noted. No other needs expressed. Will continue to monitor.

## 2022-12-15 NOTE — DISCHARGE INSTR - COC
Continuity of Care Form    Patient Name: Ajit Telles   :  1960  MRN:  [id number]    Admit date:  2022  Discharge date:  ***    Code Status Order: Full Code   Advance Directives:     Admitting Physician:  Baldomero Allred MD  PCP: Viraj Conner DO, DO    Discharging Nurse: [facility] Unit/Room#: Y3F-7909/3850-05  Discharging Unit Phone Number: ***    Emergency Contact:   Extended Emergency Contact Information  Primary Emergency Contact: Petty [address] Phone: [phone]  Relation: Brother/Sister  Secondary Emergency Contact: Maria AntoniaEfraDiamond  Mobile Phone: [phone]  Relation: Spouse  Preferred language: English   needed?  No    Past Surgical History:  Past Surgical History:   Procedure Laterality Date    COLECTOMY N/A 2022    EXPLORATORY LAPAROTOMY LYSIS OF ADHESIONS SMALL BOWEL RESECTION performed by Aleida Tello MD at [address] [place] [address]         Immunization History:   Immunization History   Administered Date(s) Administered    COVID-19, PFIZER Bivalent BOOSTER, (age 12y+), IM, 30 mcg/0.3 mL dose 2022    COVID-19, PFIZER GRAY top, DO NOT Dilute, (age 15 y+), IM, 30 mcg/0.3 mL 2022    COVID-19, PFIZER PURPLE top, DILUTE for use, (age 15 y+), 30mcg/0.3mL 2021, 2021, 10/22/2021       Active Problems:  Patient Active Problem List   Diagnosis Code    SBO (small bowel obstruction) ([facility] [address] K56.609       Isolation/Infection:   Isolation            No Isolation          Patient Infection Status       Infection Onset Added Last Indicated Last Indicated By Review Planned Expiration Resolved Resolved By    None active    Resolved    C-diff Rule Out 22 Clostridium difficile toxin/antigen (Ordered)   12/10/22 Rule-Out Test Canceled            Nurse Assessment:  Last Vital Signs: /71   Pulse 85   Temp 99.2 °F (37.3 °C) (Temporal)   Resp 18   Ht 5' 11\" (1.803 m)   Wt 192 lb 14.4 oz (87.5 kg)   SpO2 95% BMI 26.90 kg/m²     Last documented pain score (0-10 scale): Pain Level: 8  Last Weight:   Wt Readings from Last 1 Encounters:   12/15/22 192 lb 14.4 oz (87.5 kg)     Mental Status:  {IP PT MENTAL STATUS:}    IV Access:  { PAT IV ACCESS:660820047}    Nursing Mobility/ADLs:  Walking   {CHP DME PZUL:199350571}  Transfer  {CHP DME QAAD:281664668}  Bathing  {CHP DME EJLU:803497135}  Dressing  {CHP DME QPMM:411958610}  Toileting  {CHP DME ZCRL:895668063}  Feeding  {CHP DME XHKY:537263508}  Med Admin  {P DME IPGE:667184734}  Med Delivery   { PAT MED Delivery:345893705}    Wound Care Documentation and Therapy:  Incision 22 Abdomen Anterior (Active)   Dressing Status Clean;Dry; Intact 12/15/22 0743   Dressing Change Due 12/10/22 12/11/22 0800   Incision Cleansed Not Cleansed 22 0800   Dressing/Treatment Open to air 12/15/22 0743   Closure Staples 12/15/22 0743   Margins Approximated 12/15/22 0743   Incision Assessment Other (Comment) 22 0014   Drainage Amount None 12/15/22 0743   Drainage Description Other (Comment) 22 1930   Odor None 12/15/22 0743   Number of days: 6        Elimination:  Continence: Bowel: {YES / MQ:35366}  Bladder: {YES / E}  Urinary Catheter: {Urinary Catheter:306127067}   Colostomy/Ileostomy/Ileal Conduit: {YES / PS:94110}       Date of Last BM: ***    Intake/Output Summary (Last 24 hours) at 12/15/2022 1040  Last data filed at 12/15/2022 0821  Gross per 24 hour   Intake 2038.95 ml   Output --   Net 2038.95 ml     I/O last 3 completed shifts:   In: 0493 [I.V.:1423.1; IV Piggyback:375.8]  Out: -     Safety Concerns:     [address] [employer] Safety Concerns:653891777}    Impairments/Disabilities:      [address] Impairments/Disabilities:822004993}    Nutrition Therapy:  Current Nutrition Therapy:   [address] Diet List:205849023}    Routes of Feeding: {CHP DME Other Feedings:322034772}  Liquids: {Slp liquid thickness:19539}  Daily Fluid Restriction: {CHP DME Yes amt QHODARS:031094739}  Last Modified Barium Swallow with Video (Video Swallowing Test): {Done Not Done GIVP:078906814}    Treatments at the Time of Hospital Discharge:   Respiratory Treatments: ***  Oxygen Therapy:  {Therapy; copd oxygen:54861}  Ventilator:    {MH CC Vent TBWU:649992075}    Rehab Therapies: PT,OT  Weight Bearing Status/Restrictions: {[facility] Weight Bearin}  Other Medical Equipment (for information only, NOT a DME order):  {EQUIPMENT:657568394}  Other Treatments: HOME HEALTH CARE: LEVEL 3 SAFETY       -Initial home health evaluation to occur within 24-48 hours, in patient home   -Home health agency to establish plan of care for patient over 60 day period   -Medication Reconciliation   -PT/OT/Speech evaluations in home within 24-48 hours of discharge; including  -DME and home safety   -Frontload therapy 5 days, then 3x a week   -OT to evaluate if patient has [address] needs for personal care   - evaluation within 24-48 hours, includes evaluation of resources   and insurance to determine [place], [place], LTC, and Medicaid options   -PCP Visit scheduled within three to seven days of discharge      Patient's personal belongings (please select all that are sent with patient):  {CHP DME Belongings:056452877}    RN SIGNATURE:  {Esignature:590088627}    CASE MANAGEMENT/SOCIAL WORK SECTION    Inpatient Status Date: 12/15/2022    Readmission Risk Assessment Score: 21%  Readmission Risk              Risk of Unplanned Readmission:  12           Discharging to Facility/ [address] [address])  [address] [address]  Phone: [phone]  Fax: [phone]             / signature: Electronically signed by EDYTA Reinoso on 12/15/2022 at 10:40 AM      PHYSICIAN SECTION    Prognosis: Good    Condition at Discharge: Stable    Rehab Potential (if transferring to Rehab): Good    Recommended Labs or Other Treatments After Discharge: ***    Physician Certification: I certify the above information and transfer of Aaron Cantrell  is necessary for the continuing treatment of the diagnosis listed and that he requires [address] for greater 30 days.      Update Admission H&P: No change in H&P    PHYSICIAN SIGNATURE:  Stacy Hill MD on 12/16/22

## 2022-12-15 NOTE — CARE COORDINATION
Discharge Planning:     (MILA) spoke with Patient and Patient's wife about the [address] [facility] [address] [facility]) list she provided to Patient yesterday. The couple provided the list back to CM and advised they really just wanted someone close to them and that would accept their insurance. CM made a referral to:    [address] [address]  Phone: [phone]  Fax: [phone]             Who advised she would review. CM got a phone call back a little later advising she could accept the Patient.      Electronically signed by EDYTA Herzog on 12/15/2022 at 10:39 AM

## 2022-12-16 VITALS
SYSTOLIC BLOOD PRESSURE: 127 MMHG | HEIGHT: 71 IN | WEIGHT: 187.7 LBS | BODY MASS INDEX: 26.28 KG/M2 | RESPIRATION RATE: 16 BRPM | TEMPERATURE: 99.3 F | HEART RATE: 72 BPM | DIASTOLIC BLOOD PRESSURE: 71 MMHG | OXYGEN SATURATION: 98 %

## 2022-12-16 LAB
A/G RATIO: 0.8 (ref 1.1–2.2)
ALBUMIN SERPL-MCNC: 2.6 G/DL (ref 3.4–5)
ALP BLD-CCNC: 66 U/L (ref 40–129)
ALT SERPL-CCNC: 13 U/L (ref 10–40)
ANION GAP SERPL CALCULATED.3IONS-SCNC: 9 MMOL/L (ref 3–16)
AST SERPL-CCNC: 11 U/L (ref 15–37)
BASOPHILS ABSOLUTE: 0 K/UL (ref 0–0.2)
BASOPHILS RELATIVE PERCENT: 0.5 %
BILIRUB SERPL-MCNC: 0.4 MG/DL (ref 0–1)
BUN BLDV-MCNC: 3 MG/DL (ref 7–20)
CALCIUM SERPL-MCNC: 8.8 MG/DL (ref 8.3–10.6)
CHLORIDE BLD-SCNC: 105 MMOL/L (ref 99–110)
CO2: 27 MMOL/L (ref 21–32)
CREAT SERPL-MCNC: 0.8 MG/DL (ref 0.8–1.3)
EOSINOPHILS ABSOLUTE: 0.3 K/UL (ref 0–0.6)
EOSINOPHILS RELATIVE PERCENT: 3.3 %
GFR SERPL CREATININE-BSD FRML MDRD: >60 ML/MIN/{1.73_M2}
GLUCOSE BLD-MCNC: 174 MG/DL (ref 70–99)
GLUCOSE BLD-MCNC: 180 MG/DL (ref 70–99)
GLUCOSE BLD-MCNC: 192 MG/DL (ref 70–99)
GLUCOSE BLD-MCNC: 198 MG/DL (ref 70–99)
GLUCOSE BLD-MCNC: 200 MG/DL (ref 70–99)
HCT VFR BLD CALC: 30.7 % (ref 40.5–52.5)
HEMOGLOBIN: 9.7 G/DL (ref 13.5–17.5)
LYMPHOCYTES ABSOLUTE: 3.9 K/UL (ref 1–5.1)
LYMPHOCYTES RELATIVE PERCENT: 41.8 %
MAGNESIUM: 1.4 MG/DL (ref 1.8–2.4)
MCH RBC QN AUTO: 23.7 PG (ref 26–34)
MCHC RBC AUTO-ENTMCNC: 31.4 G/DL (ref 31–36)
MCV RBC AUTO: 75.3 FL (ref 80–100)
MONOCYTES ABSOLUTE: 0.7 K/UL (ref 0–1.3)
MONOCYTES RELATIVE PERCENT: 7.9 %
NEUTROPHILS ABSOLUTE: 4.4 K/UL (ref 1.7–7.7)
NEUTROPHILS RELATIVE PERCENT: 46.5 %
PDW BLD-RTO: 16.3 % (ref 12.4–15.4)
PERFORMED ON: ABNORMAL
PLATELET # BLD: 351 K/UL (ref 135–450)
PMV BLD AUTO: 7.4 FL (ref 5–10.5)
POTASSIUM REFLEX MAGNESIUM: 3.5 MMOL/L (ref 3.5–5.1)
RBC # BLD: 4.08 M/UL (ref 4.2–5.9)
SODIUM BLD-SCNC: 141 MMOL/L (ref 136–145)
TOTAL PROTEIN: 5.9 G/DL (ref 6.4–8.2)
WBC # BLD: 9.4 K/UL (ref 4–11)

## 2022-12-16 PROCEDURE — 97535 SELF CARE MNGMENT TRAINING: CPT

## 2022-12-16 PROCEDURE — 2580000003 HC RX 258: Performed by: SURGERY

## 2022-12-16 PROCEDURE — APPNB30 APP NON BILLABLE TIME 0-30 MINS: Performed by: NURSE PRACTITIONER

## 2022-12-16 PROCEDURE — C9113 INJ PANTOPRAZOLE SODIUM, VIA: HCPCS | Performed by: NURSE PRACTITIONER

## 2022-12-16 PROCEDURE — 6360000002 HC RX W HCPCS: Performed by: SURGERY

## 2022-12-16 PROCEDURE — 97116 GAIT TRAINING THERAPY: CPT

## 2022-12-16 PROCEDURE — APPSS15 APP SPLIT SHARED TIME 0-15 MINUTES: Performed by: NURSE PRACTITIONER

## 2022-12-16 PROCEDURE — 85025 COMPLETE CBC W/AUTO DIFF WBC: CPT

## 2022-12-16 PROCEDURE — 99024 POSTOP FOLLOW-UP VISIT: CPT | Performed by: SURGERY

## 2022-12-16 PROCEDURE — 80053 COMPREHEN METABOLIC PANEL: CPT

## 2022-12-16 PROCEDURE — 1200000000 HC SEMI PRIVATE

## 2022-12-16 PROCEDURE — 83735 ASSAY OF MAGNESIUM: CPT

## 2022-12-16 PROCEDURE — 6360000002 HC RX W HCPCS: Performed by: NURSE PRACTITIONER

## 2022-12-16 PROCEDURE — 36415 COLL VENOUS BLD VENIPUNCTURE: CPT

## 2022-12-16 RX ORDER — MAGNESIUM SULFATE IN WATER 40 MG/ML
4000 INJECTION, SOLUTION INTRAVENOUS ONCE
Status: COMPLETED | OUTPATIENT
Start: 2022-12-16 | End: 2022-12-16

## 2022-12-16 RX ADMIN — ENOXAPARIN SODIUM 40 MG: 100 INJECTION SUBCUTANEOUS at 09:29

## 2022-12-16 RX ADMIN — PANTOPRAZOLE SODIUM 40 MG: 40 INJECTION, POWDER, FOR SOLUTION INTRAVENOUS at 09:29

## 2022-12-16 RX ADMIN — PIPERACILLIN AND TAZOBACTAM 3375 MG: 3; .375 INJECTION, POWDER, FOR SOLUTION INTRAVENOUS at 16:59

## 2022-12-16 RX ADMIN — MAGNESIUM SULFATE HEPTAHYDRATE 4000 MG: 40 INJECTION, SOLUTION INTRAVENOUS at 09:39

## 2022-12-16 RX ADMIN — PIPERACILLIN AND TAZOBACTAM 3375 MG: 3; .375 INJECTION, POWDER, FOR SOLUTION INTRAVENOUS at 09:37

## 2022-12-16 RX ADMIN — Medication 10 ML: at 09:29

## 2022-12-16 RX ADMIN — Medication 10 ML: at 22:27

## 2022-12-16 RX ADMIN — SODIUM CHLORIDE 20 ML/HR: 9 INJECTION, SOLUTION INTRAVENOUS at 09:37

## 2022-12-16 RX ADMIN — PIPERACILLIN AND TAZOBACTAM 3375 MG: 3; .375 INJECTION, POWDER, FOR SOLUTION INTRAVENOUS at 00:59

## 2022-12-16 NOTE — PROGRESS NOTES
Introduced myself to patient. Discussed plan for today. Verbalized understanding. Will continue to monitor.

## 2022-12-16 NOTE — PLAN OF CARE
Problem: Discharge Planning  Goal: Discharge to home or other facility with appropriate resources  Outcome: Progressing     Problem: Pain  Goal: Verbalizes/displays adequate comfort level or baseline comfort level  Outcome: Progressing     Problem: Skin/Tissue Integrity  Goal: Absence of new skin breakdown  Description: 1. Monitor for areas of redness and/or skin breakdown  2. Assess vascular access sites hourly  3. Every 4-6 hours minimum:  Change oxygen saturation probe site  4. Every 4-6 hours:  If on nasal continuous positive airway pressure, respiratory therapy assess nares and determine need for appliance change or resting period.   Outcome: Progressing     Problem: Safety - Adult  Goal: Free from fall injury  12/15/2022 2237 by Zoe Momin RN  Outcome: Progressing  12/15/2022 1813 by Brandon Padron RN  Outcome: Progressing     Problem: ABCDS Injury Assessment  Goal: Absence of physical injury  Outcome: Progressing     Problem: Chronic Conditions and Co-morbidities  Goal: Patient's chronic conditions and co-morbidity symptoms are monitored and maintained or improved  Outcome: Progressing     Problem: Nutrition Deficit:  Goal: Optimize nutritional status  Outcome: Progressing

## 2022-12-16 NOTE — PROGRESS NOTES
[facility]   Phone: [phone]    Physical Therapy    [] Initial Evaluation            [x] Daily Treatment Note         [] Discharge Summary      Patient: Bucky Dwyer   : 1960   MRN: [id number]   Date of Service:  2022  Admitting Diagnosis: SBO (small bowel obstruction) ([facility] [address] Exp lap, lysis of adhesions, small  bowel and ascending colon resection with anastomosis on 22    Current Admission Summary: This is a 59-year-old male who presents with recurrent small bowel obstruction. He was recently admitted for this obstruction, which recovered with conservative management, but the patient returned less than a day after discharge with recurrent symptoms. With failure of medical management, the patient would need exploration with lysis of adhesions. Past Medical History:  has a past medical history of DM (diabetes mellitus) ([facility] [address] HLD (hyperlipidemia), and Tubulovillous adenoma of colon. Past Surgical History:  has a past surgical history that includes hemicolectomy and colectomy (N/A, 2022). Discharge Recommendations: Bucky Dwyer scored a 20/24 on the AM-PAC short mobility form. Current research shows that an AM-PAC score of 18 or greater is typically associated with a discharge to the patient's home setting. Based on the patient's AM-PAC score and their current functional mobility deficits, it is recommended that the patient have 2-3 sessions per week of Physical Therapy at d/c to increase the patient's independence. At this time, this patient demonstrates the endurance and safety to discharge home with home PT (home vs OP services) and a follow up treatment frequency of 2-3x/wk. Please see assessment section for further patient specific details. If patient discharges prior to next session this note will serve as a discharge summary. Please see below for the latest assessment towards goals.     HOME HEALTH CARE: LEVEL 1 STANDARD    - Initial home health evaluation to occur within 24-48 hours, in patient home   - Therapy to evaluate with goal of regaining prior level of functioning   - Therapy to evaluate if patient has [address] needs for personal care      DME Required For Discharge: rolling walker, bedrail  Precautions/Restrictions: high fall risk, up as tolerated    Required Braces/Orthotics: no braces required, but pt wears an AFO about 50% of the time, mostly at work; does not have here at the hospital   [x] Right  [] Left  Pt reports that he normally wears R AFO, related to old ankle injury  Positional Restrictions:no positional restrictions, pt instructed in log roll     Pre-Admission Information   Lives With: spouse    Type of Home: condo, senior living  Home Layout: one level  Home Access: level entry  [facility]: wheelchair accessible, walk in shower  Bathroom Equipment: grab bars in shower, grab bars around toilet, built in shower seat, hand held shower head  Toilet Height: elevated height  Home Equipment: rolling walker  Transfer Assistance: Independent without use of device  Ambulation Assistance:Independent without use of device  ADL Assistance: independent with all ADL's  IADL Assistance: independent with homemaking tasks  Active :        [x] Yes  [] No  Hand Dominance: [] Left  [x] Right  Current Employment: full time employment. 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   Pain: 0/10- pt denies pain  Pain Interventions: not applicable     Functional Mobility  Bed Mobility  Supine to Sit: modified independent  Sit to Supine: modified independent  Scooting: modified independent- toward EOB  Comments: Supine to/from sit: HOB elevated, use of bed rail  Transfers  Sit to stand transfer: supervision  Stand to sit transfer: supervision  Comments:  Ambulation  Surface:level surface  Assistive Device: no device  Assistance: supervision  Distance: 540'  Gait Mechanics: flexed posture, dec'd cheryl, decreased step lengths and heights, mild R foot drop, steady with no losses of balance  Comments:   Stair Mobility  Stair mobility not completed on this date. Comments:  Balance  Static Sitting Balance: good: independent with functional balance in unsupported position  Dynamic Sitting Balance: good: independent with functional balance in unsupported position  Static Standing Balance: good: independent with functional balance in unsupported position  Dynamic Standing Balance: fair (+): maintains balance at SBA/supervision without use of UE support  Comments:    Other Therapeutic Interventions    Functional Outcomes  AM-PAC Inpatient Mobility Raw Score : 20              Cognition  WFL  Orientation:    alert and oriented x 4  Command Following:   [facility]    Education  Barriers To Learning: none  Patient Education: patient educated on goals, PT role and benefits, plan of care, functional mobility training  Learning Assessment:  patient verbalizes and demonstrates understanding    Assessment  Activity Tolerance: Pt tolerated the PT treatment session well with no significant limitations  Impairments Requiring Therapeutic Intervention: decreased functional mobility, decreased ROM, decreased strength, decreased endurance, increased pain, decreased posture  Prognosis: excellent  Clinical Assessment: Pt presents today with slight improvement in functional mobility. Pt was able to increase his ambulation distance and does not require any physical assistance for performance of functional mobility tasks. Pt ambulates with steady gait however demonstrates trunk flexion throughout the gait cycle for which the patient attributes to lower back pain that he has had for many years.  Pt may benefit from outpatient PT to improve hs low back pain and posture. Pt will continue to benefit from skilled PT to facilitate return to PLOF and to promote independence.     Safety Interventions: patient left in bed, call light within reach, gait belt, and nurse notified- pt up ad capo in room, RN aware    Plan  Frequency: 3-5 x/per week  Current Treatment Recommendations: strengthening, ROM, balance training, functional mobility training, transfer training, gait training, and patient/caregiver education    Goals  Patient Goals: return home   Short Term Goals:  Time Frame: Discharge  Patient will complete bed mobility at [facility] independent- met 12/16/22   Patient will complete transfers at supervision- met 12/16/22    Patient will ambulate 150 ft with use of rolling walker at supervision- met 12/16/22   Patient will complete bed mobility at independent  Patient will complete transfers at independent  Patient will ambulate at least 400 ft without an assistive device at independent  *Three goals met and updated 12/16/22      Therapy Session Time      Individual Group Co-treatment   Time In 1435       Time Out 1450       Minutes 15         Timed Code Treatment Minutes: 15 Minutes  Total Treatment Minutes: 15 Minutes         Electronically Signed By: Susy Alba PT, DPT [id number]

## 2022-12-16 NOTE — PROGRESS NOTES
[facility] and Laparoscopic [facility]        Post-op Note    Pt Name: Ajit Telles  MRN: [id number]  [place]: 1960  Date of Evaluation: 2022    Subjective:    No acute events overnight  Pain controlled  No nausea or vomiting, tolerating regular diet  Passing flatus and stool  Resting in bed at this time    Postoperative Day #8      Vital Signs:  Patient Vitals for the past 24 hrs:   BP Temp Temp src Pulse Resp SpO2 Weight   22 0900 125/79 99 °F (37.2 °C) Temporal 77 16 97 % --   22 0410 132/74 98.3 °F (36.8 °C) Temporal 86 16 96 % 187 lb 11.2 oz (85.1 kg)   22 0030 -- -- -- -- -- 97 % --   22 0014 127/73 98.6 °F (37 °C) Temporal 76 16 98 % --   12/15/22 2013 121/67 98.6 °F (37 °C) Temporal 88 16 99 % --   12/15/22 1714 -- -- -- 84 -- -- --   12/15/22 1649 (!) 107/58 98.5 °F (36.9 °C) Temporal 83 16 98 % --   12/15/22 1400 -- -- -- 77 -- -- --   12/15/22 1345 (!) 97/52 98.7 °F (37.1 °C) Temporal 76 16 96 % --   12/15/22 1212 -- -- -- 86 -- -- --   12/15/22 1136 -- 97.9 °F (36.6 °C) Temporal -- -- -- --        TEMPERATURE HISTORY 24H: Temp (24hrs), Av.5 °F (36.9 °C), Min:97.9 °F (36.6 °C), Max:99 °F (37.2 °C)    BLOOD PRESSURE HISTORY: Systolic (89EZX), OVF:175 , Min:97 , TREY:904    Diastolic (95ORX), WVN:78, Min:52, Max:79      Intake/Output:    I/O last 3 completed shifts: In: 240 [P.O.:240]  Out: 900 [Urine:900]  No intake/output data recorded.   Drain/tube Output:         Physical Exam:  General: awake, alert, oriented to  person, place, time  Abdomen: soft, mild distension, appropriate incisional tenderness, bowel sounds active  Skin/Wound: improved/decreased erythema along superior aspect of incision, no further drainage from open areas, remainder of incision remains well approximated with staples--dressing changed    Labs:  CBC:    Recent Labs     22  0458 12/15/22  0622 22  0437   WBC 10.8 9.7 9.4   HGB 9.1* 9.2* 9.7*   HCT 28.9* 29.1* 30.7*    302 351       BMP:    Recent Labs     12/14/22 0458 12/15/22  0622 12/16/22  0437    137 141   K 3.7 3.7 3.5    102 105   CO2 26 30 27   BUN 7 4* 3*   CREATININE 0.7* 0.7* 0.8   GLUCOSE 184* 173* 198*       Hepatic:   Recent Labs     12/14/22 0458 12/15/22  0622 12/16/22  0437   AST 11* 7* 11*   ALT 13 12 13   BILITOT 0.6 0.8 0.4   ALKPHOS 61 61 66       Amylase: No results for input(s): AMYLASE in the last 72 hours. Lipase:   No results for input(s): LIPASE in the last 72 hours. Mag:      Recent Labs     12/16/22 0437   MG 1.40*        Phos:     No results for input(s): PHOS in the last 72 hours. Coags:   No results for input(s): INR, APTT in the last 72 hours. Cultures:  Anaerobic culture  No results for input(s): LABANAE in the last 72 hours. Blood culture  No results for input(s): BC in the last 72 hours. Blood culture 2  No results for input(s): BLOODCULT2 in the last 72 hours. Body fluid culture  No results for input(s): BLOODCULT2 in the last 72 hours. Surgical culture  No results for input(s): CXSURG in the last 72 hours. Fecal occult  No results for input(s): OCCULTBLDFEC in the last 72 hours. Gram stain  No results for input(s): LABGRAM in the last 72 hours. Stool culture 1  No results for input(s): CXST in the last 72 hours. Stool culture 2  No results for input(s): STOOLCULT2 in the last 72 hours. Urine culture  No results for input(s): LABURIN in the last 72 hours. Wound abscess  No results for input(s): WNDABS in the last 72 hours. C Diff   No results for input(s): CDIFFTOXAB in the last 72 hours. Pathology:  OR 12/8/2022--FINAL DIAGNOSIS:   Right colon and small intestine, resection:      - Small and large intestine with anastomosis and serosal adhesions. Imaging:  I have personally reviewed the following films:    No results found.     Scheduled Meds:   magnesium sulfate  4,000 mg IntraVENous Once    pantoprazole  40 mg IntraVENous Daily    insulin lispro  0-4 Units SubCUTAneous TID WC    insulin lispro  0-4 Units SubCUTAneous Nightly    sodium chloride flush  5-40 mL IntraVENous 2 times per day    enoxaparin  40 mg SubCUTAneous Daily    piperacillin-tazobactam  3,375 mg IntraVENous Q8H     Continuous Infusions:   lactated ringers 50 mL/hr at 12/15/22 1753    dextrose      sodium chloride      sodium chloride 20 mL/hr (12/16/22 0937)     PRN Meds:. HYDROmorphone **OR** HYDROmorphone, magnesium sulfate, potassium chloride **OR** potassium alternative oral replacement **OR** potassium chloride, dextrose bolus **OR** dextrose bolus, glucagon (rDNA), dextrose, sodium chloride flush, sodium chloride, ondansetron **OR** ondansetron, polyethylene glycol, acetaminophen **OR** acetaminophen, phenol, sodium chloride, bisacodyl, oxyCODONE **OR** oxyCODONE      Assessment:  Patient Active Problem List   Diagnosis    SBO (small bowel obstruction) ([facility] [address]     OR Date 12/8/2022, exploratory laparotomy, extensive lysis of adhesions (75 minutes), small bowel and ascending colon resection with anastomosis  Diabetes      Plan:  1. Pain controlled, denies nausea or vomiting, passing flatus and stool, tolerating regular diet, incision appearance improved since partially opening--no further drainage, vitals/labs stable; continued supportive care and monitoring, local care with BID wet-to-dry dressings--may decrease to daily upon discharge  2. Regular diet as tolerated; monitor bowel function  3. Monitor and correct electrolytes  4. Antibiotics, switch to PO at discharge  5. Activity as tolerated, ambulate TID, up to chair for meals--PT/OT following  6. Pulmonary toilet, incentive spirometry  7. PRN analgesics and antiemetics--minimizing narcotics as tolerated  8. DVT prophylaxis with  Lovenox and SCD's  9. Management of medical comorbid etiologies per primary team and consulting services  10.  Disposition: Okay for discharge home from a surgical perspective; follow up with Dr. Allyn Dumas in 2 weeks    EDUCATION:  Educated patient on plan of care and disease process--all questions answered. Plans discussed with patient and nursing. Reviewed and discussed with Dr. Allyn Dumas. Signed:  Donald DunnMILLIE - CNP  12/16/2022 11:26 AM    I have personally performed a face to face diagnostic evaluation on this patient. I have interviewed and examined the patient and I agree with the assessment above. Time was spent reviewing patient chart (including but not limited to notes, labs, imaging and other testing), interviewing and counseling patient and present family members, performing physical exam, documentation of my findings and subsequent follow up of ordered medication and testing, placing referrals and communication with patient care providers, coordinating future care as well as documentation in the EHR. This encompassed more than 50% of the total encounter time. In summary, my findings and plan are the following:   Mr. Sharri Milan is a 60 y.o. male who presents with   OR Date 12/8/2022, exploratory laparotomy, extensive lysis of adhesions (75 minutes), small bowel and ascending colon resection with anastomosis  Diabetes     Plan:  1. Bowel function returned, passing stool  2. Advance diet  3. Pain well controlled  4. Increase activity as able  5. Antibiotics, monitor midline wound and continue local wound care, switch to PO antibiotics at discharge can stop at discharge  6. Defer management of remainder of medical comorbidities to primary and consulting teams  7. Discharge planning, may discharge from surgical standpoint if tolerates diet and follow in office 2 weeks    Rey Dumas MD, FACS  12/16/2022  1:37 PM

## 2022-12-16 NOTE — PROGRESS NOTES
Hospitalist Progress Note      PCP: Francois Pearl DO, DO  Semiretired meaning  Date of Admission: 12/7/2022    Chief Complaint: Abd pain      Subjective:   Abd pain controlled but exacerbated with more solid feeds. Denies n/v.  Passing flatus. No BM yet  No F/C  Being prepared for discharge in the morning      Medications:  Reviewed    Infusion Medications    lactated ringers 50 mL/hr at 12/15/22 1753    dextrose      sodium chloride      sodium chloride Stopped (12/11/22 1505)     Scheduled Medications    pantoprazole  40 mg IntraVENous Daily    insulin lispro  0-4 Units SubCUTAneous TID WC    insulin lispro  0-4 Units SubCUTAneous Nightly    sodium chloride flush  5-40 mL IntraVENous 2 times per day    enoxaparin  40 mg SubCUTAneous Daily    piperacillin-tazobactam  3,375 mg IntraVENous Q8H     PRN Meds: HYDROmorphone **OR** HYDROmorphone, magnesium sulfate, potassium chloride **OR** potassium alternative oral replacement **OR** potassium chloride, dextrose bolus **OR** dextrose bolus, glucagon (rDNA), dextrose, sodium chloride flush, sodium chloride, ondansetron **OR** ondansetron, polyethylene glycol, acetaminophen **OR** acetaminophen, phenol, sodium chloride, bisacodyl, oxyCODONE **OR** oxyCODONE      Intake/Output Summary (Last 24 hours) at 12/15/2022 2223  Last data filed at 12/15/2022 1651  Gross per 24 hour   Intake 240 ml   Output 375 ml   Net -135 ml         Exam:    /67   Pulse 88   Temp 98.6 °F (37 °C) (Temporal)   Resp 16   Ht 5' 11\" (1.803 m)   Wt 192 lb 14.4 oz (87.5 kg)   SpO2 99%   BMI 26.90 kg/m²     Gen/overall appearance: Not in acute distress. Alert. Head: Normocephalic, atraumatic  Eyes: EOMI, no scleral icterus  CVS: regular rate and rhythm, Normal S1S2  Pulm: Clear to auscultation bilaterally. No crackles/wheezes  Gastrointestinal: Soft, surgical dresssing, no guarding or rebound  Extremities: No edema.  No erythema or warmth  Neuro: No gross focal deficits noted  Skin: Warm, dry    Labs:   Recent Labs     12/13/22  0427 12/14/22  0458 12/15/22  0622   WBC 9.7 10.8 9.7   HGB 9.6* 9.1* 9.2*   HCT 29.8* 28.9* 29.1*    257 302       Recent Labs     12/13/22  0427 12/14/22  0458 12/15/22  0622    138 137   K 3.7 3.7 3.7    103 102   CO2 27 26 30   BUN 9 7 4*   CREATININE 0.7* 0.7* 0.7*   CALCIUM 7.9* 7.9* 8.7       Recent Labs     12/13/22  0427 12/14/22  0458 12/15/22  0622   AST 14* 11* 7*   ALT 18 13 12   BILITOT 0.6 0.6 0.8   ALKPHOS 66 61 61       No results for input(s): INR in the last 72 hours. No results for input(s): Normajean Logsden in the last 72 hours. Assessment/Plan:    High Grade Small Bowel Obstruction s/p  small bowel/ascending colon resection with anastomosis 12/8/22. 2.   Peritoneal adhesions due to previous procedure    -Tolerating clear liquids/diet  -Diet advanced per GS  -VF fluid hydration; decrease with diet  Trend lytes and Cr  Pain management  -cont empiric zosyn  -Post op care per GS  -bowel function improving  -monitor and replace electrolytes as needed  -analgesia prn  -Increase activity as able  -continue PT/OT  -DC Antibiotics at DC  -anticipate DC in 24-48hrs  -discharge to ARU declined. Acute blood loss anemia  Trend H&H  Transfuse for Hgb <7    Hypokalemia  Monitor and replace     Type II DM controlled  Hold oral agents  Sliding scale insulin    Hyperlipidemia  Hold statin for now    DVT Prophylaxis: lovenox  Diet: ADULT ORAL NUTRITION SUPPLEMENT; Lunch; Standard High Calorie/High Protein Oral Supplement  ADULT DIET;  Regular  Code Status: Full Code    Dispo; discharge in [address] [place] [address] MD Kiley

## 2022-12-16 NOTE — PROGRESS NOTES
[facility] [address]   Phone: [phone]    Occupational Therapy    [] Initial Evaluation            [x] Daily Treatment Note         [] Discharge Summary      Patient: Lis Hughes   : 1960   MRN: [id number]   Date of Service:  2022    Admitting Diagnosis:  SBO (small bowel obstruction) ([facility] [address]    Current Admission Summary: SBO (small bowel obstruction) ([facility] [address] Exp lap, lysis of adhesions, small  bowel and ascending colon resection with anastomosis on 22  Past Medical History:  has a past medical history of DM (diabetes mellitus) ([facility] [address] HLD (hyperlipidemia), and Tubulovillous adenoma of colon. Past Surgical History:  has a past surgical history that includes hemicolectomy and colectomy (N/A, 2022). Date of Procedure: 2022   Pre-operative Diagnosis:  Small bowel obstruction   Post-operative Diagnosis: same    Procedure:  Exploratory laparotomy, extensive lysis of adhesions (75 minutes), small bowel and ascending colon resection with anastomosis   Surgeon(s):  Wilber Del Valle MD       Discharge Recommendations:Jass Estevez scored a 20/24 on the AM-PAC ADL Inpatient form. Current research shows that an AM-PAC score of 18 or greater is typically associated with a discharge to the patient's home setting. Based on the patient's AM-PAC score, and their current ADL deficits, it is recommended that the patient have 2-3 sessions per week of Occupational Therapy at d/c to increase the patient's independence. At this time, this patient demonstrates the endurance and safety to discharge home with [address] [place] [address] (home vs OP services) and a follow up treatment frequency of 2-3x/wk. Please see assessment section for further patient specific details.     HOME HEALTH CARE: LEVEL 3 SAFETY     - Initial home health evaluation to occur within 24-48 hours, in patient home   - Therapy evaluations in home within 24-48 hours of discharge; including DME and home safety Shukri Perkins therapy 5 days, then 3x a week   - Therapy to evaluate if patient has [address] needs for personal care   -  evaluation within 24-48 hours, includes evaluation of resources and insurance to determine [place], [place], LTC, and Medicaid options    If patient discharges prior to next session this note will serve as a discharge summary. Please see below for the latest assessment towards goals. DME Required For Discharge: DME to be determined pending patient progress    Precautions/Restrictions: medium fall risk, surgical protocols, up as tolerated  Weight Bearing Restrictions: no restrictions  [] Right Upper Extremity  [] Left Upper Extremity [] Right Lower Extremity  [] Left Lower Extremity     Required Braces/Orthotics: no braces required   [] Right  [] Left  Positional Restrictions:no positional restrictions    Pre-Admission Information   Lives With: spouse                  Type of Home: condo, senior living  Home Layout: one level  Home Access: level entry  [facility]: wheelchair accessible, walk in shower  Bathroom Equipment: grab bars in shower, grab bars around toilet, built in shower seat, hand held shower head  Toilet Height: elevated height  Home Equipment: rolling walker  Transfer Assistance: Independent without use of device  Ambulation Assistance:Independent without use of device  ADL Assistance: independent with all ADL's  IADL Assistance: independent with homemaking tasks  Active :        [x] Yes                 [] No  Hand Dominance: [] Left                 [x] Right  Current Employment: full time employment. 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 Denies pain.    Pain: 0/10  Pain Interventions: not applicable     Activities of Daily Living  Basic Activities of Daily Living  Grooming: Independent stand by assistance  Grooming Comments: washed hands/oral care in stance at bathroom sink without device  Lower Extremity Dressing: minimal assistance  Dressing Comments: Liu/Doff shoes seated in room chair/EOB (to doff). Assist d/t  socks, do not anticipate assist without. Toileting: Independent. Toileting Comments: urinated in stance at toilet indep   General Comments: Pt politely declined further ADLs citing completion prior to this therapist arrival.     Instrumental Activities of Daily Living  No IADL completed on this date. Functional Mobility  Bed Mobility  Supine to Sit: modified independent  Sit to Supine: modified independent  Scooting: dependent assistance  Comments: HOB elevated, Dep to scoot to [facility], assist from [facility] bed  Transfers  Sit to stand transfer:stand by assistance  Stand to sit transfer: stand by assistance  Stand step transfer: contact guard assistance  Bed / Chair transfer: stand by assistance. Comments: CGA for ambulation     Functional Mobility:  Sitting Balance: supervision. Standing Balance: stand by assistance. Standing Balance Comment: ADL completion without device  Functional Mobility: .  contact guard assistance  Functional Mobility Activity: to/from bathroom, +270 ft in hallway  Functional Mobility Device Use: no device  Functional Mobility Comment: mild unsteadiness with intermittent lateral sway resulting in corrective side stepping, especially to R  due to has foot drop. Pt reports gait \"feels back to normal\".       Other Therapeutic Interventions    Functional Outcomes  AM-PAC Inpatient Daily Activity Raw Score: 20    Cognition  WFL  Orientation:    alert and oriented x 4  Command Following:   [facility]     Education  Barriers To Learning: none  Patient Education: patient educated on goals, OT role and benefits, plan of care, transfer training, discharge recommendations  Learning Assessment:  patient verbalizes understanding, would benefit from continued reinforcement    Assessment  Activity Tolerance: tolerated well  Impairments Requiring Therapeutic Intervention: decreased functional mobility, decreased ADL status, decreased strength, decreased safety awareness, decreased endurance, decreased balance  Prognosis: good  Clinical Assessment: Pt presenting below his functional baseline with the above deficits associated with SBO with bowel resection. Continued OT indicated in order to maximize safety and independence with all occupational pursuits. Safety Interventions: patient left in bed, bed alarm in place, call light within reach, gait belt, and nurse notified    Plan  Frequency: 3-5 x/per week  Current Treatment Recommendations: strengthening, balance training, functional mobility training, transfer training, gait training, endurance training, neuromuscular re-education, ADL/self-care training, and IADL training    Goals  Patient Goals: Return to home   Short Term Goals:  Time Frame: Discharge  Patient will complete upper body ADL at modified independent   Patient will complete lower body ADL at modified independent   Patient will complete toileting at modified independent   Patient will complete grooming at modified independent   Patient will complete functional transfers at modified independent     Continue goals 12/16    Therapy Session Time     Individual Group Co-treatment   Time In 1325     Time Out 1348     Minutes 23        Timed Code Treatment Minutes: 23 Minutes  Total Treatment Minutes:  23 minutes       Electronically Signed By: CARLY Carrasco/[id number]  I have reviewed and agree with the above treatment note.  Refugio Carrasco OTR/L [id number]

## 2022-12-16 NOTE — DISCHARGE SUMMARY
Hospital Medicine Discharge Summary    Patient: Bucky Dwyer     Gender: male  : 1960   Age: 60 y.o. MRN: [id number]    Admitting Physician: Ernestina Granado MD  Discharge Physician: Kris Irving MD     Code Status: Full Code     Admit Date: 2022   Discharge Date:   22    Disposition:  Home    Discharge Diagnoses:    High Grade Small Bowel Obstruction s/p  small bowel/ascending colon resection with anastomosis 22. 2.   Peritoneal adhesions due to previous procedure     -Tolerating clear liquids/diet  -Diet advanced per GS  -VF fluid hydration; decrease with diet  Trend lytes and Cr  Pain management  -cont empiric zosyn  -Post op care per GS  -bowel function improving  -monitor and replace electrolytes as needed  -analgesia prn  -Increase activity as able  -continue PT/OT  -DC Antibiotics at DC  -anticipate DC in 24-48hrs  -discharge to ARU declined. Acute blood loss anemia  Trend H&H  Transfuse for Hgb <7     Hypokalemia  Monitor and replace     Type II DM controlled  Hold oral agents  Sliding scale insulin     Hyperlipidemia  Hold statin for now  Follow-up appointments:  as arranged with the patient    Outpatient to do list: none    Condition at Discharge:  Stable    Hospital Course:   Mr. Uma Melgoza is a 60 y.o. male who presents with a small bowel obstruction. Recently admitted for the same and just discharged the day prior to admission through the ED yesterday. He recovered with conservative management and just prior to discharge was tolerating diet, passing stool and without pain. Shortly after returning home the same symptoms returned including pressure pain around the umbilicus and lower abdomen. Does not radiate, nothing makes it better or worse. Constant, progressively worse. NG placed last night without relief, but was malpositioned. Since has been repositioned with better output. Last bowel movement and flatus was while admitted several days ago.  Otherwise no new interim symptoms. Denies fevers, chills, chest pain, dyspnea, dysuria, other complaints. Surgical history includes a partial colectomy for a large polyp approximately 10 years ago. Several years after developed small bowel obstruction and recovered with medical management and another several years ago. Medical conditions include hypertension, hyperlipidemia, diabetes and chronic anemia. Up to date on colonoscopy   He had a high Grade Small Bowel Obstruction for which he had small bowel/ascending colon resection with anastomosis on 12/8/22. He was noted to have peritoneal adhesions due to previous procedures. He was Dcd with symptom resolution. Discharge Medications:   Current Discharge Medication List        START taking these medications    Details   amoxicillin-clavulanate (AUGMENTIN) 875-125 MG per tablet Take 1 tablet by mouth 2 times daily for 7 days  Qty: 14 tablet, Refills: 0      HYDROcodone-acetaminophen (NORCO) 5-325 MG per tablet Take 1 tablet by mouth every 6 hours as needed for Pain for up to 7 days. Take lowest dose possible to manage pain; may stop taking sooner than 7 days if pain is able to be controlled with non-narcotic analgesics and therapies.   Qty: 15 tablet, Refills: 0    Comments: Reduce doses taken as pain becomes manageable  Associated Diagnoses: S[facility] ([facility]           Current Discharge Medication List        Current Discharge Medication List        CONTINUE these medications which have NOT CHANGED    Details   ferrous sulfate (IRON 325) 325 (65 Fe) MG tablet Take 325 mg by mouth with breakfast and with evening meal      lisinopril (PRINIVIL;ZESTRIL) 40 MG tablet Take 40 mg by mouth daily      Blood Glucose Monitoring Suppl (ASSURE PRO BLOOD GLUCOSE METER) NUVIA Use as instructed      acetaminophen (TYLENOL) 325 MG tablet Take 650 mg by mouth 3 times daily Up to three tablets based on pain level      aspirin 81 MG EC tablet Take 81 mg by mouth daily atorvastatin (LIPITOR) 40 MG tablet Take 40 mg by mouth daily      cyanocobalamin 1000 MCG tablet Take 1,000 mcg by mouth daily      TRULICITY 4.37 BT/9.0VO SOPN USE 0.75 MG ONCE A WEEK FOR 30 DAYS. TRULICITY 1.5 WZ/1.3ER SC injection USE 1.5 MG ONCE A WEEK FOR 84 DAYS. JARDIANCE 10 MG tablet TAKE 1 TABLET BY MOUTH EVERY DAY      famotidine (PEPCID) 20 MG tablet Take 20 mg by mouth 2 times daily      metFORMIN (GLUCOPHAGE) 1000 MG tablet Take 1,000 mg by mouth 2 times daily (with meals)      meloxicam (MOBIC) 7.5 MG tablet Take 7.5 mg by mouth daily      SITagliptin (JANUVIA) 50 MG tablet Take 50 mg by mouth daily      sildenafil (VIAGRA) 25 MG tablet Take 25 mg by mouth as needed           Current Discharge Medication List          Discharge ROS:  A complete review of systems was asked and negative except for above     Discharge Exam:    /71   Pulse 72   Temp 99.3 °F (37.4 °C) (Temporal)   Resp 16   Ht 5' 11\" (1.803 m)   Wt 187 lb 11.2 oz (85.1 kg)   SpO2 98%   BMI 26.18 kg/m²   Gen/overall appearance: Not in acute distress. Alert. Head: Normocephalic, atraumatic  Eyes: EOMI, no scleral icterus  CVS: regular rate and rhythm, Normal S1S2  Pulm: Clear to auscultation bilaterally. No crackles/wheezes  Gastrointestinal: Soft, surgical dresssing, no guarding or rebound  Extremities: No edema. No erythema or warmth  Neuro: No gross focal deficits noted  Skin: Warm, dry    Labs:  For convenience and continuity at follow-up the following most recent labs are provided:    Lab Results   Component Value Date/Time    WBC 9.4 12/16/2022 04:37 AM    HGB 9.7 12/16/2022 04:37 AM    HCT 30.7 12/16/2022 04:37 AM    MCV 75.3 12/16/2022 04:37 AM     12/16/2022 04:37 AM     12/16/2022 04:37 AM    K 3.5 12/16/2022 04:37 AM     12/16/2022 04:37 AM    CO2 27 12/16/2022 04:37 AM    BUN 3 12/16/2022 04:37 AM    CREATININE 0.8 12/16/2022 04:37 AM    CALCIUM 8.8 12/16/2022 04:37 AM    PHOS 2.3 12/11/2022 04:29 AM    ALKPHOS 66 12/16/2022 04:37 AM    ALT 13 12/16/2022 04:37 AM    AST 11 12/16/2022 04:37 AM    BILITOT 0.4 12/16/2022 04:37 AM    LABALBU 2.6 12/16/2022 04:37 AM     Lab Results   Component Value Date    INR 1.18 (H) 12/09/2022    INR 1.06 12/02/2022    INR 0.9 12/30/2019       Radiology:  CT ABDOMEN PELVIS WO CONTRAST Additional Contrast? None    Result Date: 12/3/2022  EXAMINATION: CT OF THE ABDOMEN AND PELVIS WITHOUT CONTRAST 12/1/2022 9:37 pm TECHNIQUE: CT of the abdomen and pelvis was performed without the administration of intravenous contrast. Multiplanar reformatted images are provided for review. Automated exposure control, iterative reconstruction, and/or weight based adjustment of the mA/kV was utilized to reduce the radiation dose to as low as reasonably achievable. COMPARISON: None. HISTORY: ORDERING SYSTEM PROVIDED HISTORY: abd pain TECHNOLOGIST PROVIDED HISTORY: Reason for exam:->abd pain Additional Contrast?->None Decision Support Exception - unselect if not a suspected or confirmed emergency medical condition->Emergency Medical Condition (MA) Reason for Exam: Abd pain. Abdominal Pain (Pt reports sharp abdominal pain onset 4pm today. Reports n/v/d for a few days. Hx DM, wife states pt started trulicity approx 1 month ago and unsure if this is a side effect. States his blood glucose was 130 at home today. ). FINDINGS: Lower Chest:  Visualized portion of the lower chest demonstrates no acute abnormality. There are coronary artery calcifications. Organs: The solid organs are incompletely evaluated without intravenous contrast.  The unenhanced liver, spleen, pancreas, kidneys and adrenal glands are without acute findings. Nonobstructing right nephrolithiasis. A few simple appearing renal cysts are noted. The gallbladder is present without radiopaque cholelithiasis.  GI/Bowel: Limited evaluation of the bowel without intravenous contrast. There are multiple dilated loops of small bowel consistent with an acute small bowel obstruction. The bowel is somewhat difficult to follow. There appears to be a high-grade transition point in the midline low abdomen anterior to the iliac bifurcation. The distal small bowel is decompressed. Normal caliber of the colon. Diverticulosis without evidence of acute diverticulitis. Status post right hemicolectomy with an unremarkable appearance of the ileocolic anastomosis. Pelvis: The urinary bladder is partially distended without contour abnormality. The prostate and seminal vesicles are not well seen secondary to metallic streak artifact from a left total hip arthroplasty. Mildly enlarged bilateral inguinal lymph nodes which measure up to 1.4 cm in short axis. No definite intrapelvic adenopathy within the limitations of streak artifact. Peritoneum/Retroperitoneum: No ascites or pneumoperitoneum. Mild calcified atherosclerotic plaque. No evidence of lymphadenopathy. The aorta and IVC are normal in caliber. Bones/Soft Tissues: No acute bony or soft tissue abnormality. 1. Acute small bowel obstruction with a high grade transition point in the midline low abdomen anterior to the iliac bifurcation. 2. Status post right hemicolectomy. 3. Nonobstructing right nephrolithiasis. 4. Mildly enlarged bilateral inguinal lymph nodes, nonspecific. CT ABDOMEN PELVIS W IV CONTRAST Additional Contrast? None    Result Date: 12/7/2022  EXAMINATION: CT OF THE ABDOMEN AND PELVIS WITH CONTRAST 12/7/2022 7:35 pm TECHNIQUE: CT of the abdomen and pelvis was performed with the administration of intravenous contrast. Multiplanar reformatted images are provided for review. Automated exposure control, iterative reconstruction, and/or weight based adjustment of the mA/kV was utilized to reduce the radiation dose to as low as reasonably achievable.  COMPARISON: 12/01/2022 HISTORY: ORDERING SYSTEM PROVIDED HISTORY: abd pain h/o sbo TECHNOLOGIST PROVIDED HISTORY: Reason for exam:->abd pain h/o sbo Additional Contrast?->None Decision Support Exception - unselect if not a suspected or confirmed emergency medical condition->Emergency Medical Condition (MA) Reason for Exam: abd pain h/o sbo FINDINGS: Lower Chest: Subsegmental dependent airspace disease favored to represent atelectasis. No consolidation or spiculated lung mass. No pericardial effusion. No cardiomegaly. Coronary artery atherosclerosis. There is a small hiatal hernia. Minimal fluid in the distal thoracic esophagus suggestive of reflux. Organs: No enhancing mass identified in the liver or spleen. Gallbladder appears unremarkable. No adrenal mass. No pancreatic ductal dilation or acute pancreatic abnormality. Nonobstructive calculus noted in the right kidney. There is mild left-sided hydronephrosis without obstructive calculi. GI/Bowel: There is a high-grade small bowel obstruction noted, with a transition point seen on and around axial image 93 through 110. There is also note of a mild degree of mesenteric swirl seen around this region on coronal imaging, on and around coronal image 63. Interloop fluid. Persistent dilated loops of bowel are seen. Small volume ascites. Postoperative change involving the ascending colon. No colonic obstruction. Pelvis: No pelvic mass. The bladder is unremarkable. Pelvic ascites. Peritoneum/Retroperitoneum: No abdominal aortic aneurysm. No dissection. No retroperitoneal or mesenteric lymphadenopathy. Bones/Soft Tissues: No acute subcutaneous soft tissue abnormality. Postoperative changes in the left hip joint. Multilevel degenerative changes are noted within the spine. No osseous destructive process. Multilevel spinal canal and osseous foraminal stenosis is seen in the lumbar spine.      1. High-grade small bowel obstruction with a focal transition point seen in the mid abdomen as described above, with a mild degree of mesenteric swirling seen on coronal and sagittal imaging, which can be associated with closed loop obstruction as well as possible internal volvulus. 2. Small hiatal hernia with fluid in the distal thoracic esophagus suggestive of reflux. 3. Small volume of reactive ascites. 4. Dependent subsegmental atelectatic changes. XR CHEST PORTABLE    Result Date: 12/2/2022  EXAMINATION: ONE XRAY VIEW OF THE CHEST 12/2/2022 12:02 am COMPARISON: None. HISTORY: ORDERING SYSTEM PROVIDED HISTORY: NG placement TECHNOLOGIST PROVIDED HISTORY: Reason for exam:->NG placement Reason for Exam:  NG placement FINDINGS: There is a nasogastric tube in place with the tip just within the stomach. The gas pattern is unremarkable. Gastric tube tip probably just within the stomach. Recommend readjusting the tube tip into the distal stomach for more physiologic positioning. Nonspecific gas pattern. XR ABDOMEN FOR NG/OG/NE TUBE PLACEMENT    Result Date: 12/8/2022  EXAMINATION: ONE SUPINE XRAY VIEW(S) OF THE ABDOMEN 12/7/2022 11:52 pm COMPARISON: 12/05/2022 HISTORY: ORDERING SYSTEM PROVIDED HISTORY: Confirmation of course of NG/OG/NE tube and location of tip of tube TECHNOLOGIST PROVIDED HISTORY: Reason for exam:->Confirmation of course of NG/OG/NE tube and location of tip of tube Portable? ->Yes Reason for Exam: Confirmation of course of NG/OG/NE tube and location of tip of tube FINDINGS: The enteric catheter is coiled back upon itself in the distal thoracic esophagus, with the tip directed cephalad. Cardiac size is at the upper limits of normal.  Vascular congestion. The enteric catheter tip is coiled upon itself in the distal thoracic esophagus with the tip directed cephalad. FL SMALL BOWEL FOLLOW THROUGH ONLY    Result Date: 12/5/2022  EXAMINATION: SMALL BOWEL FOLLOW THROUGH SERIES 12/4/2022 TECHNIQUE: Small bowel follow through series was performed with overhead images and spot images. FLUOROSCOPY DOSE AND TYPE OR TIME AND EXPOSURES: 8 images were obtained.  COMPARISON: None HISTORY: ORDERING SYSTEM PROVIDED HISTORY: sbo TECHNOLOGIST PROVIDED HISTORY: Reason for exam:->sbo Reason for Exam: sbo FINDINGS:  image of the abdomen demonstrates mildly distended small bowel. Stool noted in the colon. Moderate lumbar degenerative changes identified. Left hip arthroplasty. Severe arthrosis right hip. The stomach and duodenum are normal.  Normal caliber and appearance of the jejunum. Moderately dilated ileum identified measuring up to 4.6 cm. Small bowel transit time is delayed. Contrast is noted in the colon after 4 hours. The colon is not dilated. Findings are highly suggestive of partial small bowel obstruction, with delayed small bowel transit time. XR ABDOMEN (2 VIEWS)    Result Date: 12/5/2022  EXAMINATION: TWO XRAY VIEWS OF THE ABDOMEN 12/5/2022 9:47 am COMPARISON: 12/04/2022 HISTORY: ORDERING SYSTEM PROVIDED HISTORY: SBO TECHNOLOGIST PROVIDED HISTORY: Reason for exam:->SBO Reason for Exam: SBO FINDINGS: Contrast is seen throughout the length of the colon. Air-fluid levels are seen on the upright view. Some contrast is seen within small bowel though the degree of small-bowel contrast has decreased as compared to prior. Small bowel loops are dilated. Persistent though decreased contrast within dilated small bowel as compared to prior with increased contrast in the colon; the findings can be in keeping with partial small bowel obstruction. XR ABDOMEN (2 VIEWS)    Result Date: 12/3/2022  EXAMINATION: TWO XRAY VIEWS OF THE ABDOMEN 12/3/2022 5:08 pm COMPARISON: CT abdomen and pelvis 12/01/2022. HISTORY: ORDERING SYSTEM PROVIDED HISTORY: SBO TECHNOLOGIST PROVIDED HISTORY: Reason for exam:->SBO Reason for Exam: SBO FINDINGS: The tip of the enteric tube is in the gastric fundus. The side port is at the level of the gastroesophageal junction. Air-filled distended loops of small bowel are seen in the left abdomen with air-fluid levels on the upright view.   No evidence of pneumoperitoneum. No acute osseous abnormality. 1. Air-filled distended loops of small bowel in the left abdomen with air-fluid levels on the upright view compatible with an obstruction. 2. Tip of the enteric tube in the gastric fundus and side-port at the level of the gastroesophageal junction. Consider advancement. EKG     Rhythm: normal sinus   Rate: normal  Clinical Impression: no acute changes        The patient was seen and examined on day of discharge and this discharge summary is in conjunction with any daily progress note from day of discharge. Time Spent on discharge is 30 minutes  in the examination, evaluation, counseling and review of medications and discharge plan. Note that more than 30 minutes was spent in preparing discharge papers, discussing discharge with patient, medication review, etc.       Signed:    Sanju Tucker MD   12/16/2022      Thank you Jordan Nunez DO, DO for the opportunity to be involved in this patient's care.  If you have any questions or concerns please feel free to contact me at [facility]

## 2022-12-16 NOTE — PROGRESS NOTES
Assessment completed and documented. No c/o pain/SOB. States he feels much better. Will continue to monitor.

## 2022-12-16 NOTE — DISCHARGE INSTRUCTIONS
[facility]    Discharge Instructions      Activity  - activity as tolerated, no driving while on analgesics, and no heavy lifting for 6 weeks; it is OK to be up walking around--walking up and down stairs is also OK. Do what is comfortable: stop and rest if you feel tired.     Diet  - regular diet  - Drink plenty of fluids     Pain  - You may use narcotic pain medication as prescribed for breakthrough pain  - You can use OTC Tylenol or Ibuprofen for 1-2 weeks (may need to adjust the dose as directed on the bottle if enteric coated ibuprofen chosen)  - Avoid taking narcotic pain medication on an empty stomach which may result in nausea, if pain medication is causing nausea try decreasing the dose  - Narcotic pain medication is not necessary, please contact the office if pain is not controlled  - Narcotic pain medication will not be refilled on weekends and after hours  - Please contact the office Monday-Friday 8a-4p if a refill is requested    Nausea  - Avoid taking narcotic pain medication on an empty stomach which may result in nausea  - If pain medication is causing nausea you may try decreasing the dose  - Nausea can be common for 24 hours after surgery--if nausea uncontrolled or worsening, contact the office or go to the ED    Constipation  - Pain medication can be constipating  - Often, it helps to take a stool softener with the goal of at least one soft bowel movement daily with minimal straining  - You may also need to increase water and fiber intake--may supplement with Benefiber and increasing your activity will also be helpful  - If a stool softener is needed, the following OTC medications are recommend: Colace 100 mg by mouth twice daily, Miralax 17 gm by mouth 1-3 times daily with glass of water, dulcolax suppositories, and Fleets enemas    Wound Care  - keep wound clean and dry and continue normal saline wet-to-dry dressing to 2 small open areas daily and as needed  - If incisional bleeding is noted, hold firm pressure for 15-20 minutes. If remains uncontrolled, either contact the office or present to nearest ED  - It is common to have bruising or mild redness around the wounds within the first few days after surgery. If it worsens, or starts draining, do not hesitate to contact the office  - May shower but no tub baths or swimming pools--do not submerge incisions under water    Cautions  - Watch for signs of infection, such as: fever over 100.5, excessive warmth or redness around incisions, bloody or cloudy drainage from incisions  - Watch for signs of complication: uncontrolled pain, nausea, bloating, sudden lightheadedness  - Serious problems can arise after surgery that need urgent or immediate care  - Do not hesitate to contact the office with any questions    Follow-up with your surgeon in  2 weeks. Call [phone] to schedule follow-up visit.

## 2022-12-16 NOTE — PROGRESS NOTES
Physician Progress Note      PATIENT:               Carmelina Tijerina  CSN #:                  [id number]  :                       1960  ADMIT DATE:       2022 6:09 PM  [address] DATE:  RESPONDING  PROVIDER #:        May Martinez MD          QUERY TEXT:    Patient admitted with SBO, noted to have possible internal volvulus on CT scan   on . If possible, please document in progress notes and discharge summary   if you are evaluating and/or treating any of the following: The medical record reflects the following:  Risk Factors: 59 yo with previous partial colectomy who developed adhesions    Clinical Indicators: CT , High-grade small bowel obstruction with a focal   transition point seen in the mid abdomen as described above, with a mild   degree of mesenteric swirling seen on coronal and sagittal imaging, which can   be associated with closed loop obstruction as well as possible internal   volvulus. Op note, With that careful tedious dissection, usually mostly blunt and sharp   dissection, but a  careful amount of cautery, the small bowel was dissected off the anterior   abdominal wall and the entirety of the fascia was opened. On further   exploration of the abdomen, there was an intense amount of adhesions to other   loops as well as to the posterior retroperitoneum that took approximately an   hour and fifteen minutes to completely lyse. ED,  He is nauseous has not had any vomiting. pain is throughout the lower   abdomen, is rated as a 10/10, is constant    Treatment: Lysis of adhesions, small bowel and ascending colon resection  Options provided:  -- Possible internal volvulus  -- SBO only, no volvulus  -- Other - I will add my own diagnosis  -- Disagree - Not applicable / Not valid  -- Disagree - Clinically unable to determine / Unknown  -- Refer to Clinical Documentation Reviewer    PROVIDER RESPONSE TEXT:    SBO only no volvulus. Query created by:  Mary Villalta on 2022 8: 40 AM      Electronically signed by:  Rafa Deutsch MD 12/16/2022 10:03 AM

## 2022-12-16 NOTE — PROGRESS NOTES
PM assessment complete and documented. Meds given per MAR. Abd dressing changed. Cleaned with saline and wet to dry dressing gently packed in opened incision areas. Abd pad with soft cloth tape applied. Pt denies pain at present. No other needs expressed. Will continue to monitor.

## 2022-12-16 NOTE — PROGRESS NOTES
Pt sleeping between care. No changes noted in assessment. Abd dressing secure with no drainage noted at present. Will continue to monitor.

## 2022-12-30 ENCOUNTER — OFFICE VISIT (OUTPATIENT)
Dept: SURGERY | Age: 62
End: 2022-12-30

## 2022-12-30 VITALS — DIASTOLIC BLOOD PRESSURE: 68 MMHG | SYSTOLIC BLOOD PRESSURE: 100 MMHG | WEIGHT: 166 LBS | BODY MASS INDEX: 23.15 KG/M2

## 2022-12-30 DIAGNOSIS — Z09 SURGERY FOLLOW-UP: Primary | ICD-10-CM

## 2022-12-30 PROCEDURE — 99024 POSTOP FOLLOW-UP VISIT: CPT | Performed by: SURGERY

## 2022-12-30 NOTE — PROGRESS NOTES
[facility]  SUBJECTIVE:    Aaron Reyna   1960   60 y.o. male presents for routine postoperative followup after OR Date 12/8/2022, exploratory laparotomy, extensive lysis of adhesions (75 minutes), small bowel and ascending colon resection with anastomosis. Bowels still loose but slowly improving. Pain controlled. Ambulation improving. Tolerated diet.   Midline wound also improving    Past Medical History:   Diagnosis Date    DM (diabetes mellitus) ([address]     HLD (hyperlipidemia)     Tubulovillous adenoma of colon      Past Surgical History:   Procedure Laterality Date    COLECTOMY N/A 12/8/2022    EXPLORATORY LAPAROTOMY LYSIS OF ADHESIONS SMALL BOWEL RESECTION performed by Sherie Hall MD at [facility] History     Socioeconomic History    Marital status:      Spouse name: Lacie Zaragoza    Number of children: 3    Years of education: 14    Highest education level: Not on file   Occupational History    Not on file   Tobacco Use    Smoking status: Never    Smokeless tobacco: Never   Vaping Use    Vaping Use: Never used   Substance and Sexual Activity    Alcohol use: Yes     Comment: occasional beer    Drug use: Never    Sexual activity: Yes     Partners: Female   Other Topics Concern    Not on file   Social History Narrative    Not on file     Social Determinants of Health     Financial Resource Strain: Not on file   Food Insecurity: Not on file   Transportation Needs: Not on file   Physical Activity: Not on file   Stress: Not on file   Social Connections: Not on file   Intimate Partner Violence: Not on file   Housing Stability: Not on file      Family History   Problem Relation Age of Onset    Diabetes Mother     Cancer Mother     Other Father         dementia     Current Outpatient Medications   Medication Sig Dispense Refill    ferrous sulfate (IRON 325) 325 (65 Fe) MG tablet Take 325 mg by mouth with breakfast and with evening meal lisinopril (PRINIVIL;ZESTRIL) 40 MG tablet Take 40 mg by mouth daily      Blood Glucose Monitoring Suppl (ASSURE PRO BLOOD GLUCOSE METER) NUVIA Use as instructed      acetaminophen (TYLENOL) 325 MG tablet Take 650 mg by mouth 3 times daily Up to three tablets based on pain level      aspirin 81 MG EC tablet Take 81 mg by mouth daily      atorvastatin (LIPITOR) 40 MG tablet Take 40 mg by mouth daily      cyanocobalamin 1000 MCG tablet Take 1,000 mcg by mouth daily      TRULICITY 1.5 UK/3.6TD SC injection USE 1.5 MG ONCE A WEEK FOR 84 DAYS. JARDIANCE 10 MG tablet TAKE 1 TABLET BY MOUTH EVERY DAY      famotidine (PEPCID) 20 MG tablet Take 20 mg by mouth 2 times daily      metFORMIN (GLUCOPHAGE) 1000 MG tablet Take 1,000 mg by mouth 2 times daily (with meals)      meloxicam (MOBIC) 7.5 MG tablet Take 7.5 mg by mouth daily      SITagliptin (JANUVIA) 50 MG tablet Take 50 mg by mouth daily      sildenafil (VIAGRA) 25 MG tablet Take 25 mg by mouth as needed      TRULICITY 3.81 ZU/9.5HJ SOPN USE 0.75 MG ONCE A WEEK FOR 30 DAYS. No current facility-administered medications for this visit. No Known Allergies     Review of Systems:  Review of systems performed and negative with the exception of the above findings    OBJECTIVE:  /68   Wt 166 lb (75.3 kg)   BMI 23.15 kg/m²      Physical Exam:  General appearance: alert, appears stated age, cooperative, and no distress  Abdomen: soft, non-distended, appropriate incisional tenderness, incision clean dry and intact except for areas opened during hospital, minimal drainage and without cellulitis, remainder of staples removed    No results displayed because visit has over 200 results. No results displayed because visit has over 200 results.           CT ABDOMEN PELVIS WO CONTRAST Additional Contrast? None    Result Date: 12/3/2022  EXAMINATION: CT OF THE ABDOMEN AND PELVIS WITHOUT CONTRAST 12/1/2022 9:37 pm TECHNIQUE: CT of the abdomen and pelvis was performed without the administration of intravenous contrast. Multiplanar reformatted images are provided for review. Automated exposure control, iterative reconstruction, and/or weight based adjustment of the mA/kV was utilized to reduce the radiation dose to as low as reasonably achievable. COMPARISON: None. HISTORY: ORDERING SYSTEM PROVIDED HISTORY: abd pain TECHNOLOGIST PROVIDED HISTORY: Reason for exam:->abd pain Additional Contrast?->None Decision Support Exception - unselect if not a suspected or confirmed emergency medical condition->Emergency Medical Condition (MA) Reason for Exam: Abd pain. Abdominal Pain (Pt reports sharp abdominal pain onset 4pm today. Reports n/v/d for a few days. Hx DM, wife states pt started trulicity approx 1 month ago and unsure if this is a side effect. States his blood glucose was 130 at home today. ). FINDINGS: Lower Chest:  Visualized portion of the lower chest demonstrates no acute abnormality. There are coronary artery calcifications. Organs: The solid organs are incompletely evaluated without intravenous contrast.  The unenhanced liver, spleen, pancreas, kidneys and adrenal glands are without acute findings. Nonobstructing right nephrolithiasis. A few simple appearing renal cysts are noted. The gallbladder is present without radiopaque cholelithiasis. GI/Bowel: Limited evaluation of the bowel without intravenous contrast. There are multiple dilated loops of small bowel consistent with an acute small bowel obstruction. The bowel is somewhat difficult to follow. There appears to be a high-grade transition point in the midline low abdomen anterior to the iliac bifurcation. The distal small bowel is decompressed. Normal caliber of the colon. Diverticulosis without evidence of acute diverticulitis. Status post right hemicolectomy with an unremarkable appearance of the ileocolic anastomosis. Pelvis: The urinary bladder is partially distended without contour abnormality. suggestive of reflux. Organs: No enhancing mass identified in the liver or spleen. Gallbladder appears unremarkable. No adrenal mass. No pancreatic ductal dilation or acute pancreatic abnormality. Nonobstructive calculus noted in the right kidney. There is mild left-sided hydronephrosis without obstructive calculi. GI/Bowel: There is a high-grade small bowel obstruction noted, with a transition point seen on and around axial image 93 through 110. There is also note of a mild degree of mesenteric swirl seen around this region on coronal imaging, on and around coronal image 63. Interloop fluid. Persistent dilated loops of bowel are seen. Small volume ascites. Postoperative change involving the ascending colon. No colonic obstruction. Pelvis: No pelvic mass. The bladder is unremarkable. Pelvic ascites. Peritoneum/Retroperitoneum: No abdominal aortic aneurysm. No dissection. No retroperitoneal or mesenteric lymphadenopathy. Bones/Soft Tissues: No acute subcutaneous soft tissue abnormality. Postoperative changes in the left hip joint. Multilevel degenerative changes are noted within the spine. No osseous destructive process. Multilevel spinal canal and osseous foraminal stenosis is seen in the lumbar spine. 1. High-grade small bowel obstruction with a focal transition point seen in the mid abdomen as described above, with a mild degree of mesenteric swirling seen on coronal and sagittal imaging, which can be associated with closed loop obstruction as well as possible internal volvulus. 2. Small hiatal hernia with fluid in the distal thoracic esophagus suggestive of reflux. 3. Small volume of reactive ascites. 4. Dependent subsegmental atelectatic changes. XR CHEST PORTABLE    Result Date: 12/2/2022  EXAMINATION: ONE XRAY VIEW OF THE CHEST 12/2/2022 12:02 am COMPARISON: None.  HISTORY: ORDERING SYSTEM PROVIDED HISTORY: NG placement TECHNOLOGIST PROVIDED HISTORY: Reason for exam:->NG placement Reason for Exam:  NG placement FINDINGS: There is a nasogastric tube in place with the tip just within the stomach. The gas pattern is unremarkable. Gastric tube tip probably just within the stomach. Recommend readjusting the tube tip into the distal stomach for more physiologic positioning. Nonspecific gas pattern. XR ABDOMEN FOR NG/OG/NE TUBE PLACEMENT    Result Date: 12/8/2022  EXAMINATION: ONE SUPINE XRAY VIEW(S) OF THE ABDOMEN 12/7/2022 11:52 pm COMPARISON: 12/05/2022 HISTORY: ORDERING SYSTEM PROVIDED HISTORY: Confirmation of course of NG/OG/NE tube and location of tip of tube TECHNOLOGIST PROVIDED HISTORY: Reason for exam:->Confirmation of course of NG/OG/NE tube and location of tip of tube Portable? ->Yes Reason for Exam: Confirmation of course of NG/OG/NE tube and location of tip of tube FINDINGS: The enteric catheter is coiled back upon itself in the distal thoracic esophagus, with the tip directed cephalad. Cardiac size is at the upper limits of normal.  Vascular congestion. The enteric catheter tip is coiled upon itself in the distal thoracic esophagus with the tip directed cephalad. FL SMALL BOWEL FOLLOW THROUGH ONLY    Result Date: 12/5/2022  EXAMINATION: SMALL BOWEL FOLLOW THROUGH SERIES 12/4/2022 TECHNIQUE: Small bowel follow through series was performed with overhead images and spot images. FLUOROSCOPY DOSE AND TYPE OR TIME AND EXPOSURES: 8 images were obtained. COMPARISON: None HISTORY: ORDERING SYSTEM PROVIDED HISTORY: sbo TECHNOLOGIST PROVIDED HISTORY: Reason for exam:->sbo Reason for Exam: sbo FINDINGS:  image of the abdomen demonstrates mildly distended small bowel. Stool noted in the colon. Moderate lumbar degenerative changes identified. Left hip arthroplasty. Severe arthrosis right hip. The stomach and duodenum are normal.  Normal caliber and appearance of the jejunum. Moderately dilated ileum identified measuring up to 4.6 cm. Small bowel transit time is delayed. Contrast is noted in the colon after 4 hours. The colon is not dilated. Findings are highly suggestive of partial small bowel obstruction, with delayed small bowel transit time. XR ABDOMEN (2 VIEWS)    Result Date: 12/5/2022  EXAMINATION: TWO XRAY VIEWS OF THE ABDOMEN 12/5/2022 9:47 am COMPARISON: 12/04/2022 HISTORY: ORDERING SYSTEM PROVIDED HISTORY: SBO TECHNOLOGIST PROVIDED HISTORY: Reason for exam:->SBO Reason for Exam: SBO FINDINGS: Contrast is seen throughout the length of the colon. Air-fluid levels are seen on the upright view. Some contrast is seen within small bowel though the degree of small-bowel contrast has decreased as compared to prior. Small bowel loops are dilated. Persistent though decreased contrast within dilated small bowel as compared to prior with increased contrast in the colon; the findings can be in keeping with partial small bowel obstruction. XR ABDOMEN (2 VIEWS)    Result Date: 12/3/2022  EXAMINATION: TWO XRAY VIEWS OF THE ABDOMEN 12/3/2022 5:08 pm COMPARISON: CT abdomen and pelvis 12/01/2022. HISTORY: ORDERING SYSTEM PROVIDED HISTORY: SBO TECHNOLOGIST PROVIDED HISTORY: Reason for exam:->SBO Reason for Exam: SBO FINDINGS: The tip of the enteric tube is in the gastric fundus. The side port is at the level of the gastroesophageal junction. Air-filled distended loops of small bowel are seen in the left abdomen with air-fluid levels on the upright view. No evidence of pneumoperitoneum. No acute osseous abnormality. 1. Air-filled distended loops of small bowel in the left abdomen with air-fluid levels on the upright view compatible with an obstruction. 2. Tip of the enteric tube in the gastric fundus and side-port at the level of the gastroesophageal junction. Consider advancement.      Assessment:  OR Date 12/8/2022, exploratory laparotomy, extensive lysis of adhesions (75 minutes), small bowel and ascending colon resection with anastomosis    Plan:  Continue local wound care with dry dressings to midline as needed  Shower and bathe normally, avoid open water such as pools and hot tubs until wound completely closed  Does not need additional antibiotics  Monitor diarrhea, if does not improve will need to reach back out to office as may need further work-up to rule out etiologies such as C. difficile colitis  No heavy lifting over 20lbs for 6 weeks total postop  Diet and activity as tolerated otherwise  Follow up with general surgery office as needed    [facility] DEDRICK Romano MD, FACS  12/30/2022  12:22 PM

## 2022-12-30 NOTE — PATIENT INSTRUCTIONS
Continue local wound care with dry dressings to midline as needed  Shower and bathe normally, avoid open water such as pools and hot tubs until wound completely closed  Does not need additional antibiotics  Monitor diarrhea, if does not improve will need to reach back out to office as may need further work-up to rule out etiologies such as C. difficile colitis  No heavy lifting over 20lbs for 6 weeks total postop  Diet and activity as tolerated otherwise  Follow up with general surgery office as needed

## 2023-01-09 ENCOUNTER — TELEPHONE (OUTPATIENT)
Dept: SURGERY | Age: 63
End: 2023-01-09

## 2023-01-09 NOTE — TELEPHONE ENCOUNTER
Note given to pt to RTW 1/10/23 with restrictions, see chart Patient requests all Lab and Radiology Results on their Discharge Instructions

## 2024-12-07 ENCOUNTER — APPOINTMENT (OUTPATIENT)
Dept: CT IMAGING | Age: 64
End: 2024-12-07
Payer: COMMERCIAL

## 2024-12-07 ENCOUNTER — HOSPITAL ENCOUNTER (EMERGENCY)
Age: 64
Discharge: HOME | End: 2024-12-07
Attending: STUDENT IN AN ORGANIZED HEALTH CARE EDUCATION/TRAINING PROGRAM
Payer: COMMERCIAL

## 2024-12-07 ENCOUNTER — APPOINTMENT (OUTPATIENT)
Dept: GENERAL RADIOLOGY | Age: 64
End: 2024-12-07
Payer: COMMERCIAL

## 2024-12-07 VITALS
TEMPERATURE: 98.2 F | RESPIRATION RATE: 16 BRPM | WEIGHT: 170 LBS | DIASTOLIC BLOOD PRESSURE: 72 MMHG | HEIGHT: 70 IN | BODY MASS INDEX: 24.34 KG/M2 | OXYGEN SATURATION: 98 % | SYSTOLIC BLOOD PRESSURE: 113 MMHG | HEART RATE: 78 BPM

## 2024-12-07 DIAGNOSIS — R42 VERTIGO: ICD-10-CM

## 2024-12-07 DIAGNOSIS — R79.89 ABNORMAL CBC: ICD-10-CM

## 2024-12-07 DIAGNOSIS — R42 DIZZINESS: Primary | ICD-10-CM

## 2024-12-07 LAB
ALBUMIN SERPL-MCNC: 4.2 G/DL (ref 3.4–5)
ALBUMIN/GLOB SERPL: 1.4 {RATIO} (ref 1.1–2.2)
ALP SERPL-CCNC: 115 U/L (ref 40–129)
ALT SERPL-CCNC: 31 U/L (ref 10–40)
ANION GAP SERPL CALCULATED.3IONS-SCNC: 11 MMOL/L (ref 3–16)
AST SERPL-CCNC: 33 U/L (ref 15–37)
BILIRUB SERPL-MCNC: 0.5 MG/DL (ref 0–1)
BILIRUB UR QL STRIP.AUTO: NEGATIVE
BUN SERPL-MCNC: 12 MG/DL (ref 7–20)
CALCIUM SERPL-MCNC: 9.5 MG/DL (ref 8.3–10.6)
CHLORIDE SERPL-SCNC: 102 MMOL/L (ref 99–110)
CLARITY UR: CLEAR
CO2 SERPL-SCNC: 28 MMOL/L (ref 21–32)
COLOR UR: YELLOW
CREAT SERPL-MCNC: 0.8 MG/DL (ref 0.8–1.3)
GFR SERPLBLD CREATININE-BSD FMLA CKD-EPI: >90 ML/MIN/{1.73_M2}
GLUCOSE SERPL-MCNC: 125 MG/DL (ref 70–99)
GLUCOSE UR STRIP.AUTO-MCNC: >=1000 MG/DL
HGB UR QL STRIP.AUTO: NEGATIVE
KETONES UR STRIP.AUTO-MCNC: NEGATIVE MG/DL
LEUKOCYTE ESTERASE UR QL STRIP.AUTO: NEGATIVE
NITRITE UR QL STRIP.AUTO: NEGATIVE
NT-PROBNP SERPL-MCNC: <36 PG/ML (ref 0–124)
PH UR STRIP.AUTO: 6.5 [PH] (ref 5–8)
POTASSIUM SERPL-SCNC: 3.7 MMOL/L (ref 3.5–5.1)
PROT SERPL-MCNC: 7.1 G/DL (ref 6.4–8.2)
PROT UR STRIP.AUTO-MCNC: NEGATIVE MG/DL
SODIUM SERPL-SCNC: 141 MMOL/L (ref 136–145)
SP GR UR STRIP.AUTO: >=1.03 (ref 1–1.03)
TROPONIN, HIGH SENSITIVITY: 21 NG/L (ref 0–22)
UA COMPLETE W REFLEX CULTURE PNL UR: ABNORMAL
UA DIPSTICK W REFLEX MICRO PNL UR: ABNORMAL
URN SPEC COLLECT METH UR: ABNORMAL
UROBILINOGEN UR STRIP-ACNC: 0.2 E.U./DL

## 2024-12-07 PROCEDURE — 85025 COMPLETE CBC W/AUTO DIFF WBC: CPT

## 2024-12-07 PROCEDURE — 99285 EMERGENCY DEPT VISIT HI MDM: CPT

## 2024-12-07 PROCEDURE — 6360000004 HC RX CONTRAST MEDICATION: Performed by: PHYSICIAN ASSISTANT

## 2024-12-07 PROCEDURE — 71045 X-RAY EXAM CHEST 1 VIEW: CPT

## 2024-12-07 PROCEDURE — 80053 COMPREHEN METABOLIC PANEL: CPT

## 2024-12-07 PROCEDURE — 6370000000 HC RX 637 (ALT 250 FOR IP): Performed by: PHYSICIAN ASSISTANT

## 2024-12-07 PROCEDURE — 81003 URINALYSIS AUTO W/O SCOPE: CPT

## 2024-12-07 PROCEDURE — 93005 ELECTROCARDIOGRAM TRACING: CPT | Performed by: STUDENT IN AN ORGANIZED HEALTH CARE EDUCATION/TRAINING PROGRAM

## 2024-12-07 PROCEDURE — 84484 ASSAY OF TROPONIN QUANT: CPT

## 2024-12-07 PROCEDURE — 70450 CT HEAD/BRAIN W/O DYE: CPT

## 2024-12-07 PROCEDURE — 83880 ASSAY OF NATRIURETIC PEPTIDE: CPT

## 2024-12-07 PROCEDURE — 70498 CT ANGIOGRAPHY NECK: CPT

## 2024-12-07 RX ORDER — MECLIZINE HCL 12.5 MG 12.5 MG/1
25 TABLET ORAL ONCE
Status: COMPLETED | OUTPATIENT
Start: 2024-12-07 | End: 2024-12-07

## 2024-12-07 RX ORDER — MECLIZINE HYDROCHLORIDE 25 MG/1
25 TABLET ORAL 3 TIMES DAILY PRN
Qty: 15 TABLET | Refills: 0 | Status: SHIPPED | OUTPATIENT
Start: 2024-12-07 | End: 2024-12-17

## 2024-12-07 RX ORDER — IOPAMIDOL 755 MG/ML
75 INJECTION, SOLUTION INTRAVASCULAR
Status: COMPLETED | OUTPATIENT
Start: 2024-12-07 | End: 2024-12-07

## 2024-12-07 RX ADMIN — IOPAMIDOL 75 ML: 755 INJECTION, SOLUTION INTRAVENOUS at 21:33

## 2024-12-07 RX ADMIN — MECLIZINE 25 MG: 12.5 TABLET ORAL at 20:28

## 2024-12-07 ASSESSMENT — LIFESTYLE VARIABLES
HOW OFTEN DO YOU HAVE A DRINK CONTAINING ALCOHOL: NEVER
HOW MANY STANDARD DRINKS CONTAINING ALCOHOL DO YOU HAVE ON A TYPICAL DAY: PATIENT DOES NOT DRINK

## 2024-12-07 ASSESSMENT — ENCOUNTER SYMPTOMS
COLOR CHANGE: 0
BACK PAIN: 0
STRIDOR: 0
ABDOMINAL PAIN: 0
COUGH: 0
NAUSEA: 1
SHORTNESS OF BREATH: 0
VOMITING: 0
DIARRHEA: 0
CONSTIPATION: 0
WHEEZING: 0

## 2024-12-07 ASSESSMENT — PAIN - FUNCTIONAL ASSESSMENT: PAIN_FUNCTIONAL_ASSESSMENT: NONE - DENIES PAIN

## 2024-12-08 LAB
ACANTHOCYTES BLD QL SMEAR: ABNORMAL
BASOPHILS # BLD: 0 K/UL (ref 0–0.2)
BASOPHILS NFR BLD: 0 %
DEPRECATED RDW RBC AUTO: 16 % (ref 12.4–15.4)
EKG ATRIAL RATE: 76 BPM
EKG DIAGNOSIS: NORMAL
EKG P AXIS: 60 DEGREES
EKG P-R INTERVAL: 162 MS
EKG Q-T INTERVAL: 380 MS
EKG QRS DURATION: 88 MS
EKG QTC CALCULATION (BAZETT): 427 MS
EKG R AXIS: 84 DEGREES
EKG T AXIS: 51 DEGREES
EKG VENTRICULAR RATE: 76 BPM
EOSINOPHIL # BLD: 0.1 K/UL (ref 0–0.6)
EOSINOPHIL NFR BLD: 1 %
HCT VFR BLD AUTO: 39 % (ref 40.5–52.5)
HGB BLD-MCNC: 12.9 G/DL (ref 13.5–17.5)
LYMPHOCYTES # BLD: 6.8 K/UL (ref 1–5.1)
LYMPHOCYTES NFR BLD: 50 %
MCH RBC QN AUTO: 26.1 PG (ref 26–34)
MCHC RBC AUTO-ENTMCNC: 33 G/DL (ref 31–36)
MCV RBC AUTO: 79 FL (ref 80–100)
MONOCYTES # BLD: 0.1 K/UL (ref 0–1.3)
MONOCYTES NFR BLD: 1 %
NEUTROPHILS # BLD: 2.9 K/UL (ref 1.7–7.7)
NEUTROPHILS NFR BLD: 29 %
PATH INTERP BLD-IMP: YES
PLATELET # BLD AUTO: 181 K/UL (ref 135–450)
PLATELET BLD QL SMEAR: ADEQUATE
PMV BLD AUTO: 8.1 FL (ref 5–10.5)
POIKILOCYTOSIS BLD QL SMEAR: ABNORMAL
RBC # BLD AUTO: 4.93 M/UL (ref 4.2–5.9)
SCHISTOCYTES BLD QL SMEAR: ABNORMAL
SLIDE REVIEW: ABNORMAL
VARIANT LYMPHS NFR BLD MANUAL: 19 % (ref 0–6)
WBC # BLD AUTO: 9.9 K/UL (ref 4–11)

## 2024-12-08 PROCEDURE — 93010 ELECTROCARDIOGRAM REPORT: CPT | Performed by: INTERNAL MEDICINE

## 2024-12-08 NOTE — ED PROVIDER NOTES
In addition to the advanced practice provider, I personally saw Jass Kraus and performed a substantive portion of the visit including all aspects of the medical decision making.    Medical Decision Making  Patient endorsing 3 days of dizziness worse with any head movement.  Feels better when he lays still.  Feels like a room spinning sensation.  It is worse when he turns his head to get out of the car.  No vision change.  History of hypertension for which he is compliant with antihypertensive medication.      Mentation normal. Answering questions and following commands appropriately  Truncal ataxia is not present.  Test of skew is negative bilaterally.  NIH is 0 as below.  CTA head and neck is reassuring no significant cerebral arterial stenosis or LVO.  Head CT without any ICH or mass occupying lesion.  We discussed high likelihood that this is peripheral vertigo rather than central vertigo.  He understands that he would not be a TNK or thrombectomy candidate due to extended duration of symptoms.  He does have improvement in his symptoms here with meclizine and prefers home-going today with prescription for meclizine and PCP follow-up which she does have on Tuesday.  I did discuss incidental finding of occasional schistocytes on his peripheral smear today, this appears to be a new finding from 2022.  He has no clinically significant leukocytosis or anemia to explain his symptoms.  Would benefit from repeat CBC as an outpatient to ensure resolution of this abnormal finding.  Strict return precautions given.  He is comfortable with plan.    EKG  The Ekg interpreted by me in the absence of a cardiologist shows.  normal sinus rhythm with a rate of 76  Axis is   Normal  QTc is  normal  Intervals and Durations are unremarkable.      No specific ST-T wave changes appreciated.  No evidence of acute ischemia.   No prior    SEP-1  Is this patient to be included in the SEP-1 Core Measure due to severe sepsis or septic 
is resolved.  Patient is able to ambulate with steady gait.  NIH scale remains 0      DIAGNOSTIC RESULTS   LABS:    Labs Reviewed   CBC WITH AUTO DIFFERENTIAL - Abnormal; Notable for the following components:       Result Value    Hemoglobin 12.9 (*)     Hematocrit 39.0 (*)     MCV 79.0 (*)     RDW 16.0 (*)     Path Consult Flagged (*)     Lymphocytes Absolute 6.8 (*)     Atypical Lymphocytes Relative 19 (*)     Poikilocytes Occasional (*)     Schistocytes Occasional (*)     Acanthocytes Occasional (*)     All other components within normal limits   COMPREHENSIVE METABOLIC PANEL - Abnormal; Notable for the following components:    Glucose 125 (*)     All other components within normal limits   URINALYSIS WITH REFLEX TO CULTURE - Abnormal; Notable for the following components:    Glucose, Ur >=1000 (*)     All other components within normal limits   TROPONIN   BRAIN NATRIURETIC PEPTIDE       When ordered only abnormal lab results are displayed. All other labs were within normal range or not returned as of this dictation.    EKG: When ordered, EKG's are interpreted by the Emergency Department Physician in the absence of a cardiologist.  Please see their note for interpretation of EKG.    RADIOLOGY:   Non-plain film images such as CT, Ultrasound and MRI are read by the radiologist. Plain radiographic images are visualized and preliminarily interpreted by the ED Provider with the below findings:        Interpretation per the Radiologist below, if available at the time of this note:    CT HEAD WO CONTRAST   Final Result   No acute intracranial abnormality.         CTA HEAD NECK W CONTRAST   Final Result   No acute arterial abnormality or hemodynamically significant stenosis in the   head or neck.         XR CHEST PORTABLE   Final Result   No acute process.               PROCEDURES   Unless otherwise noted below, none     Procedures    CRITICAL CARE TIME (.cctime)   N/a    PAST MEDICAL HISTORY         Chronic Conditions

## 2024-12-09 LAB — PATH INTERP BLD-IMP: NORMAL

## (undated) DEVICE — TOTAL TRAY, DB, 100% SILI FOLEY, 16FR 10: Brand: MEDLINE

## (undated) DEVICE — GAUZE,PACKING STRIP,IODOFORM,1"X5YD,STRL: Brand: CURAD

## (undated) DEVICE — SPONGE LAP W18XL18IN WHT COT 4 PLY FLD STRUNG RADPQ DISP ST

## (undated) DEVICE — DRAPE WARMER SLUSH 66X44IN

## (undated) DEVICE — SUTURE PERMAHAND SZ 2-0 L30IN NONABSORBABLE BLK SILK W/O A305H

## (undated) DEVICE — SHEET,DRAPE,53X77,STERILE: Brand: MEDLINE

## (undated) DEVICE — SUTURE VCRL + SZ 0 L27IN ABSRB UD L36MM CT-1 1/2 CIR VCPP41D

## (undated) DEVICE — MAJOR SET UP PK

## (undated) DEVICE — SOLUTION IRRIG 1000ML 0.9% SOD CHL USP POUR PLAS BTL

## (undated) DEVICE — PAD,ABDOMINAL,8"X10",ST,LF: Brand: MEDLINE

## (undated) DEVICE — GOWN,AURORA,NONREINF,RAGLAN,XXL,STERILE: Brand: MEDLINE

## (undated) DEVICE — DRAPE,LAP,CHOLE,W/TROUGHS,STERILE: Brand: MEDLINE

## (undated) DEVICE — BLADE ES L6IN ELASTOMERIC COAT INSUL DURABLE BEND UPTO

## (undated) DEVICE — DRAPE,REIN 53X77,STERILE: Brand: MEDLINE

## (undated) DEVICE — RELOAD STPL L60MM H1.5-3.6MM REG TISS BLU GRIPPING SURF B

## (undated) DEVICE — STERILE POLYISOPRENE POWDER-FREE SURGICAL GLOVES: Brand: PROTEXIS

## (undated) DEVICE — LIGHT HANDLE: Brand: DEVON

## (undated) DEVICE — GAUZE,SPONGE,4"X4",8PLY,STRL,LF,10/TRAY: Brand: MEDLINE

## (undated) DEVICE — MERCY FAIRFIELD TURNOVER KIT: Brand: MEDLINE INDUSTRIES, INC.

## (undated) DEVICE — SEALER ENDOSCP NANO COAT OPN DIV CRV L JAW LIGASURE IMPACT

## (undated) DEVICE — BLANKET WRM W29.9XL79.1IN UP BODY FORC AIR MISTRAL-AIR

## (undated) DEVICE — SUTURE PDS + SZ 1 L96IN ABSRB VLT L65MM TP-1 1/2 CIR PDP880G

## (undated) DEVICE — APPLICATOR MEDICATED 26 CC SOLUTION HI LT ORNG CHLORAPREP

## (undated) DEVICE — STAPLER INT L60MM REG TISS BLU B FRM 8 FIRING 2 ROW AUTO

## (undated) DEVICE — BLADE CLIPPER GEN PURP NS

## (undated) DEVICE — SUTURE VCRL + SZ 3-0 L18IN ABSRB UD SH 1/2 CIR TAPERCUT NDL VCP864D

## (undated) DEVICE — STAPLER INT L34CM 60MM LNG ENDOSCP ARTC PWR + ECHELON FLX

## (undated) DEVICE — PENCIL SMK EVAC TELSCP 3 M TBNG

## (undated) DEVICE — STAPLER SKIN LEG L3.9MM DIA0.53MM WIDE ROT HD FOR WND CLSR

## (undated) DEVICE — ELECTRODE PT RET AD L9FT HI MOIST COND ADH HYDRGEL CORDED

## (undated) DEVICE — SUTURE VCRL + SZ 0 L18IN ABSRB CLR VCP112G

## (undated) DEVICE — SUTURE PERMAHAND SZ 3-0 L18IN NONABSORBABLE BLK L26MM SH C013D